# Patient Record
Sex: FEMALE | Race: WHITE | NOT HISPANIC OR LATINO | Employment: FULL TIME | URBAN - METROPOLITAN AREA
[De-identification: names, ages, dates, MRNs, and addresses within clinical notes are randomized per-mention and may not be internally consistent; named-entity substitution may affect disease eponyms.]

---

## 2012-01-05 LAB — EXTERNAL HIV SCREEN: NORMAL

## 2017-04-30 ENCOUNTER — OFFICE VISIT (OUTPATIENT)
Dept: URGENT CARE | Facility: CLINIC | Age: 47
End: 2017-04-30
Payer: COMMERCIAL

## 2017-04-30 PROCEDURE — 99201 HB OFFICE/OUTPATIENT VISIT NEW (BRIEF): CPT

## 2017-07-22 ENCOUNTER — OFFICE VISIT (OUTPATIENT)
Dept: URGENT CARE | Facility: CLINIC | Age: 47
End: 2017-07-22
Payer: COMMERCIAL

## 2017-07-22 PROCEDURE — 99213 OFFICE O/P EST LOW 20 MIN: CPT

## 2017-09-17 ENCOUNTER — OFFICE VISIT (OUTPATIENT)
Dept: URGENT CARE | Facility: CLINIC | Age: 47
End: 2017-09-17
Payer: COMMERCIAL

## 2017-09-17 PROCEDURE — 99213 OFFICE O/P EST LOW 20 MIN: CPT

## 2017-11-26 ENCOUNTER — OFFICE VISIT (OUTPATIENT)
Dept: URGENT CARE | Facility: CLINIC | Age: 47
End: 2017-11-26
Payer: COMMERCIAL

## 2017-11-26 PROCEDURE — 99213 OFFICE O/P EST LOW 20 MIN: CPT

## 2018-01-18 NOTE — PROGRESS NOTES
Assessment   1  History of subconjunctival hemorrhage (V12 49) (Z86 69)  2  Non-traumatic subconjunctival hemorrhage of left eye (372 72) (H11 32)    Plan  Non-traumatic subconjunctival hemorrhage of left eye    · Start: Tobramycin 0 3 % Ophthalmic Solution (Tobrex); INSTILL 1 DROP INTO AFFECTED  EYE(S) 4 TIMES DAILY    Discussion/Summary  Discussion Summary:   Instructions on the use of Tobrex were given to the patient  She will be seeing her family doctor this week for at least a CBC and further physical exam which she has not had an many years  Her blood pressure is normal today she is encouraged not to use any further additional Advil or aspirin at this time  Understands and agrees with treatment plan: The treatment plan was reviewed with the patient/guardian  The patient/guardian understands and agrees with the treatment plan   Counseling Documentation With Imm: The patient was counseled regarding instructions for management, prognosis  Follow Up Instructions: Follow Up with your Primary Care Provider in 2-3 days  If your symptoms worsen, go to the nearest [place] [address] Emergency Department  Chief Complaint   1  Eye Pain  Chief Complaint Free Text Note Form: Pt c/o left eye pain since Wednesday  History of Present Illness  HPI: Patient is a 74-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 who noted the p m  of 2 days ago a subconjunctival hemorrhage of her left eye  It may have happened during the evening before but she did not noted until the evening of the day  But today she has a foreign body sensation in that area which he did not have initially  The hemorrhage is migrating downward as usual  The patient takes Advil for headache occasionally takes aspirin but not both together she denies pregnancy at this time she is allergic to no foods or medications  There are no factor deficiencies or anemias in the family  She denies headache   Pain in the left eyes described as a foreign body sensation approximately 2/10   Hospital Based Practices Required Assessment:    Readiness To Learn: Receptive  Barriers To Learning: none  Education Completed: disease/condition   Teaching Method: verbal   Person Taught: patient   Evaluation Of Learning: verbalized/demonstrated understanding      Review of Systems  Focused-Female:   Constitutional: No fever, no chills, feels well, no tiredness, no recent weight gain or loss  ENT: no ear ache, no loss of hearing, no nosebleeds or nasal discharge, no sore throat or hoarseness  Respiratory: no complaints of shortness of breath, no wheezing, no dyspnea on exertion, no orthopnea or PND  Gastrointestinal: no complaints of abdominal pain, no constipation, no nausea or diarrhea, no vomiting, no bloody stools  Musculoskeletal: no complaints of arthralgia, no myalgia, no joint swelling or stiffness, no limb pain or swelling  Integumentary: no complaints of skin rash or lesion, no itching or dry skin, no skin wounds  Neurological: no complaints of headache, no confusion, no numbness or tingling, no dizziness or fainting  ROS Reviewed:   ROS reviewed  Active Problems   1  Acute sinusitis (461 9) (J01 90)  2  Annular tear of lumbar disc (722 52) (M51 36)  3  Dermatitis (692 9) (L30 9)  4  Lumbar degenerative disc disease (722 52) (M51 36)  5  Spinal stenosis of lumbar region (724 02) (M48 061)    Past Medical History   1  History of Acute exacerbation of chronic low back pain (724 2,338 19,338 29)   (M54 5,G89 29)  2  Denied: History of Carrier Of STD  3  History of Cervical strain, acute (847 0) (S16 1XXA)  4  History of Encounter for screening mammogram for malignant neoplasm of breast   (V76 12) (Z12 31)  5  History of Excessive sweating (780 8) (R61)  6  History of acne (V13 3) (Z87 2)  7  History of acute sinusitis (V12 69) (Z87 09)  8  History of amenorrhea (V13 29) (Z87 42)  9  History of headache (V13 89) (Z87 898)  10   History of low back pain (V13 59) (Z87 39)  11  History of sinusitis (V12 69) (Z87 09)  12  Denied: History of Mental Status Change  13  History of Neck pain (723 1) (M54 2)  14  History of Right shoulder pain (719 41) (M25 511)  15  History of Screening for breast cancer (V76 10) (Z12 31)  16  History of Screening for breast cancer (V76 10) (Z12 31)  17  History of Screening for depression (V79 0) (Z13 89)  18  History of Symptoms involving urinary system (788 99) (R39 9)  19  History of Visit for screening mammogram (P29 19) (Z12 31)  Active Problems And Past Medical History Reviewed: The active problems and past medical history were reviewed and updated today  Family History  Mother   1  Family history of Essential Hypertension  Family History Reviewed: The family history was reviewed and updated today  Social History    · Alcohol Use (History)   · Daily Coffee Consumption (3  Cups/Day)   · Denied: History of Drug Use   · Exercise Habits   · Marital History - Single   · Never a smoker   · No alcohol use   · Non-smoker (V49 89) (Z78 9)   · Occupation:  Social History Reviewed: The social history was reviewed and updated today  Surgical History   1  Denied: History Of Prior Surgery  Surgical History Reviewed: The surgical history was reviewed and updated today  Allergies   1  No Known Drug Allergies    Vitals  Signs   Recorded: 26Nov2017 09:51AM   Temperature: 98 5 F  Heart Rate: 62  Systolic: 481  Diastolic: 64  Height: 5 ft 4 in  Weight: 166 lb 3 62 oz  BMI Calculated: 28 53  BSA Calculated: 1 81  O2 Saturation: 98    Physical Exam    Eyes   Conjunctiva and lids: Abnormal   moderate sized subconjunctival hemorrhages emanates from the lateral aspect of the left eye  And is settling in the inferior aspect of the conjunctival space  EOMI ESDRAS a gross vision is normal    Pupils and irises: Equal, round and reactive to light      Ears, Nose, Mouth, and Throat   External inspection of ears and nose: Normal  Otoscopic examination: Tympanic membranes translucent with normal light reflex  Canals patent without erythema  Nasal mucosa, septum, and turbinates: Normal without edema or erythema  Oropharynx: Normal with no erythema, edema, exudate or lesions  Pulmonary   Auscultation of lungs: Clear to auscultation  Cardiovascular   Auscultation of heart: Normal rate and rhythm, normal S1 and S2, without murmurs  Lymphatic   Palpation of lymph nodes in neck: No lymphadenopathy      Psychiatric   Orientation to person, place, and time: Normal     Mood and affect: Normal        Signatures  Electronically signed by : Marceol John DO; Nov 26 2017 10:09AM EST                       (Author)

## 2018-08-06 ENCOUNTER — OFFICE VISIT (OUTPATIENT)
Dept: URGENT CARE | Facility: CLINIC | Age: 48
End: 2018-08-06
Payer: COMMERCIAL

## 2018-08-06 VITALS
OXYGEN SATURATION: 100 % | TEMPERATURE: 98.6 F | SYSTOLIC BLOOD PRESSURE: 140 MMHG | DIASTOLIC BLOOD PRESSURE: 87 MMHG | RESPIRATION RATE: 18 BRPM | HEART RATE: 78 BPM

## 2018-08-06 DIAGNOSIS — L02.511 ABSCESS OF RIGHT INDEX FINGER: Primary | ICD-10-CM

## 2018-08-06 PROCEDURE — 87186 SC STD MICRODIL/AGAR DIL: CPT | Performed by: NURSE PRACTITIONER

## 2018-08-06 PROCEDURE — 87147 CULTURE TYPE IMMUNOLOGIC: CPT | Performed by: NURSE PRACTITIONER

## 2018-08-06 PROCEDURE — 99213 OFFICE O/P EST LOW 20 MIN: CPT | Performed by: NURSE PRACTITIONER

## 2018-08-06 PROCEDURE — 87205 SMEAR GRAM STAIN: CPT | Performed by: NURSE PRACTITIONER

## 2018-08-06 PROCEDURE — 87070 CULTURE OTHR SPECIMN AEROBIC: CPT | Performed by: NURSE PRACTITIONER

## 2018-08-06 RX ORDER — SULFAMETHOXAZOLE AND TRIMETHOPRIM 800; 160 MG/1; MG/1
1 TABLET ORAL EVERY 12 HOURS SCHEDULED
Qty: 14 TABLET | Refills: 0 | Status: SHIPPED | OUTPATIENT
Start: 2018-08-06 | End: 2018-08-13

## 2018-08-06 NOTE — PROGRESS NOTES
[facility]        NAME: Neel Pena is a 51 y o  female  : 1970    MRN: [id number]  DATE: 2018  TIME: 6:51 PM    Assessment and Plan   Abscess of right index finger [L02 511]  1  Abscess of right index finger  sulfamethoxazole-trimethoprim (BACTRIM DS) 800-160 mg per tablet    Wound culture and Gram stain         Patient Instructions       Follow up with PCP in 3-5 days  Proceed to  ER if symptoms worsen  Chief Complaint     Chief Complaint   Patient presents with    Finger Pain     Pt c/o right pointer finger pain and redness since Saturday  History of Present Illness       59-year-old female with a chief complaint of pain and swelling of the distal right index finger around the fingernail  She denies injury  She states that last night she noticed clear drainage under the fingernail  Current pain level is a 5/10 on a 0-10 scale  Review of Systems   Review of Systems   Constitutional: Negative  HENT: Negative  Eyes: Negative  Respiratory: Negative  Cardiovascular: Negative  Gastrointestinal: Negative  Endocrine: Negative  Genitourinary: Negative  Musculoskeletal: Negative  Skin: Positive for color change (Redness and swelling distal right index finger  )  Neurological: Negative  Psychiatric/Behavioral: Negative  Current Medications       Current Outpatient Prescriptions:     sulfamethoxazole-trimethoprim (BACTRIM DS) 800-160 mg per tablet, Take 1 tablet by mouth every 12 (twelve) hours for 7 days, Disp: 14 tablet, Rfl: 0    Current Allergies     Allergies as of 2018    (No Known Allergies)            The following portions of the patient's history were reviewed and updated as appropriate: allergies, current medications, past family history, past medical history, past social history, past surgical history and problem list      No past medical history on file  No past surgical history on file      No family history on file  Medications have been verified  Objective   /87   Pulse 78   Temp 98 6 °F (37 °C)   Resp 18   SpO2 100%        Physical Exam     Physical Exam   Constitutional: She is oriented to person, place, and time  She appears well-developed and well-nourished  No distress  HENT:   Head: Normocephalic and atraumatic  Right Ear: External ear normal    Left Ear: External ear normal    Nose: Nose normal    Mouth/Throat: Oropharynx is clear and moist    Eyes: EOM are normal  Pupils are equal, round, and reactive to light  Neck: Normal range of motion  Neck supple  Cardiovascular: Normal rate, regular rhythm and normal heart sounds  Pulmonary/Chest: Effort normal and breath sounds normal    Abdominal: Soft  Bowel sounds are normal  She exhibits no distension  There is no tenderness  There is no rebound and no guarding  Neurological: She is alert and oriented to person, place, and time  She has normal reflexes  Skin: Skin is warm and dry  She is not diaphoretic  Area of erythema and swelling at the distal right index finger there is an area of fluctuance at the lateral skin fold  I opened it with a 27 gauge needle and drained a moderate amount of white to yellow drainage  Psychiatric: She has a normal mood and affect  Her behavior is normal  Judgment and thought content normal    Nursing note and vitals reviewed

## 2018-08-06 NOTE — PATIENT INSTRUCTIONS
Abscess   WHAT YOU NEED TO KNOW:   A warm compress may help your abscess drain  Your healthcare provider may make a cut in the abscess so it can drain  You may need surgery to remove an abscess that is on your hands or buttocks  DISCHARGE INSTRUCTIONS:   Return to the emergency department if:   · The area around your abscess becomes very painful, warm, or has red streaks  · You have a fever and chills  · Your heart is beating faster than usual      · You feel faint or confused  Contact your healthcare provider if:   · Your abscess gets bigger or does not get better  · Your abscess returns  · You have questions or concerns about your condition or care  Medicines: You may  need any of the following:  · Antibiotics  help treat a bacterial infection  · Acetaminophen  decreases pain and fever  It is available without a doctor's order  Ask how much to take and how often to take it  Follow directions  Acetaminophen can cause liver damage if not taken correctly  · NSAIDs , such as ibuprofen, help decrease swelling, pain, and fever  This medicine is available with or without a doctor's order  NSAIDs can cause stomach bleeding or kidney problems in certain people  If you take blood thinner medicine, always ask your healthcare provider if NSAIDs are safe for you  Always read the medicine label and follow directions  · Take your medicine as directed  Contact your healthcare provider if you think your medicine is not helping or if you have side effects  Tell him or her if you are allergic to any medicine  Keep a list of the medicines, vitamins, and herbs you take  Include the amounts, and when and why you take them  Bring the list or the pill bottles to follow-up visits  Carry your medicine list with you in case of an emergency  Self-care:   · Apply a warm compress to your abscess  This will help it open and drain  Wet a washcloth in warm, but not hot, water  Apply the compress for 10 minutes  Repeat this 4 times each day  Do not  press on an abscess or try to open it with a needle  You may push the bacteria deeper or into your blood  · Do not share your clothes, towels, or sheets with anyone  This can spread the infection to others  · Wash your hands often  This can help prevent the spread of germs  Use soap and water or an alcohol-based hand rub  Care for your wound after it is drained:   · Care for your wound as directed  If your healthcare provider says it is okay, carefully remove the bandage and gauze packing  You may need to soak the gauze to get it out of your wound  Clean your wound and the area around it as directed  Dry the area and put on new, clean bandages  Change your bandages when they get wet or dirty  · Ask your healthcare provider how to change the gauze in your wound  Keep track of how many pieces of gauze are placed inside the wound  Do not put too much packing in the wound  Do not pack the gauze too tightly in your wound  Follow up with your healthcare provider in 1 to 3 days: You may need to have your packing removed or your bandage changed  Write down your questions so you remember to ask them during your visits  © 2017 [employer]  Information is for End User's use only and may not be sold, redistributed or otherwise used for commercial purposes  All illustrations and images included in CareNotes® are the copyrighted property of [employer] , [employer]  or [employer]  The above information is an  only  It is not intended as medical advice for individual conditions or treatments  Talk to your doctor, nurse or pharmacist before following any medical regimen to see if it is safe and effective for you

## 2018-08-09 ENCOUNTER — TELEPHONE (OUTPATIENT)
Dept: URGENT CARE | Facility: CLINIC | Age: 48
End: 2018-08-09

## 2018-08-09 LAB
BACTERIA WND AEROBE CULT: ABNORMAL
BACTERIA WND AEROBE CULT: ABNORMAL
GRAM STN SPEC: ABNORMAL
GRAM STN SPEC: ABNORMAL

## 2018-08-17 ENCOUNTER — TELEPHONE (OUTPATIENT)
Dept: INTERNAL MEDICINE CLINIC | Age: 48
End: 2018-08-17

## 2018-08-17 NOTE — TELEPHONE ENCOUNTER
Patient is calling to let us know and if we can document it in her chart that she had an allergic reaction to the antibiotic Bactrum that she was given at the urgent care on 08/09/2018    Patient had the following symptoms, red flushed skin, hives all over her body from the medication  Can we brissa this in her chart that she can't have any sulfa drugs due to this reaction please  Patient took Benadryl for 2 days, and the hives subsided and the redness finally went away after the 48 hours went away later

## 2018-10-20 ENCOUNTER — OFFICE VISIT (OUTPATIENT)
Dept: URGENT CARE | Facility: CLINIC | Age: 48
End: 2018-10-20
Payer: COMMERCIAL

## 2018-10-20 VITALS
WEIGHT: 163 LBS | OXYGEN SATURATION: 100 % | DIASTOLIC BLOOD PRESSURE: 66 MMHG | TEMPERATURE: 98.5 F | HEIGHT: 64 IN | BODY MASS INDEX: 27.83 KG/M2 | HEART RATE: 71 BPM | RESPIRATION RATE: 16 BRPM | SYSTOLIC BLOOD PRESSURE: 113 MMHG

## 2018-10-20 DIAGNOSIS — B37.3 CANDIDIASIS OF VAGINA: ICD-10-CM

## 2018-10-20 DIAGNOSIS — L03.019 PARONYCHIA OF FINGER, UNSPECIFIED LATERALITY: Primary | ICD-10-CM

## 2018-10-20 PROCEDURE — 99213 OFFICE O/P EST LOW 20 MIN: CPT | Performed by: PHYSICIAN ASSISTANT

## 2018-10-20 RX ORDER — CEPHALEXIN 500 MG/1
500 CAPSULE ORAL EVERY 8 HOURS SCHEDULED
Qty: 21 CAPSULE | Refills: 0 | Status: SHIPPED | OUTPATIENT
Start: 2018-10-20 | End: 2018-10-27

## 2018-10-20 RX ORDER — FLUCONAZOLE 150 MG/1
TABLET ORAL
Qty: 2 TABLET | Refills: 0 | Status: SHIPPED | OUTPATIENT
Start: 2018-10-20 | End: 2018-10-27

## 2018-10-20 NOTE — PATIENT INSTRUCTIONS
Start antibiotic, take as directed  Soak fingers in warm water and Epsom salts  Take Diflucan 1 pill now and then 1 in 1 week  Follow up with GYN in the next week if not improving

## 2018-10-20 NOTE — PROGRESS NOTES
[facility]    NAME: Ainsley Ponce is a 51 y o  female  : 1970    MRN: [id number]  DATE: 2018  TIME: 2:20 PM    Assessment and Plan   Paronychia of finger, unspecified laterality [A68 827]  1  Paronychia of finger, unspecified laterality  cephalexin (KEFLEX) 500 mg capsule   2  Candidiasis of vagina  fluconazole (DIFLUCAN) 150 mg tablet       Patient Instructions     Patient Instructions   Start antibiotic, take as directed  Soak fingers in warm water and Epsom salts  Take Diflucan 1 pill now and then 1 in 1 week  Follow up with GYN in the next week if not improving  Chief Complaint     Chief Complaint   Patient presents with    Wound Infection     left and right  hand fingers       History of Present Illness   41-year-old female here with infection 2 fingers 1 on each hand  Has an infection along the outside of his nail  Painful and tender  She is also concerned that she may have a yeast infection  Has been having vaginal itching and some slight discharge  Review of Systems   Review of Systems   Constitutional: Negative for activity change, appetite change, chills, diaphoresis, fatigue, fever and unexpected weight change  HENT: Negative for congestion, dental problem, hearing loss, sinus pressure, sneezing, sore throat, tinnitus, trouble swallowing and voice change  Eyes: Negative for photophobia, redness and visual disturbance  Respiratory: Negative for apnea, cough, chest tightness, shortness of breath, wheezing and stridor  Cardiovascular: Negative for chest pain, palpitations and leg swelling  Gastrointestinal: Negative for abdominal distention, abdominal pain, blood in stool, constipation, diarrhea, nausea and vomiting  Endocrine: Negative for cold intolerance, heat intolerance, polydipsia, polyphagia and polyuria  Genitourinary: Positive for vaginal discharge (Itching)   Negative for difficulty urinating, dysuria, flank pain, frequency, hematuria and urgency  Musculoskeletal: Negative for arthralgias, back pain, gait problem, joint swelling, myalgias, neck pain and neck stiffness  Skin: Positive for wound  Negative for pallor and rash  Neurological: Negative for dizziness, tremors, seizures, speech difficulty, weakness and headaches  Hematological: Negative for adenopathy  Does not bruise/bleed easily  Psychiatric/Behavioral: Negative for agitation, confusion, dysphoric mood and sleep disturbance  The patient is not nervous/anxious  All other systems reviewed and are negative  Current Medications     Current Outpatient Prescriptions:     cephalexin (KEFLEX) 500 mg capsule, Take 1 capsule (500 mg total) by mouth every 8 (eight) hours for 7 days, Disp: 21 capsule, Rfl: 0    fluconazole (DIFLUCAN) 150 mg tablet, Take 1 pill today and then 1 pill in 1 week , Disp: 2 tablet, Rfl: 0    Current Allergies     Allergies as of 10/20/2018 - Reviewed 10/20/2018   Allergen Reaction Noted    Bactrim [sulfamethoxazole-trimethoprim] Hives 08/17/2018          The following portions of the patient's history were reviewed and updated as appropriate: allergies, current medications, past family history, past medical history, past social history, past surgical history and problem list    History reviewed  No pertinent past medical history  History reviewed  No pertinent surgical history  History reviewed  No pertinent family history  Social History     Social History    Marital status: /Civil Union     Spouse name: N/A    Number of children: N/A    Years of education: N/A     Occupational History    Not on file  Social History Main Topics    Smoking status: Not on file    Smokeless tobacco: Not on file    Alcohol use Not on file    Drug use: Unknown    Sexual activity: Not on file     Other Topics Concern    Not on file     Social History Narrative    No narrative on file     Medications have been verified      Objective   /66 Pulse 71   Temp 98 5 °F (36 9 °C)   Resp 16   Ht 5' 4" (1 626 m)   Wt 73 9 kg (163 lb)   SpO2 100%   BMI 27 98 kg/m²      Physical Exam   Physical Exam   Constitutional: She appears well-developed and well-nourished  No distress  HENT:   Head: Normocephalic  Right Ear: External ear normal    Left Ear: External ear normal    Nose: Nose normal    Mouth/Throat: Oropharynx is clear and moist  No oropharyngeal exudate  Neck: Normal range of motion  Neck supple  Cardiovascular: Normal rate, regular rhythm and normal heart sounds  No murmur heard  Pulmonary/Chest: Effort normal and breath sounds normal  No respiratory distress  She has no wheezes  She has no rales  Abdominal: Soft  Bowel sounds are normal  There is no tenderness  Musculoskeletal: Normal range of motion  Lymphadenopathy:     She has no cervical adenopathy  Skin: Skin is warm  No rash noted  There is erythema (Erythema of tissue surrounding nail on left middle finger right little finger  Tender to palpation with purulent discharge  )  Vitals reviewed

## 2019-07-29 LAB — NEGATIVE CONTROL: NEGATIVE

## 2019-08-13 ENCOUNTER — OFFICE VISIT (OUTPATIENT)
Dept: INTERNAL MEDICINE CLINIC | Facility: CLINIC | Age: 49
End: 2019-08-13
Payer: COMMERCIAL

## 2019-08-13 VITALS
SYSTOLIC BLOOD PRESSURE: 114 MMHG | DIASTOLIC BLOOD PRESSURE: 80 MMHG | BODY MASS INDEX: 29.91 KG/M2 | HEIGHT: 64 IN | TEMPERATURE: 98.3 F | OXYGEN SATURATION: 98 % | HEART RATE: 82 BPM | WEIGHT: 175.2 LBS

## 2019-08-13 DIAGNOSIS — Z00.00 ENCOUNTER FOR ANNUAL PHYSICAL EXAM: ICD-10-CM

## 2019-08-13 DIAGNOSIS — Z12.11 SCREENING FOR COLON CANCER: Primary | ICD-10-CM

## 2019-08-13 DIAGNOSIS — E66.9 OBESITY (BMI 30.0-34.9): ICD-10-CM

## 2019-08-13 DIAGNOSIS — Z11.59 NEED FOR HEPATITIS C SCREENING TEST: ICD-10-CM

## 2019-08-13 DIAGNOSIS — Z00.00 ANNUAL PHYSICAL EXAM: ICD-10-CM

## 2019-08-13 DIAGNOSIS — Z12.31 ENCOUNTER FOR SCREENING MAMMOGRAM FOR MALIGNANT NEOPLASM OF BREAST: ICD-10-CM

## 2019-08-13 PROCEDURE — 99386 PREV VISIT NEW AGE 40-64: CPT | Performed by: INTERNAL MEDICINE

## 2019-08-13 NOTE — PATIENT INSTRUCTIONS

## 2019-08-13 NOTE — PROGRESS NOTES
ADULT ANNUAL PHYSICAL  [facility] - [facility] PRIMARY CARE [place]    NAME: Elvia Isaac  AGE: 47 y o  SEX: female  : 1970     DATE: 2019     Assessment and Plan:     Problem List Items Addressed This Visit        Other    Screening for colon cancer - Primary    Relevant Orders    Ambulatory referral to Gastroenterology    Encounter for screening mammogram for malignant neoplasm of breast    Relevant Orders    Mammo screening bilateral w cad    Wellness examination        Immunizations and preventive care screenings were discussed with patient today  Appropriate education was printed on patient's after visit summary  Counseling:  · BMI Counseling: Body mass index is 30 55 kg/m²  Discussed the patient's BMI with her  The BMI is above average  BMI counseling and education was provided to the patient  Nutrition recommendations include reducing portion sizes, decreasing overall calorie intake, 3-5 servings of fruits/vegetables daily, reducing fast food intake, consuming healthier snacks, decreasing soda and/or juice intake and moderation in carbohydrate intake  Exercise recommendations include exercising 3-5 times per week  No follow-ups on file  Chief Complaint:     Chief Complaint   Patient presents with    Physical Exam     Pt is here today for her yearly physical        History of Present Illness:     Adult Annual Physical   Patient here for a comprehensive physical exam  The patient reports problems - Mostly the GI problems with bloating gas and abdominal cramping she has an appointment with the gastroenterologist for further evaluation  Diet and Physical Activity  · Diet/Nutrition: well balanced diet  · Exercise: moderate cardiovascular exercise        Depression Screening  PHQ-9 Depression Screening    PHQ-9:    Frequency of the following problems over the past two weeks:       Little interest or pleasure in doing things:  0 - not at all  Feeling down, depressed, or hopeless:  0 - not at all  PHQ-2 Score:  0       General Health  · Sleep: gets 4-6 hours of sleep on average  · Hearing: normal - bilateral   · Vision: goes for regular eye exams  · Dental: regular dental visits  /GYN Health  · Patient is: premenopausal  · Last menstrual period: This month  · Contraceptive method: barrier methods  Review of Systems:     Review of Systems   Constitutional: Negative for chills and fatigue  HENT: Negative for congestion, ear pain, hearing loss, postnasal drip, sinus pressure, sore throat and voice change  Eyes: Negative for pain, discharge and visual disturbance  Respiratory: Negative for cough, chest tightness and shortness of breath  Cardiovascular: Negative for chest pain, palpitations and leg swelling  Gastrointestinal: Positive for abdominal distention  Negative for abdominal pain, blood in stool, diarrhea, nausea and rectal pain  Genitourinary: Negative for difficulty urinating, dysuria and urgency  Musculoskeletal: Positive for back pain  Negative for arthralgias and joint swelling  Skin: Negative for rash  Allergic/Immunologic: Negative for environmental allergies and food allergies  Neurological: Negative for dizziness, tremors, weakness, numbness and headaches  Hematological: Negative for adenopathy  Psychiatric/Behavioral: Negative for behavioral problems and hallucinations        Past Medical History:     Past Medical History:   Diagnosis Date    Chronic back pain       Past Surgical History:     Past Surgical History:   Procedure Laterality Date    NO PAST SURGERIES        Social History:     Social History     Socioeconomic History    Marital status: /Civil Union     Spouse name: None    Number of children: None    Years of education: None    Highest education level: None   Occupational History    None   Social Needs    Financial resource strain: None    Food insecurity:     Worry: None Inability: None    Transportation needs:     Medical: None     Non-medical: None   Tobacco Use    Smoking status: Never Smoker    Smokeless tobacco: Never Used   Substance and Sexual Activity    Alcohol use: Yes     Frequency: Monthly or less     Drinks per session: 1 or 2     Binge frequency: Never     Comment: socially    Drug use: Never     Comment: Denied drug use    Sexual activity: None   Lifestyle    Physical activity:     Days per week: None     Minutes per session: None    Stress: None   Relationships    Social connections:     Talks on phone: None     Gets together: None     Attends Mu-ism service: None     Active member of club or organization: None     Attends meetings of clubs or organizations: None     Relationship status: None    Intimate partner violence:     Fear of current or ex partner: None     Emotionally abused: None     Physically abused: None     Forced sexual activity: None   Other Topics Concern    None   Social History Narrative    None      Family History:     Family History   Problem Relation Age of Onset    Hypertension Mother       Current Medications:     No current outpatient medications on file  No current facility-administered medications for this visit  Allergies: Allergies   Allergen Reactions    Bactrim [Sulfamethoxazole-Trimethoprim] Hives      Physical Exam:     /80 (BP Location: Left arm, Patient Position: Sitting, Cuff Size: Adult)   Pulse 82   Temp 98 3 °F (36 8 °C) (Oral)   Ht 5' 3 5" (1 613 m)   Wt 79 5 kg (175 lb 3 2 oz)   SpO2 98% Comment: room air  BMI 30 55 kg/m²     Physical Exam   Constitutional: She is oriented to person, place, and time  She appears well-developed and well-nourished  HENT:   Right Ear: External ear normal    Mouth/Throat: Oropharynx is clear and moist    Eyes: Pupils are equal, round, and reactive to light  Conjunctivae and EOM are normal    Neck: Normal range of motion  No JVD present   No thyromegaly present  Cardiovascular: Normal rate, regular rhythm, normal heart sounds and intact distal pulses  Pulmonary/Chest: Breath sounds normal    Abdominal: Soft  Bowel sounds are normal    Musculoskeletal: Normal range of motion  Lymphadenopathy:     She has no cervical adenopathy  Neurological: She is alert and oriented to person, place, and time  She has normal reflexes  Skin: Skin is dry  Psychiatric: She has a normal mood and affect   Her behavior is normal  Judgment and thought content normal        Claudean Perry, Allysona 45

## 2019-09-18 ENCOUNTER — APPOINTMENT (OUTPATIENT)
Dept: LAB | Facility: CLINIC | Age: 49
End: 2019-09-18
Payer: COMMERCIAL

## 2019-09-18 DIAGNOSIS — Z00.00 ANNUAL PHYSICAL EXAM: ICD-10-CM

## 2019-09-18 DIAGNOSIS — Z11.59 NEED FOR HEPATITIS C SCREENING TEST: ICD-10-CM

## 2019-09-18 LAB
ALBUMIN SERPL BCP-MCNC: 4.2 G/DL (ref 3.5–5)
ALP SERPL-CCNC: 68 U/L (ref 46–116)
ALT SERPL W P-5'-P-CCNC: 32 U/L (ref 12–78)
ANION GAP SERPL CALCULATED.3IONS-SCNC: 4 MMOL/L (ref 4–13)
AST SERPL W P-5'-P-CCNC: 20 U/L (ref 5–45)
BILIRUB SERPL-MCNC: 0.73 MG/DL (ref 0.2–1)
BUN SERPL-MCNC: 16 MG/DL (ref 5–25)
CALCIUM SERPL-MCNC: 9.1 MG/DL (ref 8.3–10.1)
CHLORIDE SERPL-SCNC: 107 MMOL/L (ref 100–108)
CHOLEST SERPL-MCNC: 227 MG/DL (ref 50–200)
CO2 SERPL-SCNC: 27 MMOL/L (ref 21–32)
CREAT SERPL-MCNC: 0.68 MG/DL (ref 0.6–1.3)
ERYTHROCYTE [DISTWIDTH] IN BLOOD BY AUTOMATED COUNT: 13.4 % (ref 11.6–15.1)
EST. AVERAGE GLUCOSE BLD GHB EST-MCNC: 97 MG/DL
GFR SERPL CREATININE-BSD FRML MDRD: 102 ML/MIN/1.73SQ M
GLUCOSE P FAST SERPL-MCNC: 98 MG/DL (ref 65–99)
HBA1C MFR BLD: 5 % (ref 4.2–6.3)
HCT VFR BLD AUTO: 43.9 % (ref 34.8–46.1)
HCV AB SER QL: NORMAL
HDLC SERPL-MCNC: 84 MG/DL (ref 40–60)
HGB BLD-MCNC: 14.2 G/DL (ref 11.5–15.4)
LDLC SERPL CALC-MCNC: 133 MG/DL (ref 0–100)
MCH RBC QN AUTO: 29.6 PG (ref 26.8–34.3)
MCHC RBC AUTO-ENTMCNC: 32.3 G/DL (ref 31.4–37.4)
MCV RBC AUTO: 92 FL (ref 82–98)
NONHDLC SERPL-MCNC: 143 MG/DL
PLATELET # BLD AUTO: 238 THOUSANDS/UL (ref 149–390)
PMV BLD AUTO: 9.5 FL (ref 8.9–12.7)
POTASSIUM SERPL-SCNC: 4.1 MMOL/L (ref 3.5–5.3)
PROT SERPL-MCNC: 7.5 G/DL (ref 6.4–8.2)
RBC # BLD AUTO: 4.8 MILLION/UL (ref 3.81–5.12)
SODIUM SERPL-SCNC: 138 MMOL/L (ref 136–145)
TRIGL SERPL-MCNC: 49 MG/DL
TSH SERPL DL<=0.05 MIU/L-ACNC: 1.72 UIU/ML (ref 0.36–3.74)
WBC # BLD AUTO: 4.92 THOUSAND/UL (ref 4.31–10.16)

## 2019-09-18 PROCEDURE — 84443 ASSAY THYROID STIM HORMONE: CPT

## 2019-09-18 PROCEDURE — 80053 COMPREHEN METABOLIC PANEL: CPT

## 2019-09-18 PROCEDURE — 86803 HEPATITIS C AB TEST: CPT

## 2019-09-18 PROCEDURE — 80061 LIPID PANEL: CPT

## 2019-09-18 PROCEDURE — 36415 COLL VENOUS BLD VENIPUNCTURE: CPT

## 2019-09-18 PROCEDURE — 83036 HEMOGLOBIN GLYCOSYLATED A1C: CPT

## 2019-09-18 PROCEDURE — 85027 COMPLETE CBC AUTOMATED: CPT

## 2019-10-22 ENCOUNTER — OFFICE VISIT (OUTPATIENT)
Dept: GASTROENTEROLOGY | Facility: CLINIC | Age: 49
End: 2019-10-22
Payer: COMMERCIAL

## 2019-10-22 VITALS
HEART RATE: 78 BPM | WEIGHT: 163.2 LBS | BODY MASS INDEX: 27.86 KG/M2 | SYSTOLIC BLOOD PRESSURE: 113 MMHG | DIASTOLIC BLOOD PRESSURE: 73 MMHG | TEMPERATURE: 98.3 F | HEIGHT: 64 IN

## 2019-10-22 DIAGNOSIS — Z12.11 SCREENING FOR COLON CANCER: Primary | ICD-10-CM

## 2019-10-22 DIAGNOSIS — K64.9 HEMORRHOIDS, UNSPECIFIED HEMORRHOID TYPE: ICD-10-CM

## 2019-10-22 DIAGNOSIS — R14.0 BLOATING: ICD-10-CM

## 2019-10-22 PROCEDURE — 99244 OFF/OP CNSLTJ NEW/EST MOD 40: CPT | Performed by: INTERNAL MEDICINE

## 2019-10-22 NOTE — PATIENT INSTRUCTIONS
EGD/colonoscopy scheduled 12/9/19 with Dr Delmer Fierro at [facility]  Suprep instructions given to pt during OV

## 2019-10-22 NOTE — PROGRESS NOTES
[facility] - Outpatient Consultation  Annika Varner 47 y o  female MRN: [id number]  Encounter: [id number]          ASSESSMENT AND PLAN:  Ms Riley Gutiérrez was seen today for colon cancer screening consult and bloating  Diagnoses and all orders for this visit:    Screening for colon cancer: She denies weight loss or hematochezia, but notes she has had a hemorrhoid since the birth of her son  I will schedule her for colonoscopy as she has not had prior colonoscopy and is now due  Risks and benefits of the procedure were discussed  Risks include but are not limited to bleeding, perforation, missed lesion  She is agreeable to the procedure  Bowel prep instructions given  Bloating: She complains of longstanding abdominal pain, bloating and frequent gas  She has noticed some minor improvement since decreasing sugar/carb consumption  She continues to have increased gas when she is hungry  She has postprandial urgency  She denies abdominal pain that is relieved by a bowel movement  I will perform an EGD and take biopsies to assess for H  Pylori, celiac disease or ulcer  I will also order a celiac disease antibody profile  Irritable Bowel Syndrome is a differential diagnosis  I provided her with literature regarding the low FODMAP diet  Hemorrhoids, unspecified hemorrhoid type: She reports a hx of a hemorrhoid since the birth of her son  I will assess for hemorrhoids during colonoscopy, as above  Follow-up in 4 months    ______________________________________________________________________    HPI:  Annika Varner is a 47 y o  female referred by Dr David Mancia a colon cancer screening consult  She complains of longstanding abdominal pain, bloating and frequent gas  She has noticed some improvement since decreasing sugar/carb consumption, but her symptoms continue  She has increased gas when she is hungry  She has postprandial urgency   She denies abdominal pain that is relieved by a bowel movement  Her mother is experiencing fecal incontinence amd this concerns Ms De Paz  She denies unintentional weight loss  She denies hematochezia but states she has had a hemorrhoid that bleeds on occasion since the birth of her son  She denies FHx of colon cancer  Her aunt has a hx of colon polyps  REVIEW OF SYSTEMS:    CONSTITUTIONAL: Denies any fever, chills, rigors, and weight loss  HEENT: No earache or tinnitus  Denies hearing loss or visual disturbances  CARDIOVASCULAR: No chest pain or palpitations  RESPIRATORY: Denies any cough, hemoptysis, shortness of breath or dyspnea on exertion  GASTROINTESTINAL: As noted in the History of Present Illness  GENITOURINARY: No problems with urination  Denies any hematuria or dysuria  NEUROLOGIC: No dizziness or vertigo, denies headaches  MUSCULOSKELETAL: Denies any muscle or joint pain  SKIN: Denies skin rashes or itching  ENDOCRINE: Denies excessive thirst  Denies intolerance to heat or cold  PSYCHOSOCIAL: Denies depression or anxiety  Denies any recent memory loss         Historical Information   Past Medical History:   Diagnosis Date    Chronic back pain      Past Surgical History:   Procedure Laterality Date    NO PAST SURGERIES       Social History   Social History     Substance and Sexual Activity   Alcohol Use Yes    Frequency: Monthly or less    Drinks per session: 1 or 2    Binge frequency: Never    Comment: socially     Social History     Substance and Sexual Activity   Drug Use Never    Comment: Denied drug use     Social History     Tobacco Use   Smoking Status Never Smoker   Smokeless Tobacco Never Used     Family History   Problem Relation Age of Onset    Hypertension Mother        Meds/Allergies       Current Outpatient Medications:     Na Sulfate-K Sulfate-Mg Sulf (SUPREP BOWEL PREP KIT) 17 5-3 13-1 6 GM/177ML SOLN    Allergies   Allergen Reactions    Bactrim [Sulfamethoxazole-Trimethoprim] Hives           Objective Blood pressure 113/73, pulse 78, temperature 98 3 °F (36 8 °C), temperature source Tympanic, height 5' 3 5" (1 613 m), weight 74 kg (163 lb 3 2 oz)  Body mass index is 28 46 kg/m²  PHYSICAL EXAM:      General Appearance:   Alert, cooperative, no distress   HEENT:   Normocephalic, atraumatic, anicteric      Neck:  Supple, symmetrical, trachea midline   Lungs:   Clear to auscultation bilaterally; no rales, rhonchi or wheezing; respirations unlabored    Heart[de-identified]   Regular rate and rhythm; no murmur, rub, or gallop  Abdomen:   Soft, non-tender, non-distended; normal bowel sounds; no masses, no organomegaly    Genitalia:   Deferred    Rectal:   Deferred    Extremities:  No cyanosis, clubbing or edema    Pulses:  2+ and symmetric    Skin:  No jaundice, rashes, or lesions    Lymph nodes:  No palpable cervical lymphadenopathy        Lab Results:   No visits with results within 1 Day(s) from this visit     Latest known visit with results is:   Appointment on 09/18/2019   Component Date Value    Cholesterol 09/18/2019 227*    Triglycerides 09/18/2019 49     HDL, Direct 09/18/2019 84*    LDL Calculated 09/18/2019 133*    Non-HDL-Chol (CHOL-HDL) 09/18/2019 143     Hepatitis C Ab 09/18/2019 Non-reactive     Hemoglobin A1C 09/18/2019 5 0     EAG 09/18/2019 97     WBC 09/18/2019 4 92     RBC 09/18/2019 4 80     Hemoglobin 09/18/2019 14 2     Hematocrit 09/18/2019 43 9     MCV 09/18/2019 92     MCH 09/18/2019 29 6     MCHC 09/18/2019 32 3     RDW 09/18/2019 13 4     Platelets 34/11/3575 238     MPV 09/18/2019 9 5     Sodium 09/18/2019 138     Potassium 09/18/2019 4 1     Chloride 09/18/2019 107     CO2 09/18/2019 27     ANION GAP 09/18/2019 4     BUN 09/18/2019 16     Creatinine 09/18/2019 0 68     Glucose, Fasting 09/18/2019 98     Calcium 09/18/2019 9 1     AST 09/18/2019 20     ALT 09/18/2019 32     Alkaline Phosphatase 09/18/2019 68     Total Protein 09/18/2019 7 5     Albumin 09/18/2019 4 2     Total Bilirubin 09/18/2019 0 73     eGFR 09/18/2019 102     TSH 3RD GENERATON 09/18/2019 1 720          Radiology Results:   No results found  Attestation:   By signing my name below, Lu Law, attest that this documentation has been prepared under the direction and in the presence of ERIN Desai  Electronically Signed: Seth Bravo  10/22/19       I, Regina Hutchinson, personally performed the services described in this documentation  All medical record entries made by the scribe were at my direction and in my presence  I have reviewed the chart and discharge instructions and agree that the record reflects my personal performance and is accurate and complete   ERIN Desai  10/22/19

## 2019-11-24 ENCOUNTER — ANESTHESIA EVENT (OUTPATIENT)
Dept: GASTROENTEROLOGY | Facility: AMBULARY SURGERY CENTER | Age: 49
End: 2019-11-24

## 2019-11-27 NOTE — PROGRESS NOTES
Chart updated per office request  Discrete reportable data documented  Updated Pap DOS 4-8-14 and HIV Screening DOS 1-5-12

## 2019-12-02 ENCOUNTER — TRANSCRIBE ORDERS (OUTPATIENT)
Dept: GASTROENTEROLOGY | Facility: CLINIC | Age: 49
End: 2019-12-02

## 2019-12-05 ENCOUNTER — TELEPHONE (OUTPATIENT)
Dept: GASTROENTEROLOGY | Facility: AMBULARY SURGERY CENTER | Age: 49
End: 2019-12-05

## 2019-12-05 NOTE — TELEPHONE ENCOUNTER
Patient called to inquire if she needed to complete celiac blood work prior to 12/9/19 colonoscopy  She is aware blood test can be done before or after colonoscopy  Biopsy will most likely be done for celiac

## 2019-12-05 NOTE — TELEPHONE ENCOUNTER
Patients GI provider:  Dr Ryan Godfrey     Number to return call: ( [phone]    Reason for call: Pt calling to see if she needs to have the celiac  labs done before her colon on 12-9-19    Scheduled procedure/appointment date if applicable: Apt/procedure 12-9-19 colon

## 2019-12-08 NOTE — ANESTHESIA PREPROCEDURE EVALUATION
Review of Systems/Medical History          Cardiovascular  Exercise tolerance (METS): >4,     Pulmonary  Not a smoker ,        GI/Hepatic    No GERD ,   Comment: 2-4 drinks/month          Endo/Other     GYN       Hematology   Musculoskeletal  Back pain , lumbar pain,        Neurology   Psychology           Physical Exam    Airway    Mallampati score: I  TM Distance: >3 FB  Neck ROM: full     Dental   No notable dental hx     Cardiovascular      Pulmonary      Other Findings        Anesthesia Plan  ASA Score- 2     Anesthesia Type- IV sedation with anesthesia with ASA Monitors  Additional Monitors:   Airway Plan:         Plan Factors-Patient not instructed to abstain from smoking on day of procedure       Induction- intravenous  Postoperative Plan-     Informed Consent- Anesthetic plan and risks discussed with patient  I personally reviewed this patient with the CRNA  Discussed and agreed on the Anesthesia Plan with the CRNA  Reginald Reyes

## 2019-12-09 ENCOUNTER — HOSPITAL ENCOUNTER (OUTPATIENT)
Dept: GASTROENTEROLOGY | Facility: AMBULARY SURGERY CENTER | Age: 49
Setting detail: OUTPATIENT SURGERY
Discharge: HOME | End: 2019-12-09
Attending: INTERNAL MEDICINE | Admitting: INTERNAL MEDICINE
Payer: COMMERCIAL

## 2019-12-09 ENCOUNTER — ANESTHESIA (OUTPATIENT)
Dept: GASTROENTEROLOGY | Facility: AMBULARY SURGERY CENTER | Age: 49
End: 2019-12-09

## 2019-12-09 VITALS
SYSTOLIC BLOOD PRESSURE: 103 MMHG | BODY MASS INDEX: 27.31 KG/M2 | RESPIRATION RATE: 18 BRPM | WEIGHT: 160 LBS | DIASTOLIC BLOOD PRESSURE: 69 MMHG | OXYGEN SATURATION: 100 % | HEART RATE: 67 BPM | TEMPERATURE: 97.3 F | HEIGHT: 64 IN

## 2019-12-09 DIAGNOSIS — R14.0 BLOATING: ICD-10-CM

## 2019-12-09 DIAGNOSIS — Z12.11 SCREENING FOR COLON CANCER: ICD-10-CM

## 2019-12-09 LAB
EXT PREGNANCY TEST URINE: NEGATIVE
EXT. CONTROL: NORMAL

## 2019-12-09 PROCEDURE — NC001 PR NO CHARGE: Performed by: INTERNAL MEDICINE

## 2019-12-09 PROCEDURE — 88305 TISSUE EXAM BY PATHOLOGIST: CPT | Performed by: PATHOLOGY

## 2019-12-09 PROCEDURE — G0121 COLON CA SCRN NOT HI RSK IND: HCPCS | Performed by: INTERNAL MEDICINE

## 2019-12-09 PROCEDURE — 43239 EGD BIOPSY SINGLE/MULTIPLE: CPT | Performed by: INTERNAL MEDICINE

## 2019-12-09 PROCEDURE — 81025 URINE PREGNANCY TEST: CPT | Performed by: ANESTHESIOLOGY

## 2019-12-09 RX ORDER — LIDOCAINE HYDROCHLORIDE 10 MG/ML
INJECTION, SOLUTION INFILTRATION; PERINEURAL AS NEEDED
Status: DISCONTINUED | OUTPATIENT
Start: 2019-12-09 | End: 2019-12-09 | Stop reason: SURG

## 2019-12-09 RX ORDER — PROPOFOL 10 MG/ML
INJECTION, EMULSION INTRAVENOUS AS NEEDED
Status: DISCONTINUED | OUTPATIENT
Start: 2019-12-09 | End: 2019-12-09 | Stop reason: SURG

## 2019-12-09 RX ORDER — SODIUM CHLORIDE, SODIUM LACTATE, POTASSIUM CHLORIDE, CALCIUM CHLORIDE 600; 310; 30; 20 MG/100ML; MG/100ML; MG/100ML; MG/100ML
125 INJECTION, SOLUTION INTRAVENOUS CONTINUOUS
Status: DISCONTINUED | OUTPATIENT
Start: 2019-12-09 | End: 2019-12-13 | Stop reason: HOSPADM

## 2019-12-09 RX ADMIN — PROPOFOL 50 MG: 10 INJECTION, EMULSION INTRAVENOUS at 10:31

## 2019-12-09 RX ADMIN — PROPOFOL 50 MG: 10 INJECTION, EMULSION INTRAVENOUS at 10:15

## 2019-12-09 RX ADMIN — PROPOFOL 50 MG: 10 INJECTION, EMULSION INTRAVENOUS at 10:23

## 2019-12-09 RX ADMIN — SODIUM CHLORIDE, SODIUM LACTATE, POTASSIUM CHLORIDE, AND CALCIUM CHLORIDE 125 ML/HR: .6; .31; .03; .02 INJECTION, SOLUTION INTRAVENOUS at 10:02

## 2019-12-09 RX ADMIN — PROPOFOL 50 MG: 10 INJECTION, EMULSION INTRAVENOUS at 10:19

## 2019-12-09 RX ADMIN — LIDOCAINE HYDROCHLORIDE 50 MG: 10 INJECTION, SOLUTION INFILTRATION; PERINEURAL at 10:11

## 2019-12-09 RX ADMIN — PROPOFOL 50 MG: 10 INJECTION, EMULSION INTRAVENOUS at 10:27

## 2019-12-09 RX ADMIN — PROPOFOL 100 MG: 10 INJECTION, EMULSION INTRAVENOUS at 10:11

## 2019-12-09 NOTE — H&P
History and Physical -  [facility]  Serena Valladares 47 y o  female MRN: [id number]        HPI: Serena Valladares is a 47y o  year old female who presents for colon cancer screening as well as EGD for abdominal bloating  REVIEW OF SYSTEMS: Per the HPI, and otherwise unremarkable  Historical Information   Past Medical History:   Diagnosis Date    Chronic back pain      Past Surgical History:   Procedure Laterality Date    NO PAST SURGERIES       Social History   Social History     Substance and Sexual Activity   Alcohol Use Yes    Frequency: Monthly or less    Drinks per session: 1 or 2    Binge frequency: Never    Comment: socially     Social History     Substance and Sexual Activity   Drug Use Never    Comment: Denied drug use     Social History     Tobacco Use   Smoking Status Never Smoker   Smokeless Tobacco Never Used     Family History   Problem Relation Age of Onset    Hypertension Mother        Meds/Allergies       (Not in a hospital admission)    Allergies   Allergen Reactions    Bactrim [Sulfamethoxazole-Trimethoprim] Hives       Objective     There were no vitals taken for this visit  PHYSICAL EXAM    Gen: NAD  CV: RRR  CHEST: Clear  ABD: soft, NT/ND  EXT: no edema      ASSESSMENT/PLAN:  This is a 47y o  year old female here for colon cancer screening as well as EGD for abdominal bloating, and she is stable and optimized for her procedure

## 2019-12-17 ENCOUNTER — TELEPHONE (OUTPATIENT)
Dept: GASTROENTEROLOGY | Facility: AMBULARY SURGERY CENTER | Age: 49
End: 2019-12-17

## 2019-12-17 NOTE — TELEPHONE ENCOUNTER
----- Message from [place], [place] sent at 12/17/2019 10:13 AM EST -----  I spoke with patient, we went over results,patient  Understood  Patient would like to know what would be causing the inflammation ? Is is diet? Patient would also like to report she is still having abdominal pain, gas and bloating    Please advise Thank you

## 2019-12-23 NOTE — TELEPHONE ENCOUNTER
Dr Meera Beatty patient Hx- abdominal pain, diarrhea, bloating    Patient call to follow up concerns discussed during result call  Patient reports persistent gas and bloating and occasional urgency with BM after eating  Recommended probiotic and gas x for bloating  Review FODMAP diet information as discussed at 10/22/19 office visit    Call back if symptoms persist

## 2020-06-09 ENCOUNTER — OFFICE VISIT (OUTPATIENT)
Dept: INTERNAL MEDICINE CLINIC | Age: 50
End: 2020-06-09
Payer: COMMERCIAL

## 2020-06-09 VITALS
WEIGHT: 178 LBS | BODY MASS INDEX: 30.39 KG/M2 | TEMPERATURE: 97.8 F | OXYGEN SATURATION: 98 % | DIASTOLIC BLOOD PRESSURE: 66 MMHG | HEIGHT: 64 IN | SYSTOLIC BLOOD PRESSURE: 110 MMHG | HEART RATE: 79 BPM

## 2020-06-09 DIAGNOSIS — F41.8 ANXIETY WITH DEPRESSION: Primary | ICD-10-CM

## 2020-06-09 PROCEDURE — 3008F BODY MASS INDEX DOCD: CPT | Performed by: INTERNAL MEDICINE

## 2020-06-09 PROCEDURE — 1036F TOBACCO NON-USER: CPT | Performed by: INTERNAL MEDICINE

## 2020-06-09 PROCEDURE — 99214 OFFICE O/P EST MOD 30 MIN: CPT | Performed by: INTERNAL MEDICINE

## 2020-06-09 RX ORDER — ESCITALOPRAM OXALATE 10 MG/1
10 TABLET ORAL DAILY
Qty: 30 TABLET | Refills: 1 | Status: SHIPPED | OUTPATIENT
Start: 2020-06-09 | End: 2020-07-15 | Stop reason: SDUPTHER

## 2020-07-15 ENCOUNTER — OFFICE VISIT (OUTPATIENT)
Dept: INTERNAL MEDICINE CLINIC | Facility: CLINIC | Age: 50
End: 2020-07-15
Payer: COMMERCIAL

## 2020-07-15 VITALS
BODY MASS INDEX: 30.22 KG/M2 | HEART RATE: 66 BPM | OXYGEN SATURATION: 98 % | TEMPERATURE: 97.1 F | DIASTOLIC BLOOD PRESSURE: 74 MMHG | WEIGHT: 177 LBS | SYSTOLIC BLOOD PRESSURE: 120 MMHG | HEIGHT: 64 IN

## 2020-07-15 DIAGNOSIS — F41.8 ANXIETY WITH DEPRESSION: ICD-10-CM

## 2020-07-15 PROCEDURE — 3008F BODY MASS INDEX DOCD: CPT | Performed by: INTERNAL MEDICINE

## 2020-07-15 PROCEDURE — 99214 OFFICE O/P EST MOD 30 MIN: CPT | Performed by: INTERNAL MEDICINE

## 2020-07-15 PROCEDURE — 1036F TOBACCO NON-USER: CPT | Performed by: INTERNAL MEDICINE

## 2020-07-15 RX ORDER — ESCITALOPRAM OXALATE 10 MG/1
10 TABLET ORAL DAILY
Qty: 90 TABLET | Refills: 1 | Status: SHIPPED | OUTPATIENT
Start: 2020-07-15 | End: 2020-11-03 | Stop reason: SDUPTHER

## 2020-07-15 NOTE — PROGRESS NOTES
Assessment/Plan:     Diagnoses and all orders for this visit:    Anxiety with depression  -     escitalopram (LEXAPRO) 10 mg tablet; Take 1 tablet (10 mg total) by mouth daily             Subjective:   Chief Complaint   Patient presents with    Follow-up     3 month follow up going for mammo on Monday   refill on medication         Patient ID: Oswaldo Carlos is a 47 y o  female  HPI  This is a very pleasant 47 years young lady with history of depression and anxiety it was mostly secondary to what she was going through at home her son was having problem with the depression and anxiety and attention deficit problems and was under the care of the psychiatrist she is doing better son is doing better her anxiety her frustration and temper is better than before family is also feeling that she is doing better no side effects of the medication no fever or chills only side effect is only sexual dysfunction and decreased libido  The following portions of the patient's history were reviewed and updated as appropriate: allergies, current medications, past family history, past medical history, past social history, past surgical history and problem list     Review of Systems   Constitutional: Negative for chills and fatigue  HENT: Negative for congestion, ear pain, hearing loss, postnasal drip, sinus pressure, sore throat and voice change  Eyes: Negative for pain, discharge and visual disturbance  Respiratory: Negative for cough, chest tightness and shortness of breath  Cardiovascular: Negative for chest pain, palpitations and leg swelling  Gastrointestinal: Negative for abdominal pain, blood in stool, diarrhea, nausea and rectal pain  Genitourinary: Negative for difficulty urinating, dysuria and urgency  Musculoskeletal: Positive for back pain  Negative for arthralgias and joint swelling  Skin: Negative for rash  Allergic/Immunologic: Negative for environmental allergies and food allergies  Neurological: Negative for dizziness, tremors, weakness, numbness and headaches  Hematological: Negative for adenopathy  Psychiatric/Behavioral: Negative for behavioral problems and hallucinations  Anxiety , depression is better , not quick to response and react doing better than before the son is also doing better that is makes a little bit less anxious and depressed         Past Medical History:   Diagnosis Date    Chronic back pain          Current Outpatient Medications:     escitalopram (LEXAPRO) 10 mg tablet, Take 1 tablet (10 mg total) by mouth daily, Disp: 90 tablet, Rfl: 1    Allergies   Allergen Reactions    Bactrim [Sulfamethoxazole-Trimethoprim] Hives       Social History   Past Surgical History:   Procedure Laterality Date    NO PAST SURGERIES       Family History   Problem Relation Age of Onset    Hypertension Mother     Breast cancer Maternal Aunt        Objective:  /74 (BP Location: Left arm, Patient Position: Sitting, Cuff Size: Large)   Pulse 66   Temp (!) 97 1 °F (36 2 °C) (Temporal)   Ht 5' 3 5" (1 613 m)   Wt 80 3 kg (177 lb)   SpO2 98%   BMI 30 86 kg/m²        Physical Exam   Constitutional: She is oriented to person, place, and time  She appears well-developed and well-nourished  HENT:   Right Ear: External ear normal    Mouth/Throat: Oropharynx is clear and moist    Eyes: Pupils are equal, round, and reactive to light  Conjunctivae and EOM are normal    Neck: Normal range of motion  No JVD present  No thyromegaly present  Cardiovascular: Normal rate, regular rhythm, normal heart sounds and intact distal pulses  Pulmonary/Chest: Breath sounds normal    Abdominal: Soft  Bowel sounds are normal    Musculoskeletal: Normal range of motion  Lymphadenopathy:     She has no cervical adenopathy  Neurological: She is alert and oriented to person, place, and time  She has normal reflexes  Skin: Skin is dry  Psychiatric: She has a normal mood and affect   Her behavior is normal  Judgment and thought content normal

## 2020-11-03 ENCOUNTER — OFFICE VISIT (OUTPATIENT)
Dept: INTERNAL MEDICINE CLINIC | Facility: CLINIC | Age: 50
End: 2020-11-03
Payer: COMMERCIAL

## 2020-11-03 VITALS
HEIGHT: 64 IN | BODY MASS INDEX: 31 KG/M2 | SYSTOLIC BLOOD PRESSURE: 118 MMHG | DIASTOLIC BLOOD PRESSURE: 80 MMHG | HEART RATE: 73 BPM | WEIGHT: 181.6 LBS | TEMPERATURE: 98.2 F | OXYGEN SATURATION: 98 %

## 2020-11-03 DIAGNOSIS — F41.8 ANXIETY WITH DEPRESSION: ICD-10-CM

## 2020-11-03 DIAGNOSIS — G56.03 CARPAL TUNNEL SYNDROME, BILATERAL: ICD-10-CM

## 2020-11-03 DIAGNOSIS — Z12.4 CERVICAL CANCER SCREENING: ICD-10-CM

## 2020-11-03 DIAGNOSIS — E66.9 OBESITY (BMI 30.0-34.9): ICD-10-CM

## 2020-11-03 DIAGNOSIS — Z12.31 ENCOUNTER FOR SCREENING MAMMOGRAM FOR MALIGNANT NEOPLASM OF BREAST: ICD-10-CM

## 2020-11-03 DIAGNOSIS — Z00.00 WELLNESS EXAMINATION: ICD-10-CM

## 2020-11-03 DIAGNOSIS — G56.22 NEUROPATHY OF LEFT ULNAR NERVE AT WRIST: ICD-10-CM

## 2020-11-03 DIAGNOSIS — Z00.00 ROUTINE ADULT HEALTH MAINTENANCE: Primary | ICD-10-CM

## 2020-11-03 PROBLEM — E66.811 OBESITY (BMI 30.0-34.9): Status: ACTIVE | Noted: 2020-11-03

## 2020-11-03 PROCEDURE — 99396 PREV VISIT EST AGE 40-64: CPT | Performed by: INTERNAL MEDICINE

## 2020-11-03 PROCEDURE — 3725F SCREEN DEPRESSION PERFORMED: CPT | Performed by: INTERNAL MEDICINE

## 2020-11-03 PROCEDURE — 1036F TOBACCO NON-USER: CPT | Performed by: INTERNAL MEDICINE

## 2020-11-03 PROCEDURE — 99214 OFFICE O/P EST MOD 30 MIN: CPT | Performed by: INTERNAL MEDICINE

## 2020-11-03 PROCEDURE — 3008F BODY MASS INDEX DOCD: CPT | Performed by: INTERNAL MEDICINE

## 2020-11-03 RX ORDER — ESCITALOPRAM OXALATE 10 MG/1
10 TABLET ORAL DAILY
Qty: 90 TABLET | Refills: 1 | Status: SHIPPED | OUTPATIENT
Start: 2020-11-03 | End: 2022-02-04

## 2020-11-06 ENCOUNTER — LAB (OUTPATIENT)
Dept: LAB | Facility: CLINIC | Age: 50
End: 2020-11-06
Payer: COMMERCIAL

## 2020-11-06 DIAGNOSIS — Z00.00 ROUTINE ADULT HEALTH MAINTENANCE: ICD-10-CM

## 2020-11-06 LAB
ALBUMIN SERPL BCP-MCNC: 3.9 G/DL (ref 3.5–5)
ALP SERPL-CCNC: 71 U/L (ref 46–116)
ALT SERPL W P-5'-P-CCNC: 32 U/L (ref 12–78)
ANION GAP SERPL CALCULATED.3IONS-SCNC: 5 MMOL/L (ref 4–13)
AST SERPL W P-5'-P-CCNC: 20 U/L (ref 5–45)
BASOPHILS # BLD AUTO: 0.02 THOUSANDS/ΜL (ref 0–0.1)
BASOPHILS NFR BLD AUTO: 0 % (ref 0–1)
BILIRUB SERPL-MCNC: 0.65 MG/DL (ref 0.2–1)
BUN SERPL-MCNC: 10 MG/DL (ref 5–25)
CALCIUM SERPL-MCNC: 9.1 MG/DL (ref 8.3–10.1)
CHLORIDE SERPL-SCNC: 107 MMOL/L (ref 100–108)
CHOLEST SERPL-MCNC: 201 MG/DL (ref 50–200)
CO2 SERPL-SCNC: 26 MMOL/L (ref 21–32)
CREAT SERPL-MCNC: 0.62 MG/DL (ref 0.6–1.3)
EOSINOPHIL # BLD AUTO: 0.1 THOUSAND/ΜL (ref 0–0.61)
EOSINOPHIL NFR BLD AUTO: 1 % (ref 0–6)
ERYTHROCYTE [DISTWIDTH] IN BLOOD BY AUTOMATED COUNT: 13.2 % (ref 11.6–15.1)
GFR SERPL CREATININE-BSD FRML MDRD: 105 ML/MIN/1.73SQ M
GLUCOSE P FAST SERPL-MCNC: 98 MG/DL (ref 65–99)
HCT VFR BLD AUTO: 41.7 % (ref 34.8–46.1)
HDLC SERPL-MCNC: 92 MG/DL
HGB BLD-MCNC: 13.4 G/DL (ref 11.5–15.4)
IMM GRANULOCYTES # BLD AUTO: 0.02 THOUSAND/UL (ref 0–0.2)
IMM GRANULOCYTES NFR BLD AUTO: 0 % (ref 0–2)
LDLC SERPL CALC-MCNC: 99 MG/DL (ref 0–100)
LYMPHOCYTES # BLD AUTO: 1.82 THOUSANDS/ΜL (ref 0.6–4.47)
LYMPHOCYTES NFR BLD AUTO: 26 % (ref 14–44)
MCH RBC QN AUTO: 30.1 PG (ref 26.8–34.3)
MCHC RBC AUTO-ENTMCNC: 32.1 G/DL (ref 31.4–37.4)
MCV RBC AUTO: 94 FL (ref 82–98)
MONOCYTES # BLD AUTO: 0.44 THOUSAND/ΜL (ref 0.17–1.22)
MONOCYTES NFR BLD AUTO: 6 % (ref 4–12)
NEUTROPHILS # BLD AUTO: 4.63 THOUSANDS/ΜL (ref 1.85–7.62)
NEUTS SEG NFR BLD AUTO: 67 % (ref 43–75)
NRBC BLD AUTO-RTO: 0 /100 WBCS
PLATELET # BLD AUTO: 279 THOUSANDS/UL (ref 149–390)
PMV BLD AUTO: 9.7 FL (ref 8.9–12.7)
POTASSIUM SERPL-SCNC: 3.8 MMOL/L (ref 3.5–5.3)
PROT SERPL-MCNC: 6.9 G/DL (ref 6.4–8.2)
RBC # BLD AUTO: 4.45 MILLION/UL (ref 3.81–5.12)
SODIUM SERPL-SCNC: 138 MMOL/L (ref 136–145)
TRIGL SERPL-MCNC: 48 MG/DL
TSH SERPL DL<=0.05 MIU/L-ACNC: 1.44 UIU/ML (ref 0.36–3.74)
WBC # BLD AUTO: 7.03 THOUSAND/UL (ref 4.31–10.16)

## 2020-11-06 PROCEDURE — 80053 COMPREHEN METABOLIC PANEL: CPT

## 2020-11-06 PROCEDURE — 36415 COLL VENOUS BLD VENIPUNCTURE: CPT

## 2020-11-06 PROCEDURE — 80061 LIPID PANEL: CPT

## 2020-11-06 PROCEDURE — 85025 COMPLETE CBC W/AUTO DIFF WBC: CPT

## 2020-11-06 PROCEDURE — 84443 ASSAY THYROID STIM HORMONE: CPT

## 2020-11-30 ENCOUNTER — TELEPHONE (OUTPATIENT)
Dept: INTERNAL MEDICINE CLINIC | Age: 50
End: 2020-11-30

## 2020-12-01 DIAGNOSIS — Z20.822 EXPOSURE TO COVID-19 VIRUS: Primary | ICD-10-CM

## 2021-01-26 ENCOUNTER — OFFICE VISIT (OUTPATIENT)
Dept: INTERNAL MEDICINE CLINIC | Facility: CLINIC | Age: 51
End: 2021-01-26
Payer: COMMERCIAL

## 2021-01-26 VITALS
DIASTOLIC BLOOD PRESSURE: 74 MMHG | BODY MASS INDEX: 29.53 KG/M2 | HEART RATE: 78 BPM | OXYGEN SATURATION: 98 % | HEIGHT: 64 IN | SYSTOLIC BLOOD PRESSURE: 122 MMHG | TEMPERATURE: 99.6 F | WEIGHT: 173 LBS

## 2021-01-26 DIAGNOSIS — F41.8 ANXIETY WITH DEPRESSION: Primary | ICD-10-CM

## 2021-01-26 DIAGNOSIS — E66.9 OBESITY (BMI 30.0-34.9): ICD-10-CM

## 2021-01-26 PROCEDURE — 3725F SCREEN DEPRESSION PERFORMED: CPT | Performed by: INTERNAL MEDICINE

## 2021-01-26 PROCEDURE — 3008F BODY MASS INDEX DOCD: CPT | Performed by: INTERNAL MEDICINE

## 2021-01-26 PROCEDURE — 1036F TOBACCO NON-USER: CPT | Performed by: INTERNAL MEDICINE

## 2021-01-26 PROCEDURE — 99214 OFFICE O/P EST MOD 30 MIN: CPT | Performed by: INTERNAL MEDICINE

## 2021-01-26 NOTE — PROGRESS NOTES
Assessment/Plan:     Diagnoses and all orders for this visit:    Anxiety with depression  Patient stop taking the medications she is doing well  Obesity (BMI 30 0-34 9)    diet exercise and weight loss  Discussed about the immunization she will need the Tdap and also zoster vaccine in near future or as soon as she can      BMI Counseling: Body mass index is 30 16 kg/m²  The BMI is above normal  Nutrition recommendations include decreasing portion sizes, encouraging healthy choices of fruits and vegetables and moderation in carbohydrate intake  Exercise recommendations include moderate physical activity 150 minutes/week  Subjective:   Chief Complaint   Patient presents with    Follow-up        Patient ID: Marie oDshi is a 45 y o  female  HPI  This is a very pleasant 45 years young lady who is here today for the regular follow-up of anxiety and depression she was on Lexapro she did very well but she thinks that she does not need it anymore she slowly weaned it off and she is doing well she stopped taking the medication about 2-3 weeks ago I told her if the symptoms come back then she can start the medication and let me know otherwise I will see her back in about 6 month to 1 year  Only big problem is hot flashes she was recently seen by her gyn she does not wanted to take any medication for her hot flashes  Diet exercise and weight loss was recommended she is doing the exercises she lost about 8 lb so far and she will continue to do that she said that that is helping her a lot  The following portions of the patient's history were reviewed and updated as appropriate: allergies, current medications, past family history, past medical history, past social history, past surgical history and problem list     Review of Systems   Constitutional: Negative for chills and fatigue  HENT: Negative for congestion, ear pain, hearing loss, postnasal drip, sinus pressure, sore throat and voice change      Eyes: Negative for pain, discharge and visual disturbance  Respiratory: Negative for cough, chest tightness and shortness of breath  Cardiovascular: Negative for chest pain, palpitations and leg swelling  Gastrointestinal: Negative for abdominal pain, blood in stool, diarrhea, nausea and rectal pain  Endocrine:        Hot flashes   Genitourinary: Negative for difficulty urinating, dysuria and urgency  Musculoskeletal: Negative for arthralgias and joint swelling  Skin: Negative for rash  Allergic/Immunologic: Negative for environmental allergies and food allergies  Neurological: Negative for dizziness, tremors, weakness, numbness and headaches  Hematological: Negative for adenopathy  Psychiatric/Behavioral: Negative for behavioral problems and hallucinations  Past Medical History:   Diagnosis Date    Chronic back pain          Current Outpatient Medications:     escitalopram (LEXAPRO) 10 mg tablet, Take 1 tablet (10 mg total) by mouth daily (Patient not taking: Reported on 1/26/2021), Disp: 90 tablet, Rfl: 1    Allergies   Allergen Reactions    Bactrim [Sulfamethoxazole-Trimethoprim] Hives       Social History   Past Surgical History:   Procedure Laterality Date    NO PAST SURGERIES       Family History   Problem Relation Age of Onset    Hypertension Mother     Breast cancer Maternal Aunt        Objective:  /74 (BP Location: Left arm, Patient Position: Sitting, Cuff Size: Adult)   Pulse 78   Temp 99 6 °F (37 6 °C)   Ht 5' 3 5" (1 613 m)   Wt 78 5 kg (173 lb)   SpO2 98%   BMI 30 16 kg/m²        Physical Exam  Constitutional:       Appearance: She is well-developed  HENT:      Right Ear: External ear normal    Eyes:      Conjunctiva/sclera: Conjunctivae normal       Pupils: Pupils are equal, round, and reactive to light  Neck:      Musculoskeletal: Normal range of motion  Thyroid: No thyromegaly  Vascular: No JVD     Cardiovascular:      Rate and Rhythm: Normal rate and regular rhythm  Heart sounds: Normal heart sounds  Pulmonary:      Breath sounds: Normal breath sounds  Abdominal:      General: Bowel sounds are normal       Palpations: Abdomen is soft  Musculoskeletal: Normal range of motion  Lymphadenopathy:      Cervical: No cervical adenopathy  Skin:     General: Skin is dry  Neurological:      Mental Status: She is alert and oriented to person, place, and time  Deep Tendon Reflexes: Reflexes are normal and symmetric     Psychiatric:         Behavior: Behavior normal

## 2021-04-05 DIAGNOSIS — Z23 ENCOUNTER FOR IMMUNIZATION: ICD-10-CM

## 2021-04-28 ENCOUNTER — TELEPHONE (OUTPATIENT)
Dept: INTERNAL MEDICINE CLINIC | Age: 51
End: 2021-04-28

## 2021-12-12 ENCOUNTER — NURSE TRIAGE (OUTPATIENT)
Dept: OTHER | Facility: OTHER | Age: 51
End: 2021-12-12

## 2021-12-12 DIAGNOSIS — Z20.828 SARS-ASSOCIATED CORONAVIRUS EXPOSURE: Primary | ICD-10-CM

## 2021-12-14 PROCEDURE — U0005 INFEC AGEN DETEC AMPLI PROBE: HCPCS | Performed by: INTERNAL MEDICINE

## 2021-12-14 PROCEDURE — U0003 INFECTIOUS AGENT DETECTION BY NUCLEIC ACID (DNA OR RNA); SEVERE ACUTE RESPIRATORY SYNDROME CORONAVIRUS 2 (SARS-COV-2) (CORONAVIRUS DISEASE [COVID-19]), AMPLIFIED PROBE TECHNIQUE, MAKING USE OF HIGH THROUGHPUT TECHNOLOGIES AS DESCRIBED BY CMS-2020-01-R: HCPCS | Performed by: INTERNAL MEDICINE

## 2022-01-25 ENCOUNTER — RA CDI HCC (OUTPATIENT)
Dept: OTHER | Facility: HOSPITAL | Age: 52
End: 2022-01-25

## 2022-01-25 NOTE — PROGRESS NOTES
[facility]  coding opportunities             Chart Reviewed * (Number of) Inbasket suggestions sent to Provider: 1                  Patients insurance company: [employer] (Medicare and [employer] for [employer] and [employer])     Visit status: Patient canceled the appointment     Provider never responded to [facility]  coding request     [facility]  coding opportunities             Chart Reviewed * (Number of) Inbasket suggestions sent to Provider: 1                  Patients insurance company: [employer] (Medicare and Commercial for [employer] and [employer])           Based on clinical documentation indicated in your record, it appears that the patient may have the following conditions:    Could the depression noted be further classified to one of the following codes?     F33 0  Major depressive disorder, recurrent, mild (HCC) OR  F33 1 Major depressive disorder, recurrent, moderate (HCC)  OR  F33 2  Major depressive disorder, recurrent, severe without psychotic features ([facility] ) OR  F33 8   Other recurrent depressive disorders ([facility] ) OR  F33 9   Major depressive disorder, recurrent, unspecified ([facility] )      If this is correct, please document and assess at your next visit, February 1

## 2022-02-04 ENCOUNTER — OFFICE VISIT (OUTPATIENT)
Dept: INTERNAL MEDICINE CLINIC | Facility: OTHER | Age: 52
End: 2022-02-04
Payer: COMMERCIAL

## 2022-02-04 ENCOUNTER — TELEPHONE (OUTPATIENT)
Dept: PHYSICAL THERAPY | Facility: OTHER | Age: 52
End: 2022-02-04

## 2022-02-04 VITALS
BODY MASS INDEX: 24.75 KG/M2 | TEMPERATURE: 98.2 F | HEART RATE: 84 BPM | HEIGHT: 64 IN | WEIGHT: 145 LBS | DIASTOLIC BLOOD PRESSURE: 80 MMHG | SYSTOLIC BLOOD PRESSURE: 122 MMHG | OXYGEN SATURATION: 99 %

## 2022-02-04 DIAGNOSIS — G89.29 ACUTE EXACERBATION OF CHRONIC LOW BACK PAIN: Primary | ICD-10-CM

## 2022-02-04 DIAGNOSIS — M51.16 LUMBAR DISC HERNIATION WITH RADICULOPATHY: ICD-10-CM

## 2022-02-04 DIAGNOSIS — M54.50 ACUTE EXACERBATION OF CHRONIC LOW BACK PAIN: Primary | ICD-10-CM

## 2022-02-04 PROCEDURE — 3008F BODY MASS INDEX DOCD: CPT

## 2022-02-04 PROCEDURE — 1036F TOBACCO NON-USER: CPT

## 2022-02-04 PROCEDURE — 99213 OFFICE O/P EST LOW 20 MIN: CPT

## 2022-02-04 RX ORDER — CYCLOBENZAPRINE HCL 5 MG
5 TABLET ORAL 3 TIMES DAILY PRN
COMMUNITY
End: 2022-02-04 | Stop reason: ALTCHOICE

## 2022-02-04 RX ORDER — CYCLOBENZAPRINE HCL 5 MG
5 TABLET ORAL 3 TIMES DAILY PRN
Qty: 20 TABLET | Refills: 0 | Status: SHIPPED | OUTPATIENT
Start: 2022-02-04 | End: 2022-06-29

## 2022-02-04 NOTE — PATIENT INSTRUCTIONS
1  Flexeril 5 mg TID as needed for 7 days  2  Physical therapy  3  Continue chiropractor treatment  4  MRI lumbar spine     5  Ambulatory referral to comprehensive spine specialist

## 2022-02-04 NOTE — PROGRESS NOTES
Assessment/Plan:    Lumbar disc herniation with radiculopathy  · Management as above  Acute exacerbation of chronic low back pain  1  Flexeril 5 mg TID as needed for 7 days  2  Physical therapy  3  Continue chiropractor treatment  4  MRI lumbar spine  5  Ambulatory referral to comprehensive spine specialist         Diagnoses and all orders for this visit:    Acute exacerbation of chronic low back pain  -     MRI lumbar spine w wo contrast; Future  -     Ambulatory Referral to Comprehensive Spine PT; Future  -     cyclobenzaprine (FLEXERIL) 5 mg tablet; Take 1 tablet (5 mg total) by mouth 3 (three) times a day as needed for muscle spasms for up to 7 days    Lumbar disc herniation with radiculopathy  -     MRI lumbar spine w wo contrast; Future  -     Ambulatory Referral to Comprehensive Spine PT; Future  -     cyclobenzaprine (FLEXERIL) 5 mg tablet; Take 1 tablet (5 mg total) by mouth 3 (three) times a day as needed for muscle spasms for up to 7 days    Other orders  -     Discontinue: cyclobenzaprine (FLEXERIL) 5 mg tablet; Take 5 mg by mouth 3 (three) times a day as needed for muscle spasms               Subjective:          Patient ID: Willow Narayanan is a 45 y o  female  50-year-old female with history of chronic low back pain presenting with acute exacerbation of low back pain  Patient has been managing back pain with chiropractor, acupuncture and exercise with a   Last acute exacerbation was about a year ago  Patient has been doing well until 3 days ago when she went to bend forward and started feeling low back pain  Later that day she was brushing her teeth when she felt a pop on the left side of her low back  Pain is severe exacerbated by movement  Pain radiates to right side of back and then down the front of her thigh down to the knee  Pain does not radiate below the knee  Patient reports previous imaging showing annular fissures and bulging discs    Patient elected conservative management however condition and has been doing well until this current exacerbation  The following portions of the patient's history were reviewed and updated as appropriate: allergies, current medications, past family history, past medical history, past social history, past surgical history and problem list     Review of Systems   Constitutional: Negative for chills and fever  HENT: Negative for ear pain and sore throat  Eyes: Negative for pain and visual disturbance  Respiratory: Negative for cough and shortness of breath  Cardiovascular: Negative for chest pain and palpitations  Gastrointestinal: Positive for constipation  Negative for abdominal pain and vomiting  Genitourinary: Negative for dysuria and hematuria  Musculoskeletal: Positive for back pain (severe low back pain  )  Negative for arthralgias  Skin: Negative for color change and rash  Neurological: Negative for seizures and syncope  All other systems reviewed and are negative  Past Medical History:   Diagnosis Date    Chronic back pain          Current Outpatient Medications:     cyclobenzaprine (FLEXERIL) 5 mg tablet, Take 1 tablet (5 mg total) by mouth 3 (three) times a day as needed for muscle spasms for up to 7 days, Disp: 20 tablet, Rfl: 0    Allergies   Allergen Reactions    Bactrim [Sulfamethoxazole-Trimethoprim] Hives       Social History   Past Surgical History:   Procedure Laterality Date    NO PAST SURGERIES       Family History   Problem Relation Age of Onset    Hypertension Mother     Breast cancer Maternal Aunt        Objective:  /80 (BP Location: Left arm, Patient Position: Sitting, Cuff Size: Adult)   Pulse 84   Temp 98 2 °F (36 8 °C)   Ht 5' 4" (1 626 m)   Wt 65 8 kg (145 lb)   SpO2 99%   BMI 24 89 kg/m²   Body mass index is 24 89 kg/m²  Physical Exam  Constitutional:       General: She is not in acute distress  Appearance: Normal appearance   She is not ill-appearing or diaphoretic  HENT:      Head: Normocephalic and atraumatic  Nose: Nose normal  No congestion or rhinorrhea  Mouth/Throat:      Mouth: Mucous membranes are moist       Pharynx: No oropharyngeal exudate or posterior oropharyngeal erythema  Eyes:      General:         Right eye: No discharge  Left eye: No discharge  Pupils: Pupils are equal, round, and reactive to light  Cardiovascular:      Rate and Rhythm: Normal rate and regular rhythm  Pulses: Normal pulses  Heart sounds: Normal heart sounds  No murmur heard  No gallop  Pulmonary:      Effort: Pulmonary effort is normal       Breath sounds: Normal breath sounds  No wheezing, rhonchi or rales  Abdominal:      General: Bowel sounds are normal  There is no distension  Palpations: Abdomen is soft  Tenderness: There is no abdominal tenderness  There is no guarding or rebound  Musculoskeletal:         General: Tenderness (Low back) present  Normal range of motion  Cervical back: Normal range of motion and neck supple  No rigidity or tenderness  Right lower leg: No edema  Left lower leg: No edema  Lymphadenopathy:      Cervical: No cervical adenopathy  Skin:     General: Skin is warm and dry  Capillary Refill: Capillary refill takes less than 2 seconds  Coloration: Skin is not jaundiced or pale  Findings: No lesion or rash  Neurological:      General: No focal deficit present  Mental Status: She is alert and oriented to person, place, and time  Cranial Nerves: No cranial nerve deficit  Sensory: No sensory deficit  Motor: No weakness     Psychiatric:         Mood and Affect: Mood normal          Behavior: Behavior normal

## 2022-02-04 NOTE — TELEPHONE ENCOUNTER
Called patient per VM left today @ 2:00 pm     Patient has a referral already in place for Adv Spine PT  Patient informed that she just needs to call the PT site and schedule her appointment  Patient provided with the ph# to our PT office in [facility]  Patient appreciative foir the call and assistance

## 2022-02-07 NOTE — PROGRESS NOTES
PT Evaluation     Today's date: 2022  Patient name: Nj Mendoza  : 1970  MRN: [id number]  Referring provider: Carlos Reza DO  Dx:   Encounter Diagnosis     ICD-10-CM    1  Acute exacerbation of chronic low back pain  M54 50 Ambulatory Referral to Comprehensive Spine PT    G89 [id number]    2  Lumbar disc herniation with radiculopathy  M51 16 Ambulatory Referral to Comprehensive Spine PT                  Assessment  Assessment details: Nj Mendoza is a pleasant 45 y o  presenting to physical therapy with MD referral for Acute exacerbation of chronic low back pain and Lumbar disc herniation with radiculopathy  Pt presents with increasing frequency of episodic lower back pain which began 10 years ago with most recent flare on 22  Pt presents with signs and symptoms suggestive of poor hip/core strength as well as reduced neuromuscular control with abberrent movements on return from fully flexed position  Pt states she is very fearful of reinjury and tries to consciously think prior to any motion involving flexion  Pt states significant frustration with increasing episodes of lower back pain despite consistently using a  and losing 35# over the past 1-2 years  Problem list:  Limited lumbar ROM, decreased hip/core strength, increased muscle tension, decreased neuromuscular control, and poor squat mechanics    Treatment to include: Manual therapy techniques, lower extremity/core strengthening, neuromuscular control exercises, balance/proprioception training, instruction in a comprehensive HEP, and modalities as needed  This pt would benefit from skilled PT services to address their impairments and functional limitations to maximize functional outcome      Symptom irritability: moderateBarriers to therapy: Chronicity of symptoms, high fear levels associated with movement, depression, anxiety  Understanding of Dx/Px/POC: good   Prognosis: fair  Prognosis details: Fair due to chronicity of symptoms and high fear avoidance    Goals  ST  Pt will be able to maintain neural lumbar spine with resisted hip rotation in hooklying in 4 weeks  2  Pt will improve hip abduction strength to at least 4/5 in 4 weeks  LT  Pt will be able to transfer sit to stand with minimal to no pain in 8 weeks  2  Pt will be independent in a comprehensive HEP in 8 weeks  3  Pt will report minimal to no fear performing lumbar flexion activities in 8 weeks  Plan  Plan details: 1-2 x per week for 6-8 weeks    START BACK TOOL: HIGH RISK (8-12 visits)  Lumbar Treatment Classification: stabilization  Patient would benefit from: skilled physical therapy  Frequency: 2x week  Duration in weeks: 8  Treatment plan discussed with: patient        Subjective Evaluation    History of Present Illness  Mechanism of injury: Pt reports she began to experience lower back pain shortly after having her child in   Pt states she fell after a spin class 4 weeks after childbirth  Pt states at the time, she was only offered muscle relaxors by PCP which she was unable to take due to breastfeeding  Pt states she went to a chiropractor at that time who helped her to return to walking in 5 days  Pt states since initial injury, she was experiencing pain episodes 3-4 times per year; however, over the past two years she will tweak her back 6-8 times per year typically due to flexion  Pt states her symptoms will occur on L > R  Pt sees the chiropractor to resolve her pain  Patient reports she has had some success with acupuncture in the past and present  Pt states currently, she has issues with lumbar flexion causing popping in her lower back  Pt has history of x-rays and MRI of lumbar spine where she was diagnosed with annular tears and bulging discs approximately 7 yrs ago  Pt had second opinion at [facility] [place] as well around that time  Pt also has history of a shot in lower back which was successful    Pt states 2020, she began to utilize a  to focus on strengthening and weight loss (35#)  Pt reports this past Tuesday, she bent forward and upon standing, she felt tension across lower back  Shortly afterwards, pt was standing with slight flexion to brush her teeth and felt another pop and then severe pain her lower back and numbness/tingling/pain down anterior thigh on L  Pt saw chiropractor 3 x last week with some improvement in motion, but no change in pain  Pt saw PCP who prescribed pt flexeril  Pt saw accupunture on Friday with improvement the following day  Pt saw chiropractor again yesterday who helped her reduce pain for the first time and resolve radicular symptoms  Aggravating factors: prolonged sitting, lumbar flexion      Premorbid status:  - ADLs: Independent with no difficulty  - Work: Full time, Full duty- desk work   - Recreation: exercise 4-5 times per week (cardio and resistance training with a )    Current status:   - ADLs/Functional activities:    - Stairs Reciprocal pattern with Pain Levels: mild pain   - Sit to stand with feeling of weakness and fear of injury   - Walking unlimited   - Standing 15-20 minutes prior to fatigue in back and legs   - Sitting 2-3 minutes prior to increase in pain (better with firm chair)   - Sleeping with 3-7 x nightly sleep disturbances due to pain   - Bending forward to don/doff socks and shoes at a very slow pace - typically modifies due to fear of injury   - Lifting now avoiding due to fear of pain   - Carrying now avoiding due to fear of pain  - Work: Full time, Full duty- taking PTO days as needed  - Recreation: unable to workout due to pain  Pain  Current pain rating: 3  At best pain rating: 3  At worst pain ratin  Location: left side of lower lumbar spine  Quality: tight and dull ache  Relieving factors: medications  Progression: improved    Treatments  Previous treatment: injection treatment, physical therapy, chiropractic and medication  Current treatment: chiropractic, medication and physical therapy  Patient Goals  Patient goals for therapy: decreased pain, increased motion, increased strength and independence with ADLs/IADLs          Objective     Palpation   Left   Muscle spasm in the erector spinae and quadratus lumborum  Tenderness of the erector spinae and quadratus lumborum  Active Range of Motion     Lumbar   Flexion: 110 (return with Nathalia's sign and abberent movements) degrees   Extension: 12 (pain and tension on L lumbar) degrees   Left lateral flexion: 5 degrees       Right lateral flexion: 10 degrees     Joint Play   Joints within functional limits: L3, L4 and L5     Hypomobile: L1 and L2     Pain: L4     Strength/Myotome Testing     Left Hip   Planes of Motion   Flexion: 4+  Abduction: 3+  External rotation: 3+  Internal rotation: 3+    Right Hip   Planes of Motion   Flexion: 4+  Abduction: 3+  External rotation: 3+  Internal rotation: 3+    Left Knee   Flexion: 5  Extension: 5    Right Knee   Flexion: 5  Extension: 5    Left Ankle/Foot   Dorsiflexion: 5  Plantar flexion: 5    Right Ankle/Foot   Dorsiflexion: 5  Plantar flexion: 5    Additional Strength Details  SLS L: 30 seconds with contralateral hip drop  SLS R: 30 seconds with contralateral hip drop    Flexibility:  - HS: > 90 deg B  - Hip IR: minimal restriction  - Hip ER: minimal restriction  - Hip Flexion: no restriction  - Hip Extension:    Double leg Squat: squat to 70 degrees knee flexion with posterior pelvic tilt on initiation of movement      Gait: Pt ambulates over level surface with no deviations    Muscle Activation     Additional Muscle Activation Details  Lumbar stabilization:  - unable to maintain neutral lumbar spine with resisted hip rotation in hooklying bilaterally  - unable to maintain neutral lumbar spine with resisted hip extension in supine with legs extended bilaterally    Pt is hamstring dominant bilaterally assessed by pt performing SLR hip extension in prone with palpation of superior aspect of glutes and proximal hamstrings  Tests   Cervical   Negative vertical compression  Lumbar   Negative vertical compression and SIJ compression  Left   Negative quadrant and slump test      Right   Negative quadrant and slump test      Left Hip   Negative SENTHIL  Right Hip   Negative SENTHIL       Additional Tests Details  No leg length discrepancy noted             Precautions: anxiety, depression, chronicity of symptoms      Manuals 2-8 (IE)                                                                Neuro Re-Ed             Supine with biofeedback:             - PPT 10 x 10" NV            - TA 10 x 10" NV            - TA with alt march 10 x 5" ea NV            - TA with BKFO 10 x 5" ea NV                         TA with bridges with TB around knees                          Prone:             - glute sets (focus on relax HS, only glutes) 10 x 5" ea NV            - alt hip ext (glutes first, then HS) 10 x 5" ea NV            - alt UE flex 10 x 5" ea NV                         Quadruped:             - alt UE lift             - alt LE lift             - opp arm/opp leg lift                          Seated on pball:                          TB:(focus on maintaining TA contraction)             - rows             - pulldowns             - thoracic antirotation             - elliott lumbar, thoracic rotation             - double arm chops             - double arm lifts                          Ther Ex             NuStep 8 mins, L5 NV                         Standing:             - TB hip abd (30 deg) 2 x 10 ea RTB NV            - TB hip ext 2 x 10 ea RTB NV            - lateral band walks             - retroband walks                          SL hip ER 2 x 10 ea RTB NV                                      Ther Activity             Squat retraining                          Gait Training                                       Modalities Cryo/MH as needed                            * On initial evaluation, educated pt on anatomy, pathology, and exercise rationale  Educated pt to hold from gym routine for at least 2 weeks until we see the affect of PT on her symptoms  Educated pt to call with any questions or concerns

## 2022-02-08 ENCOUNTER — EVALUATION (OUTPATIENT)
Dept: PHYSICAL THERAPY | Facility: CLINIC | Age: 52
End: 2022-02-08
Payer: COMMERCIAL

## 2022-02-08 DIAGNOSIS — M51.16 LUMBAR DISC HERNIATION WITH RADICULOPATHY: ICD-10-CM

## 2022-02-08 DIAGNOSIS — M54.50 ACUTE EXACERBATION OF CHRONIC LOW BACK PAIN: ICD-10-CM

## 2022-02-08 DIAGNOSIS — G89.29 ACUTE EXACERBATION OF CHRONIC LOW BACK PAIN: ICD-10-CM

## 2022-02-08 PROCEDURE — 97162 PT EVAL MOD COMPLEX 30 MIN: CPT | Performed by: PHYSICAL THERAPIST

## 2022-02-10 NOTE — PROGRESS NOTES
Daily Note     Today's date: 2022  Patient name: Lori Stuart  : 1970  MRN: [id number]  Referring provider: Yazmin Ward DO  Dx:   Encounter Diagnosis     ICD-10-CM    1  Lumbar disc herniation with radiculopathy  M51 16    2  Acute exacerbation of chronic low back pain  M54 50     G89 29                   Subjective: Pt reports no significant discomfort at initiation of session  Objective: See treatment diary below      Assessment: Initiated neuromuscular re-education exercises this date to aid pt in engaging TA and maintaining neutral spine posture  Pt demonstrated difficulty maintaining TA contraction with movement secondary to muscle fatigue and decreased core muscle coordination  Pt also demonstrated significant difficulty engaging gluteal muscles prior to activation of HS muscles  Improvement noted with knee flexed to relax HS muscles  Pt was provided written HEP to practice neuromuscular stabilization exercises at home as well  Tolerated treatment well  Patient demonstrated fatigue post treatment and would benefit from continued PT      Plan: Progress treatment as tolerated  Precautions: anxiety, depression, chronicity of symptoms  HEP: Access Code:  [email or web address] (updated: )          Manuals 2-8 (IE) 2-11                                                               Neuro Re-Ed             Supine with biofeedback:             - PPT 10 x 10" NV            - TA 10 x 10" NV 10 x 10"           - TA with alt march 10 x 5" ea NV 10 x 5" ea           - TA with BKFO 10 x 5" ea NV 10 x 5" ea                        TA with bridges with TB around knees                          Prone:             - glute sets (focus on relax HS, only glutes) 10 x 5" ea NV 10 x 5" ea           - alt hip ext (glutes first, then HS) 10 x 5" ea NV            - alt UE flex 10 x 5" ea NV                         Quadruped:             - alt UE lift             - alt LE lift             - opp arm/opp leg lift Seated on pball:                          TB:(focus on maintaining TA contraction)             - rows             - pulldowns             - thoracic antirotation             - elliott lumbar, thoracic rotation             - double arm chops             - double arm lifts                          Ther Ex             NuStep 8 mins, L5 NV 10 mins, L5                        Standing:             - TB hip abd (30 deg) 2 x 10 ea RTB NV            - TB hip ext 2 x 10 ea RTB NV            - lateral band walks             - retroband walks                          SL hip ER 2 x 10 ea RTB NV 1 x 10 ea RTB                                     Ther Activity             Squat retraining                          Gait Training                                       Modalities             Cryo/MH as needed

## 2022-02-11 ENCOUNTER — APPOINTMENT (OUTPATIENT)
Dept: PHYSICAL THERAPY | Facility: CLINIC | Age: 52
End: 2022-02-11
Payer: COMMERCIAL

## 2022-02-11 ENCOUNTER — OFFICE VISIT (OUTPATIENT)
Dept: PHYSICAL THERAPY | Facility: CLINIC | Age: 52
End: 2022-02-11
Payer: COMMERCIAL

## 2022-02-11 DIAGNOSIS — M51.16 LUMBAR DISC HERNIATION WITH RADICULOPATHY: Primary | ICD-10-CM

## 2022-02-11 DIAGNOSIS — M54.50 ACUTE EXACERBATION OF CHRONIC LOW BACK PAIN: ICD-10-CM

## 2022-02-11 DIAGNOSIS — G89.29 ACUTE EXACERBATION OF CHRONIC LOW BACK PAIN: ICD-10-CM

## 2022-02-11 PROCEDURE — 97112 NEUROMUSCULAR REEDUCATION: CPT | Performed by: PHYSICAL THERAPIST

## 2022-02-14 NOTE — PROGRESS NOTES
Daily Note     Today's date: 2/15/2022  Patient name: Abiola Zuniga  : 1970  MRN: [id number]  Referring provider: Zenobia Harris DO  Dx:   Encounter Diagnosis     ICD-10-CM    1  Lumbar disc herniation with radiculopathy  M51 16    2  Acute exacerbation of chronic low back pain  M54 50     G89 29                   Subjective: Pt reports she has been practicing the TA and exercises at home and has been experiencing a good muscle soreness  Objective: See treatment diary below      Assessment: Progressed exercises this session as charted to increase hip/core strength  Pt continues to be challenged by engaging TA while performing leg motions  Tolerated treatment well  Patient demonstrated fatigue post treatment, exhibited good technique with therapeutic exercises and would benefit from continued PT      Plan: Progress treatment as tolerated  Precautions: anxiety, depression, chronicity of symptoms  HEP: Access Code:  [email or web address] (updated: )          Manuals 2-8 (IE) -15                                                              Neuro Re-Ed             Supine with biofeedback:             - PPT 10 x 10" NV            - TA 10 x 10" NV 10 x 10" 10 x 10"          - TA with alt march 10 x 5" ea NV 10 x 5" ea 10 x 5" ea          - TA with BKFO 10 x 5" ea NV 10 x 5" ea 10 x 5" ea                       TA with bridges with TB around knees                          Prone:             - glute sets (focus on relax HS, only glutes) 10 x 5" ea NV 10 x 5" ea           - alt hip ext (glutes first, then HS) 10 x 5" ea NV            - alt UE flex 10 x 5" ea NV                         Quadruped:             - alt UE lift             - alt LE lift             - opp arm/opp leg lift                          Seated on pball:                          TB:(focus on maintaining TA contraction)             - rows             - pulldowns             - thoracic antirotation             - elliott lumbar, thoracic rotation - double arm chops             - double arm lifts                          Ther Ex             NuStep 8 mins, L5 NV 10 mins, L5 10 mins, L5                       Standing:             - TB hip abd (30 deg) 2 x 10 ea RTB NV  2 x 10 ea RTB          - TB hip ext 2 x 10 ea RTB NV  2 x 10 ea RTB          - lateral band walks             - retroband walks                          SL hip ER 2 x 10 ea RTB NV 1 x 10 ea RTB 2 x 10 ea RTB                                    Ther Activity             Squat retraining                          Gait Training                                       Modalities             Cryo/MH as needed

## 2022-02-15 ENCOUNTER — HOSPITAL ENCOUNTER (OUTPATIENT)
Dept: MRI IMAGING | Facility: HOSPITAL | Age: 52
Discharge: HOME | End: 2022-02-15
Payer: COMMERCIAL

## 2022-02-15 ENCOUNTER — OFFICE VISIT (OUTPATIENT)
Dept: PHYSICAL THERAPY | Facility: CLINIC | Age: 52
End: 2022-02-15
Payer: COMMERCIAL

## 2022-02-15 ENCOUNTER — APPOINTMENT (OUTPATIENT)
Dept: PHYSICAL THERAPY | Facility: CLINIC | Age: 52
End: 2022-02-15
Payer: COMMERCIAL

## 2022-02-15 DIAGNOSIS — M51.16 LUMBAR DISC HERNIATION WITH RADICULOPATHY: ICD-10-CM

## 2022-02-15 DIAGNOSIS — M54.50 ACUTE EXACERBATION OF CHRONIC LOW BACK PAIN: ICD-10-CM

## 2022-02-15 DIAGNOSIS — G89.29 ACUTE EXACERBATION OF CHRONIC LOW BACK PAIN: ICD-10-CM

## 2022-02-15 DIAGNOSIS — M51.16 LUMBAR DISC HERNIATION WITH RADICULOPATHY: Primary | ICD-10-CM

## 2022-02-15 PROCEDURE — 72158 MRI LUMBAR SPINE W/O & W/DYE: CPT

## 2022-02-15 PROCEDURE — 97110 THERAPEUTIC EXERCISES: CPT | Performed by: PHYSICAL THERAPIST

## 2022-02-15 PROCEDURE — A9585 GADOBUTROL INJECTION: HCPCS

## 2022-02-15 PROCEDURE — G1004 CDSM NDSC: HCPCS

## 2022-02-15 PROCEDURE — 97112 NEUROMUSCULAR REEDUCATION: CPT | Performed by: PHYSICAL THERAPIST

## 2022-02-15 RX ADMIN — GADOBUTROL 7.5 ML: 604.72 INJECTION INTRAVENOUS at 07:04

## 2022-02-17 NOTE — PROGRESS NOTES
Daily Note     Today's date: 2022  Patient name: Kike Steel  : 1970  MRN: [id number]  Referring provider: Mary Hurst DO  Dx:   Encounter Diagnosis     ICD-10-CM    1  Acute exacerbation of chronic low back pain  M54 50     G89 29    2  Lumbar disc herniation with radiculopathy  M51 16                   Subjective: Pt reports she currently has no pain at onset of session other than soreness in gluteal muscles  Pt reports she was unable to perform Hep due to busy schedule  Objective: See treatment diary below      Assessment: Progressed exercises this session as charted to enhance functional strength and stability  Pt's goal is to return to spinning; therefore, initiating spin bike into warm-up this date  Pt continues to be challenged by stabilization exercises  Pt required minimal verbal cues to avoid PPT with bridges this date  Tolerated treatment well  Patient demonstrated fatigue post treatment and would benefit from continued PT      Plan: Progress treatment as tolerated  Precautions: anxiety, depression, chronicity of symptoms  HEP: Access Code:  [email or web address] (updated: )          Manuals 2-8 (IE) 2-11 2-15 2-18                                                             Neuro Re-Ed             Supine with biofeedback:             - PPT 10 x 10" NV            - TA 10 x 10" NV 10 x 10" 10 x 10"          - TA with alt march 10 x 5" ea NV 10 x 5" ea 10 x 5" ea 10 x 5" ea         - TA with BKFO 10 x 5" ea NV 10 x 5" ea 10 x 5" ea 10 x 5" ea                      TA with bridges with TB around knees    2 x 10 RTB                      Prone:             - glute sets (focus on relax HS, only glutes) 10 x 5" ea NV 10 x 5" ea           - alt hip ext (glutes first, then HS) 10 x 5" ea NV            - alt UE flex 10 x 5" ea NV                         Quadruped:             - alt UE lift             - alt LE lift             - opp arm/opp leg lift                          Seated on pball: TB:(focus on maintaining TA contraction)             - rows             - pulldowns             - thoracic antirotation             - elliott lumbar, thoracic rotation             - double arm chops             - double arm lifts                          Ther Ex             NuStep 8 mins, L5 NV 10 mins, L5 10 mins, L5          Spin bike    10 mins (mod intensity per pt)         Standing:             - TB hip abd (30 deg) 2 x 10 ea RTB NV  2 x 10 ea RTB          - TB hip ext 2 x 10 ea RTB NV  2 x 10 ea RTB          - lateral band walks    2 mins timed RTB         - retroband walks    2 mins timed RTB                      SL hip ER 2 x 10 ea RTB NV 1 x 10 ea RTB 2 x 10 ea RTB 2 x 10 ea RTB                                   Ther Activity             Squat retraining                          Gait Training                                       Modalities             Cryo/MH as needed

## 2022-02-18 ENCOUNTER — OFFICE VISIT (OUTPATIENT)
Dept: PHYSICAL THERAPY | Facility: CLINIC | Age: 52
End: 2022-02-18
Payer: COMMERCIAL

## 2022-02-18 DIAGNOSIS — M54.50 ACUTE EXACERBATION OF CHRONIC LOW BACK PAIN: Primary | ICD-10-CM

## 2022-02-18 DIAGNOSIS — G89.29 ACUTE EXACERBATION OF CHRONIC LOW BACK PAIN: Primary | ICD-10-CM

## 2022-02-18 DIAGNOSIS — M51.16 LUMBAR DISC HERNIATION WITH RADICULOPATHY: ICD-10-CM

## 2022-02-18 PROCEDURE — 97112 NEUROMUSCULAR REEDUCATION: CPT | Performed by: PHYSICAL THERAPIST

## 2022-02-18 PROCEDURE — 97110 THERAPEUTIC EXERCISES: CPT | Performed by: PHYSICAL THERAPIST

## 2022-02-28 NOTE — PROGRESS NOTES
Daily Note     Today's date: 3/1/2022  Patient name: Nannette Chavez  : 1970  MRN: [id number]  Referring provider: Zechariah Dennis DO  Dx:   Encounter Diagnosis     ICD-10-CM    1  Acute exacerbation of chronic low back pain  M54 50     G89 29    2  Lumbar disc herniation with radiculopathy  M51 16                   Subjective: Pt reports she had a flare of her lower back pain two days into her trip to [facility]  Pt does not recall any specific activity that she performed to cause the flare  Pt states she went to a chiropractor and accupuncturist with some improvement in symptoms  Objective: See treatment diary below      Assessment: Examination of lumbar spine revealed no peripheralization of symptoms and no sharp pain with ROM which was WNL this date  Pt was hip hinging with lumbar flexion;however, with verbal cues was able to perform with appropriate lumbar ROM  Negative SENTHIL test, negative leg length discrepancy  Pt did present with a laterally flexed sacrum which was reduced following manual therapy techniques  Pt reported reduced pain and discomfort with walking following manual therapy techniques  Tolerated treatment well  Patient demonstrated fatigue post treatment, exhibited good technique with therapeutic exercises and would benefit from continued PT      Plan: Progress treatment as tolerated  Precautions: anxiety, depression, chronicity of symptoms  HEP: Access Code:  [email or web address] (updated: )          Manuals 2-8 (IE) 2-11 2-15 2-18 3-1        Left PA mobilization to sacrum     JG grade IV                                               Neuro Re-Ed             Supine with biofeedback:             - PPT 10 x 10" NV    10 x 10", no biofeedback        - TA 10 x 10" NV 10 x 10" 10 x 10"          - TA with alt march 10 x 5" ea NV 10 x 5" ea 10 x 5" ea 10 x 5" ea 10 x 5" ea        - TA with BKFO 10 x 5" ea NV 10 x 5" ea 10 x 5" ea 10 x 5" ea 10 x 5" ea                     TA with bridges with TB around knees    2 x 10 RTB                      Prone:             - glute sets (focus on relax HS, only glutes) 10 x 5" ea NV 10 x 5" ea           - alt hip ext (glutes first, then HS) 10 x 5" ea NV            - alt UE flex 10 x 5" ea NV                         Quadruped:             - alt UE lift             - alt LE lift             - opp arm/opp leg lift                          Seated on pball:                          TB:(focus on maintaining TA contraction)             - rows             - pulldowns             - thoracic antirotation             - elliott lumbar, thoracic rotation             - double arm chops             - double arm lifts                          Ther Ex             NuStep 8 mins, L5 NV 10 mins, L5 10 mins, L5  10 mins, L4        Spin bike    10 mins (mod intensity per pt)         Standing:             - TB hip abd (30 deg) 2 x 10 ea RTB NV  2 x 10 ea RTB          - TB hip ext 2 x 10 ea RTB NV  2 x 10 ea RTB          - lateral band walks    2 mins timed RTB         - retroband walks    2 mins timed RTB                      SL hip ER 2 x 10 ea RTB NV 1 x 10 ea RTB 2 x 10 ea RTB 2 x 10 ea RTB                                   Ther Activity             Squat retraining                          Gait Training                                       Modalities             Cryo/MH as needed

## 2022-03-01 ENCOUNTER — OFFICE VISIT (OUTPATIENT)
Dept: PHYSICAL THERAPY | Facility: CLINIC | Age: 52
End: 2022-03-01
Payer: COMMERCIAL

## 2022-03-01 DIAGNOSIS — G89.29 ACUTE EXACERBATION OF CHRONIC LOW BACK PAIN: Primary | ICD-10-CM

## 2022-03-01 DIAGNOSIS — M51.16 LUMBAR DISC HERNIATION WITH RADICULOPATHY: ICD-10-CM

## 2022-03-01 DIAGNOSIS — M54.50 ACUTE EXACERBATION OF CHRONIC LOW BACK PAIN: Primary | ICD-10-CM

## 2022-03-01 PROCEDURE — 97112 NEUROMUSCULAR REEDUCATION: CPT | Performed by: PHYSICAL THERAPIST

## 2022-03-01 PROCEDURE — 97140 MANUAL THERAPY 1/> REGIONS: CPT | Performed by: PHYSICAL THERAPIST

## 2022-03-01 PROCEDURE — 97110 THERAPEUTIC EXERCISES: CPT | Performed by: PHYSICAL THERAPIST

## 2022-03-02 ENCOUNTER — RA CDI HCC (OUTPATIENT)
Dept: OTHER | Facility: HOSPITAL | Age: 52
End: 2022-03-02

## 2022-03-02 NOTE — PROGRESS NOTES
[facility]  coding opportunities             Chart Reviewed * (Number of) Inbasket suggestions sent to Provider: 1        F41 8 Anxiety with depression  *Could the depression noted be further classified to one of the following codes?     F33 0  Major depressive disorder, recurrent, mild (HCC) OR  F33 1 Major depressive disorder, recurrent, moderate (HCC)  OR  F33 2  Major depressive disorder, recurrent, severe without psychotic features ([facility] ) OR  F33 8   Other recurrent depressive disorders ([facility] ) OR  F33 9   Major depressive disorder, recurrent, unspecified ([place] [address] )      If this is correct, please document and assess at your next visit,3/9/2022            Patients insurance company: [employer] (Medicare and Commercial for [employer] and [employer])

## 2022-03-03 NOTE — PROGRESS NOTES
Daily Note     Today's date: 3/4/2022  Patient name: Lewis Quintana  : 1970  MRN: [id number]  Referring provider: Marcin Chance DO  Dx:   Encounter Diagnosis     ICD-10-CM    1  Acute exacerbation of chronic low back pain  M54 50     G89 29    2  Lumbar disc herniation with radiculopathy  M51 16                   Subjective: Pt states she has been in her flare for 1 5 weeks which is not typical for her  Pt does report some relief of symptoms following last session  Pt states she feels her chair may be prolonging her pain  Objective: See treatment diary below      Assessment: Majority of session this date spent on patient education pertaining to proper sitting postures, workspace ergonomics, and body mechanics  Emailed pt [employer] workspace ergonomics computer etool hyperlink to aid in reducing strain on lower back  Pt is anxious to return to working out with her   Brief time spent discussing which exercises pt should avoid (MB slams, plyometics, MB twist slams)  Requested pt take my business card and have  contact me directly so we can discuss specific exercises pt is performing with  to ensure they are appropriate for her lower back at this time  Also discussed proper sitting postures at home and educated pt to avoid crossing legs and sitting on legs while relaxing at home  Pt was very appreciative of time spent on education this date  Pt did preesnt with prominent sacral base on L this date which was resolved following manual therapy techniques  Pt reported reduction in pain at conclusion of session  Tolerated treatment well  Patient would benefit from continued PT      Plan: Progress treatment as tolerated  Precautions: anxiety, depression, chronicity of symptoms  HEP: Access Code:  [email or web address] (updated: )          Manuals 2-8 (IE) 2-11 2-15 2-18 3-1 3-4       Left PA mobilization to sacrum     JG grade IV JG grade IV                                              Neuro Re-Ed             Supine with biofeedback:             - PPT 10 x 10" NV    10 x 10", no biofeedback        - TA 10 x 10" NV 10 x 10" 10 x 10"          - TA with alt march 10 x 5" ea NV 10 x 5" ea 10 x 5" ea 10 x 5" ea 10 x 5" ea        - TA with BKFO 10 x 5" ea NV 10 x 5" ea 10 x 5" ea 10 x 5" ea 10 x 5" ea                     TA with bridges with TB around knees    2 x 10 RTB                      Prone:             - glute sets (focus on relax HS, only glutes) 10 x 5" ea NV 10 x 5" ea           - alt hip ext (glutes first, then HS) 10 x 5" ea NV            - alt UE flex 10 x 5" ea NV                         Quadruped:             - alt UE lift             - alt LE lift             - opp arm/opp leg lift                          Seated on pball:                          TB:(focus on maintaining TA contraction)             - rows             - pulldowns             - thoracic antirotation             - elliott lumbar, thoracic rotation             - double arm chops             - double arm lifts                          Ther Ex             NuStep 8 mins, L5 NV 10 mins, L5 10 mins, L5  10 mins, L4 10 mins   L5       Spin bike    10 mins (mod intensity per pt)         Standing:             - TB hip abd (30 deg) 2 x 10 ea RTB NV  2 x 10 ea RTB          - TB hip ext 2 x 10 ea RTB NV  2 x 10 ea RTB          - lateral band walks    2 mins timed RTB         - retroband walks    2 mins timed RTB                      SL hip ER 2 x 10 ea RTB NV 1 x 10 ea RTB 2 x 10 ea RTB 2 x 10 ea RTB                                   Ther Activity             Squat retraining                          Gait Training                                       Modalities             Cryo/MH as needed

## 2022-03-04 ENCOUNTER — OFFICE VISIT (OUTPATIENT)
Dept: PHYSICAL THERAPY | Facility: CLINIC | Age: 52
End: 2022-03-04
Payer: COMMERCIAL

## 2022-03-04 DIAGNOSIS — G89.29 ACUTE EXACERBATION OF CHRONIC LOW BACK PAIN: Primary | ICD-10-CM

## 2022-03-04 DIAGNOSIS — M54.50 ACUTE EXACERBATION OF CHRONIC LOW BACK PAIN: Primary | ICD-10-CM

## 2022-03-04 DIAGNOSIS — M51.16 LUMBAR DISC HERNIATION WITH RADICULOPATHY: ICD-10-CM

## 2022-03-04 PROCEDURE — 97140 MANUAL THERAPY 1/> REGIONS: CPT | Performed by: PHYSICAL THERAPIST

## 2022-03-04 PROCEDURE — 97110 THERAPEUTIC EXERCISES: CPT | Performed by: PHYSICAL THERAPIST

## 2022-03-04 PROCEDURE — 97112 NEUROMUSCULAR REEDUCATION: CPT | Performed by: PHYSICAL THERAPIST

## 2022-03-07 NOTE — PROGRESS NOTES
Daily Note     Today's date: 3/8/2022  Patient name: Kike Steel  : 1970  MRN: [id number]  Referring provider: Mary Hurst DO  Dx:   Encounter Diagnosis     ICD-10-CM    1  Lumbar disc herniation with radiculopathy  M51 16    2  Acute exacerbation of chronic low back pain  M54 50     G89 29                   Subjective: Pt reports she is feeling great today and is having no lower back pain  Pt states she was able to do a full spin workout over the weekend and had no issues  Objective: See treatment diary below      Assessment: Progressed exercises this session as charted to enhance functional core strength and stability  Pt was able to tolerate progressions without increase in pain  Pt demonstrated difficulty maintaining neutral spine posture during quadruped leg lifts this date requiring moderate verbal and tactile cues to perform with correct technique  Pt showed PT her typical workouts with her  and we reviewed together what she should do and what she should avoid  At this time, recommended pt avoid plyometrics and twisting motions  Email was sent to pt's  yesterday to update on the evaluation findings and also recommended to avoid plyometrics  Tolerated treatment well  Patient demonstrated fatigue post treatment and would benefit from continued PT      Plan: Progress note during next visit  Precautions: anxiety, depression, chronicity of symptoms  HEP: Access Code:  [email or web address] (updated: )          Manuals 2-8 (IE) 2-11 2-15 2-18 3-1 3-4 3-8      Left PA mobilization to sacrum     JG grade IV JG grade IV                                              Neuro Re-Ed             Supine with biofeedback:             - PPT 10 x 10" NV    10 x 10", no biofeedback        - TA 10 x 10" NV 10 x 10" 10 x 10"          - TA with alt march 10 x 5" ea NV 10 x 5" ea 10 x 5" ea 10 x 5" ea 10 x 5" ea        - TA with BKFO 10 x 5" ea NV 10 x 5" ea 10 x 5" ea 10 x 5" ea 10 x 5" ea TA with bridges with TB around knees    2 x 10 RTB                      Prone:             - glute sets (focus on relax HS, only glutes) 10 x 5" ea NV 10 x 5" ea           - alt hip ext (glutes first, then HS) 10 x 5" ea NV            - alt UE flex 10 x 5" ea NV                         Quadruped:             - alt UE lift       10 x 5" Ea       - alt LE lift       5 x 5   ea       - opp arm/opp leg lift                          Seated on pball:             - alt marches       10 x 5" ea       - MB lift overhead       2 x 10 YMB      - MB chops and lifts       2 x 10 ea RMB                   TB:(focus on maintaining TA contraction)             - rows       2 x 10 GTB NV      - pulldowns       2 x 10 GTB NV      - thoracic antirotation       10 x 5" ea GTB NV      - elliott lumbar, thoracic rotation             - double arm chops             - double arm lifts                          Ther Ex             NuStep 8 mins, L5 NV 10 mins, L5 10 mins, L5  10 mins, L4 10 mins   L5       Spin bike    10 mins (mod intensity per pt)   10 mins (HR: 120 bpm)      Standing:             - TB hip abd (30 deg) 2 x 10 ea RTB NV  2 x 10 ea RTB          - TB hip ext 2 x 10 ea RTB NV  2 x 10 ea RTB          - lateral band walks    2 mins timed RTB         - retroband walks    2 mins timed RTB                      SL hip ER 2 x 10 ea RTB NV 1 x 10 ea RTB 2 x 10 ea RTB 2 x 10 ea RTB                                   Ther Activity             Squat retraining                          Gait Training                                       Modalities             Cryo/MH as needed

## 2022-03-08 ENCOUNTER — OFFICE VISIT (OUTPATIENT)
Dept: PHYSICAL THERAPY | Facility: CLINIC | Age: 52
End: 2022-03-08
Payer: COMMERCIAL

## 2022-03-08 DIAGNOSIS — G89.29 ACUTE EXACERBATION OF CHRONIC LOW BACK PAIN: ICD-10-CM

## 2022-03-08 DIAGNOSIS — M54.50 ACUTE EXACERBATION OF CHRONIC LOW BACK PAIN: ICD-10-CM

## 2022-03-08 DIAGNOSIS — M51.16 LUMBAR DISC HERNIATION WITH RADICULOPATHY: Primary | ICD-10-CM

## 2022-03-08 PROCEDURE — 97110 THERAPEUTIC EXERCISES: CPT | Performed by: PHYSICAL THERAPIST

## 2022-03-08 PROCEDURE — 97530 THERAPEUTIC ACTIVITIES: CPT | Performed by: PHYSICAL THERAPIST

## 2022-03-08 PROCEDURE — 97112 NEUROMUSCULAR REEDUCATION: CPT | Performed by: PHYSICAL THERAPIST

## 2022-03-10 NOTE — PROGRESS NOTES
PT Re-Evaluation     Today's date: 3/11/2022  Patient name: Julio Cesar Dickens  : 1970  MRN: [id number]  Referring provider: Teena Maldonado DO  Dx:   Encounter Diagnosis     ICD-10-CM    1  Acute exacerbation of chronic low back pain  M54 50     G89 29    2  Lumbar disc herniation with radiculopathy  M51 16                   Assessment  Assessment details: Julio Cesar Dickens is a pleasant 45 y o  presenting to physical therapy with MD referral for Acute exacerbation of chronic low back pain and Lumbar disc herniation with radiculopathy  Pt presents with increasing frequency of episodic lower back pain which began 10 years ago with most recent flare on 22  Pt presents with signs and symptoms suggestive of poor hip/core strength as well as reduced neuromuscular control with abberrent movements on return from fully flexed position  Pt states she is very fearful of reinjury and tries to consciously think prior to any motion involving flexion  Pt states significant frustration with increasing episodes of lower back pain despite consistently using a  and losing 35# over the past 1-2 years  Since time of initial evaluation, pt has made excellent improvements in squat mechanics, lumbar ROM, and lower extremity strength  Pt continues to present with reduced core strength/stability as well as reduced lower extremity strength  Although improvements have been made, this pt would continue to benefit from skilled PT services to maximize functional outcome  Problem list:  Limited lumbar ROM, decreased hip/core strength, increased muscle tension, decreased neuromuscular control, and poor squat mechanics    Treatment to include: Manual therapy techniques, lower extremity/core strengthening, neuromuscular control exercises, balance/proprioception training, instruction in a comprehensive HEP, and modalities as needed         Symptom irritability: moderateBarriers to therapy: Chronicity of symptoms, high fear levels associated with movement, depression, anxiety  Understanding of Dx/Px/POC: good   Prognosis: fair  Prognosis details: Fair due to chronicity of symptoms and high fear avoidance    Goals  ST  Pt will be able to maintain neural lumbar spine with resisted hip rotation in hooklying in 2 weeks  NOT MET  2  Pt will improve hip abduction strength to at least 4/5 in 2 weeks  PARTIALLY MET    LT  Pt will be able to transfer sit to stand with minimal to no pain in 4 weeks  PARTIALLY MET  2  Pt will be independent in a comprehensive HEP in 4 weeks  PARTIALLY MET  3  Pt will report minimal to no fear performing lumbar flexion activities in 4 weeks  NOT MET    Plan  Plan details: 1-2 x per week for 6 weeks (pt is going on vacation 1-2 x in the next 4-6 weeks)    START BACK TOOL: HIGH RISK (8-12 visits)  Lumbar Treatment Classification: stabilization  Patient would benefit from: skilled physical therapy  Frequency: 2x week  Duration in weeks: 6  Treatment plan discussed with: patient        Subjective Evaluation    History of Present Illness  Mechanism of injury: Pt reports she began to experience lower back pain shortly after having her child in   Pt states she fell after a spin class 4 weeks after childbirth  Pt states at the time, she was only offered muscle relaxors by PCP which she was unable to take due to breastfeeding  Pt states she went to a chiropractor at that time who helped her to return to walking in 5 days  Pt states since initial injury, she was experiencing pain episodes 3-4 times per year; however, over the past two years she will tweak her back 6-8 times per year typically due to flexion  Pt states her symptoms will occur on L > R  Pt sees the chiropractor to resolve her pain  Patient reports she has had some success with acupuncture in the past and present  Pt states currently, she has issues with lumbar flexion causing popping in her lower back   Pt has history of x-rays and MRI of lumbar spine where she was diagnosed with annular tears and bulging discs approximately 7 yrs ago  Pt had second opinion at [facility] as well around that time  Pt also has history of a shot in lower back which was successful   Pt states September 2020, she began to utilize a  to focus on strengthening and weight loss (35#)  Pt reports this past Tuesday, she bent forward and upon standing, she felt tension across lower back  Shortly afterwards, pt was standing with slight flexion to brush her teeth and felt another pop and then severe pain her lower back and numbness/tingling/pain down anterior thigh on L  Pt saw chiropractor 3 x last week with some improvement in motion, but no change in pain  Pt saw PCP who prescribed pt flexeril  Pt saw accupunture on Friday with improvement the following day  Pt saw chiropractor again yesterday who helped her reduce pain for the first time and resolve radicular symptoms  Aggravating factors: prolonged sitting, lumbar flexion      Premorbid status:  - ADLs: Independent with no difficulty  - Work: Full time, Full duty- desk work   - Recreation: exercise 4-5 times per week (cardio and resistance training with a )    Current status:   - ADLs/Functional activities:    - Stairs Reciprocal pattern with Pain Levels: no pain (improved)   - Sit to stand with feeling of weakness and fear of injury (no change)   - Walking unlimited   - Standing unlimited without pain (improved)   - Sitting 15 minutes prior to increase in pain (improved)   - Sleeping with 1 nightly sleep disturbances due to pain (improved)   - Bending forward to don/doff socks and shoes at a very slow pace - typically modifies due to fear of injury (same)   - Lifting groceries and laundry with fear of injury (improved)  - Work: Full time, Full duty- taking PTO days as needed  - Recreation: unable to workout due to pain    Since time of initial evaluation, functionally, pt feels as though she is back to 75% of her premorbid status  Pt states she has been able to return to 2 spin classes and one workout with her  without back pain in the past week  Pt continues to be fearful of re-injury especially with flexion motions, transfers sit to stand, and lifting  Although improvements have been made, this pt would continue to benefit from skilled PT services maximize functional outcome  Pain  Current pain ratin  At best pain ratin  At worst pain ratin  Location: left side of lower lumbar spine  Quality: tight and dull ache  Relieving factors: medications  Progression: improved    Treatments  Previous treatment: injection treatment, physical therapy, chiropractic and medication  Current treatment: chiropractic, medication and physical therapy  Patient Goals  Patient goals for therapy: decreased pain, increased motion, increased strength and independence with ADLs/IADLs          Objective     Palpation   Left   Muscle spasm in the erector spinae and quadratus lumborum  Tenderness of the erector spinae and quadratus lumborum       Active Range of Motion     Lumbar   Flexion: 110 degrees   Extension: 25 degrees   Left lateral flexion: 18 degrees       Right lateral flexion: 20 degrees     Joint Play   Joints within functional limits: L3, L4 and L5     Hypomobile: L1 and L2     Pain: L4     Strength/Myotome Testing     Left Hip   Planes of Motion   Flexion: 4+  Abduction: 4-  External rotation: 4  Internal rotation: 4    Right Hip   Planes of Motion   Flexion: 4+  Abduction: 4-  External rotation: 4+  Internal rotation: 4+    Left Knee   Flexion: 5  Extension: 5    Right Knee   Flexion: 5  Extension: 5    Left Ankle/Foot   Dorsiflexion: 5  Plantar flexion: 5    Right Ankle/Foot   Dorsiflexion: 5  Plantar flexion: 5    Additional Strength Details  SLS L: 30 seconds with contralateral hip drop  SLS R: 30 seconds with contralateral hip drop    Flexibility:  - HS: > 90 deg B  - Hip IR: minimal restriction  - Hip ER: minimal restriction  - Hip Flexion: no restriction  - Hip Extension:    Double leg Squat: squat to 70 degrees knee flexion with good form and technique    Gait: Pt ambulates over level surface with no deviations    Muscle Activation     Additional Muscle Activation Details  Lumbar stabilization:  - unable to maintain neutral lumbar spine with resisted hip rotation in hooklying bilaterally (no change)  - unable to maintain neutral lumbar spine with resisted hip extension in supine with legs extended bilaterally (no change)    IE:Pt is hamstring dominant bilaterally assessed by pt performing SLR hip extension in prone with palpation of superior aspect of glutes and proximal hamstrings  RE 1: Pt able to cocontract glutes and HS, but unable to engage gluteal muscles first, then HS with hip extension    Tests   Cervical   Negative vertical compression  Lumbar   Negative vertical compression and SIJ compression  Left   Negative quadrant and slump test      Right   Negative quadrant and slump test      Left Hip   Negative SENTHIL  Right Hip   Negative SENTHIL  Additional Tests Details  No leg length discrepancy noted      Flowsheet Rows      Most Recent Value   PT/OT G-Codes    Current Score 58   Projected Score 63            Precautions: anxiety, depression, chronicity of symptoms  HEP: Access Code:  WHLDMKXT (updated: 2-11)              Manuals 2-8 (IE) 2-11 2-15 2-18 3-1 3-4 3-8  3-11 (RE)       Left PA mobilization to sacrum         JG grade IV JG grade IV                                                                                   Neuro Re-Ed                       Supine with biofeedback:                       - PPT 10 x 10" NV       10 x 10", no biofeedback             - TA 10 x 10" NV 10 x 10" 10 x 10"                 - TA with alt march 10 x 5" ea NV 10 x 5" ea 10 x 5" ea 10 x 5" ea 10 x 5" ea             - TA with BKFO 10 x 5" ea NV 10 x 5" ea 10 x 5" ea 10 x 5" ea 10 x 5" ea                                     TA with bridges with TB around knees       2 x 10 RTB                                       Prone:                       - glute sets (focus on relax HS, only glutes) 10 x 5" ea NV 10 x 5" ea                   - alt hip ext (glutes first, then HS) 10 x 5" ea NV                     - alt UE flex 10 x 5" ea NV                                             Quadruped:                       - alt UE lift             10 x 5" Ea   10 x 5" ea       - alt LE lift             5 x 5   ea   10 x 5" ea       - opp arm/opp leg lift                                               Seated on pball:                       - alt marches             10 x 5" ea   NV       - MB lift overhead             2 x 10 YMB  NV       - MB chops and lifts             2 x 10 ea RMB  NV                               TB:(focus on maintaining TA contraction)                       - rows             2 x 10 GTB NV  NV       - pulldowns             2 x 10 GTB NV  NV       - thoracic antirotation             10 x 5" ea GTB NV  NV       - elliott lumbar, thoracic rotation                       - double arm chops                       - double arm lifts                        CKC GM               NV       Ther Ex                       NuStep 8 mins, L5 NV 10 mins, L5 10 mins, L5   10 mins, L4 10 mins   L5           Spin bike       10 mins (mod intensity per pt)     10 mins (HR: 120 bpm)  10 mins       Standing:                       - TB hip abd (30 deg) 2 x 10 ea RTB NV   2 x 10 ea RTB                 - TB hip ext 2 x 10 ea RTB NV   2 x 10 ea RTB                 - lateral band walks       2 mins timed RTB        NV       - retroband walks       2 mins timed RTB        NV        - standing B hip ER (TB around balls of feet)                NV       SL hip ER 2 x 10 ea RTB NV 1 x 10 ea RTB 2 x 10 ea RTB 2 x 10 ea RTB                                                               Ther Activity                       Squat retraining                                               Gait Training                                                                       Modalities                       Cryo/MH as needed

## 2022-03-11 ENCOUNTER — EVALUATION (OUTPATIENT)
Dept: PHYSICAL THERAPY | Facility: CLINIC | Age: 52
End: 2022-03-11
Payer: COMMERCIAL

## 2022-03-11 DIAGNOSIS — G89.29 ACUTE EXACERBATION OF CHRONIC LOW BACK PAIN: Primary | ICD-10-CM

## 2022-03-11 DIAGNOSIS — M54.50 ACUTE EXACERBATION OF CHRONIC LOW BACK PAIN: Primary | ICD-10-CM

## 2022-03-11 DIAGNOSIS — M51.16 LUMBAR DISC HERNIATION WITH RADICULOPATHY: ICD-10-CM

## 2022-03-11 PROCEDURE — 97110 THERAPEUTIC EXERCISES: CPT | Performed by: PHYSICAL THERAPIST

## 2022-03-11 PROCEDURE — 97112 NEUROMUSCULAR REEDUCATION: CPT | Performed by: PHYSICAL THERAPIST

## 2022-03-14 NOTE — PROGRESS NOTES
Daily Note     Today's date: 3/15/2022  Patient name: Evelin Uriostegui  : 1970  MRN: [id number]  Referring provider: Мария Suarez DO  Dx:   Encounter Diagnosis     ICD-10-CM    1  Lumbar disc herniation with radiculopathy  M51 16    2  Acute exacerbation of chronic low back pain  M54 50     G89 29                   Subjective: Pt reports her back is feeling good today  Pt states she will be on vacation and unable to attend PT until   Objective: See treatment diary below      Assessment: Progressed exercises this session as charted to enhance neuromuscular control in a more functional position  Pt was able to tolerate all exercises this date without increase in pain/discomfort  Pt required minimal verbal cues to maintain neutral lumbar spine during strengthening exercises  Pt was very challenged by CK gluteus medius strengthening exercise this date  Tolerated treatment well  Patient demonstrated fatigue post treatment, exhibited good technique with therapeutic exercises and would benefit from continued PT      Plan: Progress treatment as tolerated  Precautions: anxiety, depression, chronicity of symptoms  HEP: Access Code:  WHLDMKXT (updated: )              Manuals 2-8 (IE) 2-11 2-15 2-18 3-1 3-4 3-8  3-11 (RE)  3-15     Left PA mobilization to sacrum         JG grade IV JG grade IV                                                                                   Neuro Re-Ed                       Supine with biofeedback:                       - PPT 10 x 10" NV       10 x 10", no biofeedback             - TA 10 x 10" NV 10 x 10" 10 x 10"                 - TA with alt march 10 x 5" ea NV 10 x 5" ea 10 x 5" ea 10 x 5" ea 10 x 5" ea             - TA with BKFO 10 x 5" ea NV 10 x 5" ea 10 x 5" ea 10 x 5" ea 10 x 5" ea                                     TA with bridges with TB around knees       2 x 10 RTB                                       Prone:                       - glute sets (focus on relax HS, only glutes) 10 x 5" ea NV 10 x 5" ea                   - alt hip ext (glutes first, then HS) 10 x 5" ea NV                     - alt UE flex 10 x 5" ea NV                                             Quadruped:                       - alt UE lift             10 x 5" Ea   10 x 5" ea  10 x 5" ea     - alt LE lift             5 x 5   ea   10 x 5" ea  10 x 5" ea     - opp arm/opp leg lift                                               Seated on pball:                       - alt marches             10 x 5" ea   NV NV     - MB lift overhead             2 x 10 YMB  NV  NV     - MB chops and lifts             2 x 10 ea RMB  NV  NV                             TB:(focus on maintaining TA contraction)                       - rows             2 x 10 GTB NV  NV  2 x 10 GTB     - pulldowns             2 x 10 GTB NV  NV  2 x 10 GTB     - thoracic antirotation             10 x 5" ea GTB NV  NV  10 x 5" ea GTB     - elliott lumbar, thoracic rotation                       - double arm chops                       - double arm lifts                        CKC GM               NV  5 x ea      Ther Ex                       NuStep 8 mins, L5 NV 10 mins, L5 10 mins, L5   10 mins, L4 10 mins   L5      10 mins, L5     Spin bike       10 mins (mod intensity per pt)     10 mins (HR: 120 bpm)  10 mins       Standing:                       - TB hip abd (30 deg) 2 x 10 ea RTB NV   2 x 10 ea RTB                 - TB hip ext 2 x 10 ea RTB NV   2 x 10 ea RTB                 - lateral band walks       2 mins timed RTB        NV  2 mins timed RTB     - retroband walks       2 mins timed RTB        NV  2 mins timed RTB      - standing B hip ER (TB around balls of feet)                NV  2 x 10 RTB     SL hip ER 2 x 10 ea RTB NV 1 x 10 ea RTB 2 x 10 ea RTB 2 x 10 ea RTB                                                               Ther Activity                       Squat retraining                                               Gait Training                                                                       Modalities                       Cryo/MH as needed

## 2022-03-15 ENCOUNTER — OFFICE VISIT (OUTPATIENT)
Dept: PHYSICAL THERAPY | Facility: CLINIC | Age: 52
End: 2022-03-15
Payer: COMMERCIAL

## 2022-03-15 DIAGNOSIS — M51.16 LUMBAR DISC HERNIATION WITH RADICULOPATHY: Primary | ICD-10-CM

## 2022-03-15 DIAGNOSIS — M54.50 ACUTE EXACERBATION OF CHRONIC LOW BACK PAIN: ICD-10-CM

## 2022-03-15 DIAGNOSIS — G89.29 ACUTE EXACERBATION OF CHRONIC LOW BACK PAIN: ICD-10-CM

## 2022-03-15 PROCEDURE — 97112 NEUROMUSCULAR REEDUCATION: CPT | Performed by: PHYSICAL THERAPIST

## 2022-03-15 PROCEDURE — 97110 THERAPEUTIC EXERCISES: CPT | Performed by: PHYSICAL THERAPIST

## 2022-03-18 ENCOUNTER — APPOINTMENT (OUTPATIENT)
Dept: PHYSICAL THERAPY | Facility: CLINIC | Age: 52
End: 2022-03-18
Payer: COMMERCIAL

## 2022-03-29 ENCOUNTER — APPOINTMENT (OUTPATIENT)
Dept: PHYSICAL THERAPY | Facility: CLINIC | Age: 52
End: 2022-03-29
Payer: COMMERCIAL

## 2022-04-01 ENCOUNTER — APPOINTMENT (OUTPATIENT)
Dept: PHYSICAL THERAPY | Facility: CLINIC | Age: 52
End: 2022-04-01
Payer: COMMERCIAL

## 2022-04-04 NOTE — PROGRESS NOTES
Daily Note     Today's date: 2022  Patient name: Leigh Darnell  : 1970  MRN: [id number]  Referring provider: Treasure Boyce DO  Dx: No diagnosis found  Subjective: ***      Objective: See treatment diary below      Assessment: Tolerated treatment {Tolerated treatment :}  Patient {assessment:[id number]}      Plan: {PLAN:}     Precautions: anxiety, depression, chronicity of symptoms  HEP: Access Code:  WHLDMKXT (updated: )              Manuals 2-8 (IE) 2-11 2-15 2-18 3-1 3-4 3-8  3-11 (RE)  3-15     Left PA mobilization to sacrum         JG grade IV JG grade IV                                                                                   Neuro Re-Ed                       Supine with biofeedback:                       - PPT 10 x 10" NV       10 x 10", no biofeedback             - TA 10 x 10" NV 10 x 10" 10 x 10"                 - TA with alt march 10 x 5" ea NV 10 x 5" ea 10 x 5" ea 10 x 5" ea 10 x 5" ea             - TA with BKFO 10 x 5" ea NV 10 x 5" ea 10 x 5" ea 10 x 5" ea 10 x 5" ea                                     TA with bridges with TB around knees       2 x 10 RTB                                       Prone:                       - glute sets (focus on relax HS, only glutes) 10 x 5" ea NV 10 x 5" ea                   - alt hip ext (glutes first, then HS) 10 x 5" ea NV                     - alt UE flex 10 x 5" ea NV                                             Quadruped:                       - alt UE lift             10 x 5" Ea   10 x 5" ea  10 x 5" ea     - alt LE lift             5 x 5   ea   10 x 5" ea  10 x 5" ea     - opp arm/opp leg lift                                               Seated on pball:                       - alt marches             10 x 5" ea   NV NV     - MB lift overhead             2 x 10 YMB  NV  NV     - MB chops and lifts             2 x 10 ea RMB  NV  NV                             TB:(focus on maintaining TA contraction)                       - rows             2 x 10 GTB NV  NV  2 x 10 GTB     - pulldowns             2 x 10 GTB NV  NV  2 x 10 GTB     - thoracic antirotation             10 x 5" ea GTB NV  NV  10 x 5" ea GTB     - ellitot lumbar, thoracic rotation                       - double arm chops                       - double arm lifts                        CKC GM               NV  5 x ea      Ther Ex                       NuStep 8 mins, L5 NV 10 mins, L5 10 mins, L5   10 mins, L4 10 mins   L5      10 mins, L5     Spin bike       10 mins (mod intensity per pt)     10 mins (HR: 120 bpm)  10 mins       Standing:                       - TB hip abd (30 deg) 2 x 10 ea RTB NV   2 x 10 ea RTB                 - TB hip ext 2 x 10 ea RTB NV   2 x 10 ea RTB                 - lateral band walks       2 mins timed RTB        NV  2 mins timed RTB     - retroband walks       2 mins timed RTB        NV  2 mins timed RTB      - standing B hip ER (TB around balls of feet)                NV  2 x 10 RTB     SL hip ER 2 x 10 ea RTB NV 1 x 10 ea RTB 2 x 10 ea RTB 2 x 10 ea RTB                                                               Ther Activity                       Squat retraining                                               Gait Training                                                                       Modalities                       Cryo/MH as needed

## 2022-04-05 ENCOUNTER — APPOINTMENT (OUTPATIENT)
Dept: PHYSICAL THERAPY | Facility: CLINIC | Age: 52
End: 2022-04-05
Payer: COMMERCIAL

## 2022-04-07 ENCOUNTER — APPOINTMENT (OUTPATIENT)
Dept: PHYSICAL THERAPY | Facility: CLINIC | Age: 52
End: 2022-04-07
Payer: COMMERCIAL

## 2022-04-11 NOTE — PROGRESS NOTES
PT Re-Evaluation     Today's date: 2022  Patient name: Phillip Leiva  : 1970  MRN: [id number]  Referring provider: Linda Stout DO  Dx:   No diagnosis found  Assessment  Assessment details: Phillip Leiva is a pleasant 45 y o  presenting to physical therapy with MD referral for Acute exacerbation of chronic low back pain and Lumbar disc herniation with radiculopathy  Pt presents with increasing frequency of episodic lower back pain which began 10 years ago with most recent flare on 22  Pt presents with signs and symptoms suggestive of poor hip/core strength as well as reduced neuromuscular control with abberrent movements on return from fully flexed position  Pt states she is very fearful of reinjury and tries to consciously think prior to any motion involving flexion  Pt states significant frustration with increasing episodes of lower back pain despite consistently using a  and losing 35# over the past 1-2 years  Since time of initial evaluation, pt has made excellent improvements in squat mechanics, lumbar ROM, and lower extremity strength  Pt continues to present with reduced core strength/stability as well as reduced lower extremity strength  Although improvements have been made, this pt would continue to benefit from skilled PT services to maximize functional outcome  Problem list:  Limited lumbar ROM, decreased hip/core strength, increased muscle tension, decreased neuromuscular control, and poor squat mechanics    Treatment to include: Manual therapy techniques, lower extremity/core strengthening, neuromuscular control exercises, balance/proprioception training, instruction in a comprehensive HEP, and modalities as needed         Symptom irritability: moderateBarriers to therapy: Chronicity of symptoms, high fear levels associated with movement, depression, anxiety  Understanding of Dx/Px/POC: good   Prognosis: fair  Prognosis details: Fair due to chronicity of symptoms and high fear avoidance    Goals  ST  Pt will be able to maintain neural lumbar spine with resisted hip rotation in hooklying in 2 weeks  NOT MET  2  Pt will improve hip abduction strength to at least 4/5 in 2 weeks  PARTIALLY MET    LT  Pt will be able to transfer sit to stand with minimal to no pain in 4 weeks  PARTIALLY MET  2  Pt will be independent in a comprehensive HEP in 4 weeks  PARTIALLY MET  3  Pt will report minimal to no fear performing lumbar flexion activities in 4 weeks  NOT MET    Plan  Plan details: 1-2 x per week for 6 weeks (pt is going on vacation 1-2 x in the next 4-6 weeks)    START BACK TOOL: HIGH RISK (8-12 visits)  Lumbar Treatment Classification: stabilization  Patient would benefit from: skilled physical therapy  Frequency: 2x week  Duration in weeks: 6  Treatment plan discussed with: patient        Subjective Evaluation    History of Present Illness  Mechanism of injury: Pt reports she began to experience lower back pain shortly after having her child in   Pt states she fell after a spin class 4 weeks after childbirth  Pt states at the time, she was only offered muscle relaxors by PCP which she was unable to take due to breastfeeding  Pt states she went to a chiropractor at that time who helped her to return to walking in 5 days  Pt states since initial injury, she was experiencing pain episodes 3-4 times per year; however, over the past two years she will tweak her back 6-8 times per year typically due to flexion  Pt states her symptoms will occur on L > R  Pt sees the chiropractor to resolve her pain  Patient reports she has had some success with acupuncture in the past and present  Pt states currently, she has issues with lumbar flexion causing popping in her lower back  Pt has history of x-rays and MRI of lumbar spine where she was diagnosed with annular tears and bulging discs approximately 7 yrs ago   Pt had second opinion at [facility] as well around that time  Pt also has history of a shot in lower back which was successful   Pt states September 2020, she began to utilize a  to focus on strengthening and weight loss (35#)  Pt reports this past Tuesday, she bent forward and upon standing, she felt tension across lower back  Shortly afterwards, pt was standing with slight flexion to brush her teeth and felt another pop and then severe pain her lower back and numbness/tingling/pain down anterior thigh on L  Pt saw chiropractor 3 x last week with some improvement in motion, but no change in pain  Pt saw PCP who prescribed pt flexeril  Pt saw accupunture on Friday with improvement the following day  Pt saw chiropractor again yesterday who helped her reduce pain for the first time and resolve radicular symptoms  Aggravating factors: prolonged sitting, lumbar flexion  Premorbid status:  - ADLs: Independent with no difficulty  - Work: Full time, Full duty- desk work   - Recreation: exercise 4-5 times per week (cardio and resistance training with a )    Current status:   - ADLs/Functional activities:    - Stairs Reciprocal pattern with Pain Levels: no pain (improved)   - Sit to stand with feeling of weakness and fear of injury (no change)   - Walking unlimited   - Standing unlimited without pain (improved)   - Sitting 15 minutes prior to increase in pain (improved)   - Sleeping with 1 nightly sleep disturbances due to pain (improved)   - Bending forward to don/doff socks and shoes at a very slow pace - typically modifies due to fear of injury (same)   - Lifting groceries and laundry with fear of injury (improved)  - Work: Full time, Full duty- taking PTO days as needed  - Recreation: unable to workout due to pain    Since time of initial evaluation, functionally, pt feels as though she is back to 75% of her premorbid status   Pt states she has been able to return to 2 spin classes and one workout with her  without back pain in the past week  Pt continues to be fearful of re-injury especially with flexion motions, transfers sit to stand, and lifting  Although improvements have been made, this pt would continue to benefit from skilled PT services maximize functional outcome  Pain  Current pain ratin  At best pain ratin  At worst pain ratin  Location: left side of lower lumbar spine  Quality: tight and dull ache  Relieving factors: medications  Progression: improved    Treatments  Previous treatment: injection treatment, physical therapy, chiropractic and medication  Current treatment: chiropractic, medication and physical therapy  Patient Goals  Patient goals for therapy: decreased pain, increased motion, increased strength and independence with ADLs/IADLs          Objective     Palpation   Left   Muscle spasm in the erector spinae and quadratus lumborum  Tenderness of the erector spinae and quadratus lumborum       Active Range of Motion     Lumbar   Flexion: 110 degrees   Extension: 25 degrees   Left lateral flexion: 18 degrees       Right lateral flexion: 20 degrees     Joint Play   Joints within functional limits: L3, L4 and L5     Hypomobile: L1 and L2     Pain: L4     Strength/Myotome Testing     Left Hip   Planes of Motion   Flexion: 4+  Abduction: 4-  External rotation: 4  Internal rotation: 4    Right Hip   Planes of Motion   Flexion: 4+  Abduction: 4-  External rotation: 4+  Internal rotation: 4+    Left Knee   Flexion: 5  Extension: 5    Right Knee   Flexion: 5  Extension: 5    Left Ankle/Foot   Dorsiflexion: 5  Plantar flexion: 5    Right Ankle/Foot   Dorsiflexion: 5  Plantar flexion: 5    Additional Strength Details  SLS L: 30 seconds with contralateral hip drop  SLS R: 30 seconds with contralateral hip drop    Flexibility:  - HS: > 90 deg B  - Hip IR: minimal restriction  - Hip ER: minimal restriction  - Hip Flexion: no restriction  - Hip Extension:    Double leg Squat: squat to 70 degrees knee flexion with good form and technique    Gait: Pt ambulates over level surface with no deviations    Muscle Activation     Additional Muscle Activation Details  Lumbar stabilization:  - unable to maintain neutral lumbar spine with resisted hip rotation in hooklying bilaterally (no change)  - unable to maintain neutral lumbar spine with resisted hip extension in supine with legs extended bilaterally (no change)    IE:Pt is hamstring dominant bilaterally assessed by pt performing SLR hip extension in prone with palpation of superior aspect of glutes and proximal hamstrings  RE 1: Pt able to cocontract glutes and HS, but unable to engage gluteal muscles first, then HS with hip extension    Tests   Cervical   Negative vertical compression  Lumbar   Negative vertical compression and SIJ compression  Left   Negative quadrant and slump test      Right   Negative quadrant and slump test      Left Hip   Negative SENTHIL  Right Hip   Negative SENTHIL  Additional Tests Details  No leg length discrepancy noted            Precautions: anxiety, depression, chronicity of symptoms  HEP: Access Code:  [email or web address] (updated: 2-11)              Manuals 2-8 (IE) 2-11 2-15 2-18 3-1 3-4 3-8  3-11 (RE)  3-15     Left PA mobilization to sacrum         JG grade IV JG grade IV                                                                                   Neuro Re-Ed                       Supine with biofeedback:                       - PPT 10 x 10" NV       10 x 10", no biofeedback             - TA 10 x 10" NV 10 x 10" 10 x 10"                 - TA with alt march 10 x 5" ea NV 10 x 5" ea 10 x 5" ea 10 x 5" ea 10 x 5" ea             - TA with BKFO 10 x 5" ea NV 10 x 5" ea 10 x 5" ea 10 x 5" ea 10 x 5" ea                                     TA with bridges with TB around knees       2 x 10 RTB                                       Prone:                       - glute sets (focus on relax HS, only glutes) 10 x 5" ea NV 10 x 5" ea                   - alt hip ext (glutes first, then HS) 10 x 5" ea NV                     - alt UE flex 10 x 5" ea NV                                             Quadruped:                       - alt UE lift             10 x 5" Ea   10 x 5" ea  10 x 5" ea     - alt LE lift             5 x 5   ea   10 x 5" ea  10 x 5" ea     - opp arm/opp leg lift                                               Seated on pball:                       - alt marches             10 x 5" ea   NV NV     - MB lift overhead             2 x 10 YMB  NV  NV     - MB chops and lifts             2 x 10 ea RMB  NV  NV                             TB:(focus on maintaining TA contraction)                       - rows             2 x 10 GTB NV  NV  2 x 10 GTB     - pulldowns             2 x 10 GTB NV  NV  2 x 10 GTB     - thoracic antirotation             10 x 5" ea GTB NV  NV  10 x 5" ea GTB     - elliott lumbar, thoracic rotation                       - double arm chops                       - double arm lifts                        CKC GM               NV  5 x ea      Ther Ex                       NuStep 8 mins, L5 NV 10 mins, L5 10 mins, L5   10 mins, L4 10 mins   L5      10 mins, L5     Spin bike       10 mins (mod intensity per pt)     10 mins (HR: 120 bpm)  10 mins       Standing:                       - TB hip abd (30 deg) 2 x 10 ea RTB NV   2 x 10 ea RTB                 - TB hip ext 2 x 10 ea RTB NV   2 x 10 ea RTB                 - lateral band walks       2 mins timed RTB        NV  2 mins timed RTB     - retroband walks       2 mins timed RTB        NV  2 mins timed RTB      - standing B hip ER (TB around balls of feet)                NV  2 x 10 RTB     SL hip ER 2 x 10 ea RTB NV 1 x 10 ea RTB 2 x 10 ea RTB 2 x 10 ea RTB                                                               Ther Activity                       Squat retraining                                               Gait Training                                                                       Modalities                       Cryo/MH as needed

## 2022-04-12 ENCOUNTER — APPOINTMENT (OUTPATIENT)
Dept: PHYSICAL THERAPY | Facility: CLINIC | Age: 52
End: 2022-04-12
Payer: COMMERCIAL

## 2022-04-12 ENCOUNTER — RA CDI HCC (OUTPATIENT)
Dept: OTHER | Facility: HOSPITAL | Age: 52
End: 2022-04-12

## 2022-04-12 NOTE — PROGRESS NOTES
[facility]  coding opportunities          Chart Reviewed number of suggestions sent to Provider: 1     Patients Insurance        Commercial Insurance: [id number]     Based on clinical documentation indicated in your record, it appears that the patient may have the following conditions:    F32 4 Single episode, major depressive disorder, in partial remission    F32 5 Single episode, major depressive disorder, in full remission     If this is correct, please document and assess at your next visit, April 21

## 2022-04-14 ENCOUNTER — APPOINTMENT (OUTPATIENT)
Dept: PHYSICAL THERAPY | Facility: CLINIC | Age: 52
End: 2022-04-14
Payer: COMMERCIAL

## 2022-04-19 ENCOUNTER — EVALUATION (OUTPATIENT)
Dept: PHYSICAL THERAPY | Facility: CLINIC | Age: 52
End: 2022-04-19
Payer: COMMERCIAL

## 2022-04-19 DIAGNOSIS — G89.29 ACUTE EXACERBATION OF CHRONIC LOW BACK PAIN: ICD-10-CM

## 2022-04-19 DIAGNOSIS — M54.50 ACUTE EXACERBATION OF CHRONIC LOW BACK PAIN: ICD-10-CM

## 2022-04-19 DIAGNOSIS — M51.16 LUMBAR DISC HERNIATION WITH RADICULOPATHY: Primary | ICD-10-CM

## 2022-04-19 PROCEDURE — 97110 THERAPEUTIC EXERCISES: CPT | Performed by: PHYSICAL THERAPIST

## 2022-04-19 NOTE — PROGRESS NOTES
PT Re-Evaluation  and PT Discharge    Today's date: 2022  Patient name: Heidi Henson  : 1970  MRN: [id number]  Referring provider: Sameera Nam DO  Dx:   Encounter Diagnosis     ICD-10-CM    1  Lumbar disc herniation with radiculopathy  M51 16    2  Acute exacerbation of chronic low back pain  M54 50     G89 29                   Assessment  Assessment details: Heidi Henson is a pleasant 45 y o  presenting to physical therapy with MD referral for Acute exacerbation of chronic low back pain and Lumbar disc herniation with radiculopathy  Pt presents with increasing frequency of episodic lower back pain which began 10 years ago with most recent flare on 22  Pt presents with signs and symptoms suggestive of poor hip/core strength as well as reduced neuromuscular control with abberrent movements on return from fully flexed position  Pt states she is very fearful of reinjury and tries to consciously think prior to any motion involving flexion  Pt states significant frustration with increasing episodes of lower back pain despite consistently using a  and losing 35# over the past 1-2 years  Since time of previous re-evaluation, pt has made good improvements in lower extremity strength and core neuromuscular control and stability  At this point in time, pt no longer requires skilled PT services and will be discharged to an independent HEP as well as a   Symptom irritability: moderateBarriers to therapy: Chronicity of symptoms, high fear levels associated with movement, depression, anxiety  Understanding of Dx/Px/POC: good   Prognosis: fair  Prognosis details: Fair due to chronicity of symptoms and high fear avoidance    Goals  ST  Pt will be able to maintain neural lumbar spine with resisted hip rotation in hooklying in 2 weeks  MET  2  Pt will improve hip abduction strength to at least 4/5 in 2 weeks  MET    LT   Pt will be able to transfer sit to stand with minimal to no pain in 4 weeks  MET  2  Pt will be independent in a comprehensive HEP in 4 weeks  MET  3  Pt will report minimal to no fear performing lumbar flexion activities in 4 weeks  PARTIALLY MET    Plan  Plan details: START BACK TOOL: HIGH RISK (8-12 visits)  Lumbar Treatment Classification: stabilization  Treatment plan discussed with: patient        Subjective Evaluation    History of Present Illness  Mechanism of injury: Pt reports she began to experience lower back pain shortly after having her child in 2012  Pt states she fell after a spin class 4 weeks after childbirth  Pt states at the time, she was only offered muscle relaxors by PCP which she was unable to take due to breastfeeding  Pt states she went to a chiropractor at that time who helped her to return to walking in 5 days  Pt states since initial injury, she was experiencing pain episodes 3-4 times per year; however, over the past two years she will tweak her back 6-8 times per year typically due to flexion  Pt states her symptoms will occur on L > R  Pt sees the chiropractor to resolve her pain  Patient reports she has had some success with acupuncture in the past and present  Pt states currently, she has issues with lumbar flexion causing popping in her lower back  Pt has history of x-rays and MRI of lumbar spine where she was diagnosed with annular tears and bulging discs approximately 7 yrs ago  Pt had second opinion at [facility] as well around that time  Pt also has history of a shot in lower back which was successful   Pt states September 2020, she began to utilize a  to focus on strengthening and weight loss (35#)  Pt reports this past Tuesday, she bent forward and upon standing, she felt tension across lower back  Shortly afterwards, pt was standing with slight flexion to brush her teeth and felt another pop and then severe pain her lower back and numbness/tingling/pain down anterior thigh on L   Pt saw chiropractor 3 x last week with some improvement in motion, but no change in pain  Pt saw PCP who prescribed pt flexeril  Pt saw accupunture on Friday with improvement the following day  Pt saw chiropractor again yesterday who helped her reduce pain for the first time and resolve radicular symptoms  Aggravating factors: prolonged sitting, lumbar flexion  Premorbid status:  - ADLs: Independent with no difficulty  - Work: Full time, Full duty- desk work   - Recreation: exercise 4-5 times per week (cardio and resistance training with a )    Current status:   - ADLs/Functional activities:    - Stairs Reciprocal pattern with Pain Levels: no pain    - Sit to stand with no feeling of weakness and fear of injury (improved)   - Walking unlimited   - Standing unlimited without pain    - Sitting 30 minutes prior to increase in pain (improved)   - Sleeping with 0 nightly sleep disturbances due to pain (improved)   - Bending forward to don/doff socks and shoes at a very slow pace (same)   - Lifting groceries and laundry with no pain, but fear of injury (same)  - Work: Full time, Full duty- taking PTO days as needed  - Recreation: unable to workout due to pain    Since time of initial evaluation, functionally, pt feels as though she is back to 75% of her premorbid status  Pt states she has been able to return to 2 spin classes and one workout with her  without back pain in the past week  Pt continues to be fearful of re-injury especially with flexion motions, transfers sit to stand, and lifting  Although improvements have been made, this pt would continue to benefit from skilled PT services maximize functional outcome  Since time of previous re-evaluation, functionally, pt feels as though she is back to 80% of her premorbid status  Pt reports remaining 20% is due to continued fear of re-injury  Pt reports she has not been compliant with outlined HEP due to busy schedule   At this point in time, pt no longer requires skilled PT services and will be discharged to an independent [facility]  Pain  Current pain ratin  At best pain ratin  At worst pain ratin  Location: left side of lower lumbar spine  Quality: tight and dull ache  Relieving factors: medications  Progression: improved    Treatments  Previous treatment: injection treatment, physical therapy, chiropractic and medication  Current treatment: chiropractic, medication and physical therapy  Patient Goals  Patient goals for therapy: decreased pain, increased motion, increased strength and independence with ADLs/IADLs          Objective     Palpation   Left   Muscle spasm in the erector spinae and quadratus lumborum  Tenderness of the erector spinae and quadratus lumborum       Active Range of Motion     Lumbar   Flexion: 110 degrees   Extension: 25 degrees   Left lateral flexion: 18 degrees       Right lateral flexion: 20 degrees     Joint Play   Joints within functional limits: L3, L4 and L5     Hypomobile: L1 and L2     Pain: L4     Strength/Myotome Testing     Left Hip   Planes of Motion   Flexion: 4+  Abduction: 4  External rotation: 4  Internal rotation: 4    Right Hip   Planes of Motion   Flexion: 4+  Abduction: 4+  External rotation: 4+  Internal rotation: 4+    Left Knee   Flexion: 5  Extension: 5    Right Knee   Flexion: 5  Extension: 5    Left Ankle/Foot   Dorsiflexion: 5  Plantar flexion: 5    Right Ankle/Foot   Dorsiflexion: 5  Plantar flexion: 5    Additional Strength Details  SLS L: 30 seconds with contralateral hip drop  SLS R: 30 seconds with contralateral hip drop    Flexibility:  - HS: > 90 deg B  - Hip IR: minimal restriction  - Hip ER: minimal restriction  - Hip Flexion: no restriction    Double leg Squat: squat to 70 degrees knee flexion with good form and technique    Gait: Pt ambulates over level surface with no deviations    Muscle Activation     Additional Muscle Activation Details  Lumbar stabilization:  - Able to maintain neutral lumbar spine with resisted hip rotation in hooklying bilaterally (improved)  - unable to maintain neutral lumbar spine with resisted hip extension in supine with legs extended bilaterally (no change)    IE:Pt is hamstring dominant bilaterally assessed by pt performing SLR hip extension in prone with palpation of superior aspect of glutes and proximal hamstrings  RE 1: Pt able to cocontract glutes and HS, but unable to engage gluteal muscles first, then HS with hip extension    Tests   Cervical   Negative vertical compression  Lumbar   Negative vertical compression and SIJ compression  Left   Negative quadrant and slump test      Right   Negative quadrant and slump test      Left Hip   Negative SENTHIL  Right Hip   Negative SENTHIL  Additional Tests Details  No leg length discrepancy noted            Precautions: anxiety, depression, chronicity of symptoms  HEP: Access Code:  [email or web address] (updated: 4-19)              Manuals 2-8 (IE) 2-11 2-15 2-18 3-1 3-4 3-8  3-11 (RE)  3-15  4-19 (RE)   Left PA mobilization to sacrum         JG grade IV JG grade IV                                                                                   Neuro Re-Ed                       Supine with biofeedback:                       - PPT 10 x 10" NV       10 x 10", no biofeedback             - TA 10 x 10" NV 10 x 10" 10 x 10"                 - TA with alt march 10 x 5" ea NV 10 x 5" ea 10 x 5" ea 10 x 5" ea 10 x 5" ea             - TA with BKFO 10 x 5" ea NV 10 x 5" ea 10 x 5" ea 10 x 5" ea 10 x 5" ea                                     TA with bridges with TB around knees       2 x 10 RTB                                       Prone:                       - glute sets (focus on relax HS, only glutes) 10 x 5" ea NV 10 x 5" ea                   - alt hip ext (glutes first, then HS) 10 x 5" ea NV                     - alt UE flex 10 x 5" ea NV                                             Quadruped:                       - alt UE lift             10 x 5" Ea   10 x 5" ea  10 x 5" ea     - alt LE lift             5 x 5   ea   10 x 5" ea  10 x 5" ea     - opp arm/opp leg lift                                               Seated on pball:                       - alt marches             10 x 5" ea   NV NV     - MB lift overhead             2 x 10 YMB  NV  NV     - MB chops and lifts             2 x 10 ea RMB  NV  NV                             TB:(focus on maintaining TA contraction)                       - rows             2 x 10 GTB NV  NV  2 x 10 GTB     - pulldowns             2 x 10 GTB NV  NV  2 x 10 GTB     - thoracic antirotation             10 x 5" ea GTB NV  NV  10 x 5" ea GTB     - elliott lumbar, thoracic rotation                       - double arm chops                       - double arm lifts                        CKC GM               NV  5 x ea      Ther Ex                       NuStep 8 mins, L5 NV 10 mins, L5 10 mins, L5   10 mins, L4 10 mins   L5      10 mins, L5     Spin bike       10 mins (mod intensity per pt)     10 mins (HR: 120 bpm)  10 mins    10 mins   Standing:                       - TB hip abd (30 deg) 2 x 10 ea RTB NV   2 x 10 ea RTB                 - TB hip ext 2 x 10 ea RTB NV   2 x 10 ea RTB                 - lateral band walks       2 mins timed RTB        NV  2 mins timed RTB     - retroband walks       2 mins timed RTB        NV  2 mins timed RTB      - standing B hip ER (TB around balls of feet)                NV  2 x 10 RTB     SL hip ER 2 x 10 ea RTB NV 1 x 10 ea RTB 2 x 10 ea RTB 2 x 10 ea RTB                                                               Ther Activity                       Squat retraining                                               Gait Training                                                                       Modalities                       Cryo/MH as needed

## 2022-04-21 ENCOUNTER — APPOINTMENT (OUTPATIENT)
Dept: PHYSICAL THERAPY | Facility: CLINIC | Age: 52
End: 2022-04-21
Payer: COMMERCIAL

## 2022-04-26 ENCOUNTER — APPOINTMENT (OUTPATIENT)
Dept: PHYSICAL THERAPY | Facility: CLINIC | Age: 52
End: 2022-04-26
Payer: COMMERCIAL

## 2022-04-28 ENCOUNTER — APPOINTMENT (OUTPATIENT)
Dept: PHYSICAL THERAPY | Facility: CLINIC | Age: 52
End: 2022-04-28
Payer: COMMERCIAL

## 2022-06-14 ENCOUNTER — TELEMEDICINE (OUTPATIENT)
Dept: INTERNAL MEDICINE CLINIC | Age: 52
End: 2022-06-14
Payer: COMMERCIAL

## 2022-06-14 VITALS — HEIGHT: 64 IN | BODY MASS INDEX: 24.75 KG/M2 | WEIGHT: 145 LBS | TEMPERATURE: 97.7 F

## 2022-06-14 DIAGNOSIS — J01.01 ACUTE RECURRENT MAXILLARY SINUSITIS: Primary | ICD-10-CM

## 2022-06-14 DIAGNOSIS — J06.9 UPPER RESPIRATORY TRACT INFECTION, UNSPECIFIED TYPE: ICD-10-CM

## 2022-06-14 PROCEDURE — 99213 OFFICE O/P EST LOW 20 MIN: CPT | Performed by: STUDENT IN AN ORGANIZED HEALTH CARE EDUCATION/TRAINING PROGRAM

## 2022-06-14 RX ORDER — AZITHROMYCIN 250 MG/1
TABLET, FILM COATED ORAL
Qty: 6 TABLET | Refills: 0 | Status: SHIPPED | OUTPATIENT
Start: 2022-06-14 | End: 2022-06-19

## 2022-06-14 NOTE — ASSESSMENT & PLAN NOTE
Onset of symptoms 5 days ago  Symptoms include cough, nasal and sinus congestion, postnasal drip and nasal discharge  Nasal discharge has changed to greenish/yellowish color  No fevers or chills  No headaches  She has tenderness over the maxillary sinus  No shortness of breath or chest pain  No sick contacts  No GI or  symptoms  Symptoms were preceded by allergy symptoms of sneezing and itchiness for 1 week  Home COVID test negative  He has history of sinusitis  Will send Z-Bryce to the patient's pharmacy  Advised patient continue supportive care with Tylenol, Flonase as needed  Patient advised to call if symptoms did not improve or worsen

## 2022-06-14 NOTE — PROGRESS NOTES
Virtual Regular Visit    Verification of patient location:    Patient is located in the following state in which I hold an active license [place]      Assessment/Plan:    Problem List Items Addressed This Visit        Respiratory    Upper respiratory tract infection     Onset of symptoms 5 days ago  Symptoms include cough, nasal and sinus congestion, postnasal drip and nasal discharge  Nasal discharge has changed to greenish/yellowish color  No fevers or chills  No headaches  She has tenderness over the maxillary sinus  No shortness of breath or chest pain  No sick contacts  No GI or  symptoms  Symptoms were preceded by allergy symptoms of sneezing and itchiness for 1 week  Home COVID test negative  He has history of sinusitis  Will send Z-Bryce to the patient's pharmacy  Advised patient continue supportive care with Tylenol, Flonase as needed  Patient advised to call if symptoms did not improve or worsen  Relevant Medications    azithromycin (Zithromax) 250 mg tablet    Acute recurrent maxillary sinusitis - Primary    Relevant Medications    azithromycin (Zithromax) 250 mg tablet               Reason for visit is   Chief Complaint   Patient presents with    Cough     Patient complains of a cough, nasal congestion starting Thurday evening  No fever   Virtual Regular Visit        Encounter provider Bianca Verdugo MD    Provider located at [address] [id number]      Recent Visits  No visits were found meeting these conditions  Showing recent visits within past 7 days and meeting all other requirements  Today's Visits  Date Type Provider Dept   06/14/22 Telemedicine Bianca Verdugo MD [facility]   Showing today's visits and meeting all other requirements  Future Appointments  No visits were found meeting these conditions    Showing future appointments within next 150 days and meeting all other requirements       The patient was identified by name and date of birth  Joey Henderson was informed that this is a telemedicine visit and that the visit is being conducted through [facility] and patient was informed this is a secure, HIPAA-complaint platform  She agrees to proceed     My office door was closed  No one else was in the room  She acknowledged consent and understanding of privacy and security of the video platform  The patient has agreed to participate and understands they can discontinue the visit at any time  Patient is aware this is a billable service  Basil Mejía is a 45 y o  female   HPI   Patient is a 45years old female with history of carpal tunnel syndrome and lumbar disc herniation with radiculopathy evaluated today via virtual visit for acute URI symptoms  Her symptoms started 5 days ago  It was preceded by allergy type symptoms  She then developed cough, postnasal drip, nasal and sinus congestion  Yesterday she had change under nasal discharge yellowish/greenish discharge  She denies fevers or chills, shortness of breath or chest pain  No sick contacts  Home COVID test was negative  Past Medical History:   Diagnosis Date    Anxiety     Carpal tunnel syndrome     right wrist    Chronic back pain     Depression        Past Surgical History:   Procedure Laterality Date    CARPAL TUNNEL RELEASE Left 10/2021    NO PAST SURGERIES         Current Outpatient Medications   Medication Sig Dispense Refill    azithromycin (Zithromax) 250 mg tablet Take 2 tablets (500 mg total) by mouth daily for 1 day, THEN 1 tablet (250 mg total) daily for 4 days  6 tablet 0    cyclobenzaprine (FLEXERIL) 5 mg tablet Take 1 tablet (5 mg total) by mouth 3 (three) times a day as needed for muscle spasms for up to 7 days 20 tablet 0     No current facility-administered medications for this visit          Allergies   Allergen Reactions    Bactrim [Sulfamethoxazole-Trimethoprim] Hives       Review of Systems   Constitutional: Negative for chills and fever  HENT: Positive for congestion, postnasal drip, rhinorrhea, sinus pressure and sneezing  Negative for ear pain  Eyes: Negative for pain and visual disturbance  Respiratory: Positive for cough  Negative for shortness of breath  Cardiovascular: Negative for chest pain and palpitations  Gastrointestinal: Negative for abdominal pain and vomiting  Genitourinary: Negative for dysuria and hematuria  Musculoskeletal: Negative for arthralgias and back pain  Skin: Negative for color change and rash  Neurological: Negative for seizures and syncope  All other systems reviewed and are negative  Video Exam    Vitals:    06/14/22 0940   Temp: 97 7 °F (36 5 °C)   Weight: 65 8 kg (145 lb)   Height: 5' 4" (1 626 m)       Physical Exam  Constitutional:       General: She is not in acute distress  Appearance: Normal appearance  She is not ill-appearing or toxic-appearing  HENT:      Head:      Comments: Tenderness over the maxillary sinus when asked the patient to palpate  Pulmonary:      Effort: No respiratory distress  Neurological:      Mental Status: She is alert  I spent 15 minutes directly with the patient during this visit    [address] verbally agrees to participate in [employer]  Pt is aware that [employer] could be limited without vital signs or the ability to perform a full hands-on physical Terri Purcell understands she or the provider may request at any time to terminate the video visit and request the patient to seek care or treatment in person  3-5x/week

## 2022-06-29 ENCOUNTER — OFFICE VISIT (OUTPATIENT)
Dept: INTERNAL MEDICINE CLINIC | Facility: OTHER | Age: 52
End: 2022-06-29
Payer: COMMERCIAL

## 2022-06-29 VITALS
HEIGHT: 64 IN | SYSTOLIC BLOOD PRESSURE: 104 MMHG | TEMPERATURE: 98.9 F | WEIGHT: 149 LBS | OXYGEN SATURATION: 99 % | BODY MASS INDEX: 25.44 KG/M2 | HEART RATE: 67 BPM | DIASTOLIC BLOOD PRESSURE: 70 MMHG

## 2022-06-29 DIAGNOSIS — Z00.00 ANNUAL PHYSICAL EXAM: Primary | ICD-10-CM

## 2022-06-29 PROCEDURE — 3008F BODY MASS INDEX DOCD: CPT | Performed by: INTERNAL MEDICINE

## 2022-06-29 PROCEDURE — 99396 PREV VISIT EST AGE 40-64: CPT | Performed by: INTERNAL MEDICINE

## 2022-06-29 PROCEDURE — 1036F TOBACCO NON-USER: CPT | Performed by: INTERNAL MEDICINE

## 2022-06-29 NOTE — PATIENT INSTRUCTIONS
Wellness Visit for Adults   AMBULATORY CARE:   A wellness visit  is when you see your healthcare provider to get screened for health problems  Your healthcare provider will also give you advice on how to stay healthy  Write down your questions so you remember to ask them  Ask your healthcare provider how often you should have a wellness visit  What happens at a wellness visit:  Your healthcare provider will ask about your health, and your family history of health problems  This includes high blood pressure, heart disease, and cancer  He or she will ask if you have symptoms that concern you, if you smoke, and about your mood  You may also be asked about your intake of medicines, supplements, food, and alcohol  Any of the following may be done:  · Your weight  will be checked  Your height may also be checked so your body mass index (BMI) can be calculated  Your BMI shows if you are at a healthy weight  · Your blood pressure  and heart rate will be checked  Your temperature may also be checked  · Blood and urine tests  may be done  Blood tests may be done to check your cholesterol levels  Abnormal cholesterol levels increase your risk for heart disease and stroke  You may also need a blood or urine test to check for diabetes if you are at increased risk  Urine tests may be done to look for signs of an infection or kidney disease  · A physical exam  includes checking your heartbeat and lungs with a stethoscope  Your healthcare provider may also check your skin to look for sun damage  · Screening tests  may be recommended  A screening test is done to check for diseases that may not cause symptoms  The screening tests you may need depend on your age, gender, family history, and lifestyle habits  For example, colorectal screening may be recommended if you are 48years old or older  Screening tests you need if you are a woman:   · A Pap smear  is used to screen for cervical cancer   Pap smears are usually done every 3 to 5 years depending on your age  You may need them more often if you have had abnormal Pap smear test results in the past  Ask your healthcare provider how often you should have a Pap smear  · A mammogram  is an x-ray of your breasts to screen for breast cancer  Experts recommend mammograms every 2 years starting at age 48 years  You may need a mammogram at age 52 years or younger if you have an increased risk for breast cancer  Talk to your healthcare provider about when you should start having mammograms and how often you need them  Vaccines you may need:   · Get an influenza vaccine  every year  The influenza vaccine protects you from the flu  Several types of viruses cause the flu  The viruses change over time, so new vaccines are made each year  · Get a tetanus-diphtheria (Td) booster vaccine  every 10 years  This vaccine protects you against tetanus and diphtheria  Tetanus is a severe infection that may cause painful muscle spasms and lockjaw  Diphtheria is a severe bacterial infection that causes a thick covering in the back of your mouth and throat  · Get a human papillomavirus (HPV) vaccine  if you are female and aged 23 to 32 or male 23 to 24 and never received it  This vaccine protects you from HPV infection  HPV is the most common infection spread by sexual contact  HPV may also cause vaginal, penile, and anal cancers  · Get a pneumococcal vaccine  if you are aged 72 years or older  The pneumococcal vaccine is an injection given to protect you from pneumococcal disease  Pneumococcal disease is an infection caused by pneumococcal bacteria  The infection may cause pneumonia, meningitis, or an ear infection  · Get a shingles vaccine  if you are 60 or older, even if you have had shingles before  The shingles vaccine is an injection to protect you from the varicella-zoster virus  This is the same virus that causes chickenpox   Shingles is a painful rash that develops in people who had chickenpox or have been exposed to the virus  How to eat healthy:  My Plate is a model for planning healthy meals  It shows the types and amounts of foods that should go on your plate  Fruits and vegetables make up about half of your plate, and grains and protein make up the other half  A serving of dairy is included on the side of your plate  The amount of calories and serving sizes you need depends on your age, gender, weight, and height  Examples of healthy foods are listed below:  · Eat a variety of vegetables  such as dark green, red, and orange vegetables  You can also include canned vegetables low in sodium (salt) and frozen vegetables without added butter or sauces  · Eat a variety of fresh fruits , canned fruit in 100% juice, frozen fruit, and dried fruit  · Include whole grains  At least half of the grains you eat should be whole grains  Examples include whole-wheat bread, wheat pasta, brown rice, and whole-grain cereals such as oatmeal     · Eat a variety of protein foods such as seafood (fish and shellfish), lean meat, and poultry without skin (turkey and chicken)  Examples of lean meats include pork leg, shoulder, or tenderloin, and beef round, sirloin, tenderloin, and extra lean ground beef  Other protein foods include eggs and egg substitutes, beans, peas, soy products, nuts, and seeds  · Choose low-fat dairy products such as skim or 1% milk or low-fat yogurt, cheese, and cottage cheese  · Limit unhealthy fats  such as butter, hard margarine, and shortening  Exercise:  Exercise at least 30 minutes per day on most days of the week  Some examples of exercise include walking, biking, dancing, and swimming  You can also fit in more physical activity by taking the stairs instead of the elevator or parking farther away from stores  Include muscle strengthening activities 2 days each week  Regular exercise provides many health benefits   It helps you manage your weight, and decreases your risk for type 2 diabetes, heart disease, stroke, and high blood pressure  Exercise can also help improve your mood  Ask your healthcare provider about the best exercise plan for you  General health and safety guidelines:   · Do not smoke  Nicotine and other chemicals in cigarettes and cigars can cause lung damage  Ask your healthcare provider for information if you currently smoke and need help to quit  E-cigarettes or smokeless tobacco still contain nicotine  Talk to your healthcare provider before you use these products  · Limit alcohol  A drink of alcohol is 12 ounces of beer, 5 ounces of wine, or 1½ ounces of liquor  · Lose weight, if needed  Being overweight increases your risk of certain health conditions  These include heart disease, high blood pressure, type 2 diabetes, and certain types of cancer  · Protect your skin  Do not sunbathe or use tanning beds  Use sunscreen with a SPF 15 or higher  Apply sunscreen at least 15 minutes before you go outside  Reapply sunscreen every 2 hours  Wear protective clothing, hats, and sunglasses when you are outside  · Drive safely  Always wear your seatbelt  Make sure everyone in your car wears a seatbelt  A seatbelt can save your life if you are in an accident  Do not use your cell phone when you are driving  This could distract you and cause an accident  Pull over if you need to make a call or send a text message  · Practice safe sex  Use latex condoms if are sexually active and have more than one partner  Your healthcare provider may recommend screening tests for sexually transmitted infections (STIs)  · Wear helmets, lifejackets, and protective gear  Always wear a helmet when you ride a bike or motorcycle, go skiing, or play sports that could cause a head injury  Wear protective equipment when you play sports  Wear a lifejacket when you are on a boat or doing water sports      © Copyright [employer] 2022 Information is for End User's use only and may not be sold, redistributed or otherwise used for commercial purposes  All illustrations and images included in CareNotes® are the copyrighted property of [employer]  or [employer]  The above information is an  only  It is not intended as medical advice for individual conditions or treatments  Talk to your doctor, nurse or pharmacist before following any medical regimen to see if it is safe and effective for you  Weight Management   AMBULATORY CARE:   Why it is important to manage your weight:  Being overweight increases your risk of health conditions such as heart disease, high blood pressure, type 2 diabetes, and certain types of cancer  It can also increase your risk for osteoarthritis, sleep apnea, and other respiratory problems  Aim for a slow, steady weight loss  Even a small amount of weight loss can lower your risk of health problems  Risks of being overweight:  Extra weight can cause many health problems, including the following:  · Diabetes (high blood sugar level)    · High blood pressure or high cholesterol    · Heart disease    · Stroke    · Gallbladder or liver disease    · Cancer of the colon, breast, prostate, liver, or kidney    · Sleep apnea    · Arthritis or gout    Screening  is done to check for health conditions before you have signs or symptoms  If you are 28to 79years old, your blood sugar level may be checked every 3 years for signs of prediabetes or diabetes  Your healthcare provider will check your blood pressure at each visit  High blood pressure can lead to a stroke or other problems  Your provider may check for signs of heart disease, cancer, or other health problems  How to lose weight safely:  A safe and healthy way to lose weight is to eat fewer calories and get regular exercise  · You can lose up about 1 pound a week by decreasing the number of calories you eat by 500 calories each day   You can decrease calories by eating smaller portion sizes or by cutting out high-calorie foods  Read labels to find out how many calories are in the foods you eat  · You can also burn calories with exercise such as walking, swimming, or biking  You will be more likely to keep weight off if you make these changes part of your lifestyle  Exercise at least 30 minutes per day on most days of the week  You can also fit in more physical activity by taking the stairs instead of the elevator or parking farther away from stores  Ask your healthcare provider about the best exercise plan for you  Healthy meal plan for weight management:  A healthy meal plan includes a variety of foods, contains fewer calories, and helps you stay healthy  A healthy meal plan includes the following:     · Eat whole-grain foods more often  A healthy meal plan should contain fiber  Fiber is the part of grains, fruits, and vegetables that is not broken down by your body  Whole-grain foods are healthy and provide extra fiber in your diet  Some examples of whole-grain foods are whole-wheat breads and pastas, oatmeal, brown rice, and bulgur  · Eat a variety of vegetables every day  Include dark, leafy greens such as spinach, kale, floresita greens, and mustard greens  Eat yellow and orange vegetables such as carrots, sweet potatoes, and winter squash  · Eat a variety of fruits every day  Choose fresh or canned fruit (canned in its own juice or light syrup) instead of juice  Fruit juice has very little or no fiber  · Eat low-fat dairy foods  Drink fat-free (skim) milk or 1% milk  Eat fat-free yogurt and low-fat cottage cheese  Try low-fat cheeses such as mozzarella and other reduced-fat cheeses  · Choose meat and other protein foods that are low in fat  Choose beans or other legumes such as split peas or lentils  Choose fish, skinless poultry (chicken or turkey), or lean cuts of red meat (beef or pork)   Before you cook meat or poultry, cut off any visible fat  · Use less fat and oil  Try baking foods instead of frying them  Add less fat, such as margarine, sour cream, regular salad dressing and mayonnaise to foods  Eat fewer high-fat foods  Some examples of high-fat foods include french fries, doughnuts, ice cream, and cakes  · Eat fewer sweets  Limit foods and drinks that are high in sugar  This includes candy, cookies, regular soda, and sweetened drinks  Ways to decrease calories:   · Eat smaller portions  ? Use a small plate with smaller servings  ? Do not eat second helpings  ? When you eat at a restaurant, ask for a box and place half of your meal in the box before you eat  ? Share an entrée with someone else  · Replace high-calorie snacks with healthy, low-calorie snacks  ? Choose fresh fruit, vegetables, fat-free rice cakes, or air-popped popcorn instead of potato chips, nuts, or chocolate  ? Choose water or calorie-free drinks instead of soda or sweetened drinks  · Do not shop for groceries when you are hungry  You may be more likely to make unhealthy food choices  Take a grocery list of healthy foods and shop after you have eaten  · Eat regular meals  Do not skip meals  Skipping meals can lead to overeating later in the day  This can make it harder for you to lose weight  Eat a healthy snack in place of a meal if you do not have time to eat a regular meal  Talk with a dietitian to help you create a meal plan and schedule that is right for you  Other things to consider as you try to lose weight:   · Be aware of situations that may give you the urge to overeat, such as eating while watching television  Find ways to avoid these situations  For example, read a book, go for a walk, or do crafts  · Meet with a weight loss support group or friends who are also trying to lose weight  This may help you stay motivated to continue working on your weight loss goals      © Copyright [employer] 2022 Information is for End User's use only and may not be sold, redistributed or otherwise used for commercial purposes  All illustrations and images included in CareNotes® are the copyrighted property of [employer]  or [employer]  The above information is an  only  It is not intended as medical advice for individual conditions or treatments  Talk to your doctor, nurse or pharmacist before following any medical regimen to see if it is safe and effective for you

## 2022-06-29 NOTE — PROGRESS NOTES
ADULT ANNUAL PHYSICAL  [facility] CARE [place]    NAME: Amy Patino  AGE: 45 y o  SEX: female  : 1970     DATE: 2022     Assessment and Plan:     Problem List Items Addressed This Visit    None         Immunizations and preventive care screenings were discussed with patient today  Appropriate education was printed on patient's after visit summary  Counseling:  Alcohol/drug use: discussed moderation in alcohol intake, the recommendations for healthy alcohol use, and avoidance of illicit drug use  Dental Health: discussed importance of regular tooth brushing, flossing, and dental visits  Injury prevention: discussed safety/seat belts, safety helmets, smoke detectors, carbon dioxide detectors, and smoking near bedding or upholstery  Sexual health: discussed sexually transmitted diseases, partner selection, use of condoms, avoidance of unintended pregnancy, and contraceptive alternatives  · Exercise: the importance of regular exercise/physical activity was discussed  Recommend exercise 3-5 times per week for at least 30 minutes  BMI Counseling: Body mass index is 25 58 kg/m²  The BMI is above normal  Nutrition recommendations include decreasing portion sizes, encouraging healthy choices of fruits and vegetables and moderation in carbohydrate intake  Exercise recommendations include moderate physical activity 150 minutes/week  Rationale for BMI follow-up plan is due to patient being overweight or obese  No follow-ups on file  Chief Complaint:     Chief Complaint   Patient presents with    Physical Exam     Pt would like her toe looked ar right pinky toe     HM     Mammo - pt is scheduled in        History of Present Illness:     Adult Annual Physical   Patient here for a comprehensive physical exam  The patient reports R LITTLE TOE PAIN AFTER CONTUSION      Diet and Physical Activity  · Diet/Nutrition: well balanced diet  · Exercise: moderate cardiovascular exercise and strength training exercises  Depression Screening  PHQ-2/9 Depression Screening         General Health  · Sleep: sleeps poorly  · Hearing: normal - bilateral   · Vision: wears glasses  · Dental: regular dental visits  /GYN Health  · Patient is: postmenopausal  · Last menstrual period: NONE  · Contraceptive method: NONE  Review of Systems:     Review of Systems   Constitutional: Positive for fatigue  Negative for chills  HENT: Negative for congestion, ear pain, hearing loss, postnasal drip, sinus pressure, sore throat and voice change  Eyes: Negative for pain, discharge and visual disturbance  Respiratory: Negative for cough, chest tightness and shortness of breath  Cardiovascular: Negative for chest pain, palpitations and leg swelling  Gastrointestinal: Negative for abdominal pain, blood in stool, diarrhea, nausea and rectal pain  Genitourinary: Negative for difficulty urinating, dysuria and urgency  Musculoskeletal: Negative for arthralgias and joint swelling  Skin: Negative for rash  Allergic/Immunologic: Negative for environmental allergies and food allergies  Neurological: Negative for dizziness, tremors, weakness, numbness and headaches  Hematological: Negative for adenopathy  Psychiatric/Behavioral: Positive for sleep disturbance (No problem initiating the sleep but she cannot maintain the sleep wake up at 3:00 a m  And then longer to go back to sleep)  Negative for behavioral problems and hallucinations        Past Medical History:     Past Medical History:   Diagnosis Date    Anxiety     Carpal tunnel syndrome     right wrist    Chronic back pain     Depression       Past Surgical History:     Past Surgical History:   Procedure Laterality Date    CARPAL TUNNEL RELEASE Left 10/2021    NO PAST SURGERIES        Social History:     Social History     Socioeconomic History    Marital status: /Civil [employer]     Spouse name: None    Number of children: None    Years of education: None    Highest education level: None   Occupational History    None   Tobacco Use    Smoking status: Never Smoker    Smokeless tobacco: Never Used   Vaping Use    Vaping Use: Never used   Substance and Sexual Activity    Alcohol use: Yes     Comment: socially    Drug use: Never     Comment: Denied drug use    Sexual activity: None   Other Topics Concern    None   Social History Narrative    None     Social Determinants of Health     Financial Resource Strain: Not on file   Food Insecurity: Not on file   Transportation Needs: Not on file   Physical Activity: Not on file   Stress: Not on file   Social Connections: Not on file   Intimate Partner Violence: Not on file   Housing Stability: Not on file      Family History:     Family History   Problem Relation Age of Onset    Hypertension Mother     Vision loss Mother         UNSURE WHY    Breast cancer Maternal Aunt       Current Medications:     Current Outpatient Medications   Medication Sig Dispense Refill    cyclobenzaprine (FLEXERIL) 5 mg tablet Take 1 tablet (5 mg total) by mouth 3 (three) times a day as needed for muscle spasms for up to 7 days 20 tablet 0     No current facility-administered medications for this visit  Allergies: Allergies   Allergen Reactions    Bactrim [Sulfamethoxazole-Trimethoprim] Hives      Physical Exam:     /70 (BP Location: Left arm, Patient Position: Sitting, Cuff Size: Adult)   Pulse 67   Temp 98 9 °F (37 2 °C) (Temporal)   Ht 5' 4" (1 626 m)   Wt 67 6 kg (149 lb)   SpO2 99%   BMI 25 58 kg/m²     Physical Exam  Vitals and nursing note reviewed  Constitutional:       General: She is not in acute distress  Appearance: She is well-developed  HENT:      Head: Normocephalic and atraumatic        Right Ear: Tympanic membrane normal       Left Ear: Tympanic membrane normal       Nose: Nose normal  Mouth/Throat:      Mouth: Mucous membranes are moist    Eyes:      Extraocular Movements: Extraocular movements intact  Conjunctiva/sclera: Conjunctivae normal       Pupils: Pupils are equal, round, and reactive to light  Neck:      Vascular: No carotid bruit  Cardiovascular:      Rate and Rhythm: Normal rate and regular rhythm  Pulses: Normal pulses  Heart sounds: No murmur heard  No friction rub  Pulmonary:      Effort: Pulmonary effort is normal  No respiratory distress  Breath sounds: Normal breath sounds  Abdominal:      General: Abdomen is flat  Bowel sounds are normal       Palpations: Abdomen is soft  Tenderness: There is no abdominal tenderness  Musculoskeletal:         General: Normal range of motion  Cervical back: Neck supple  No rigidity or tenderness  Lymphadenopathy:      Cervical: No cervical adenopathy  Skin:     General: Skin is warm and dry  Neurological:      General: No focal deficit present  Mental Status: She is alert and oriented to person, place, and time  Mental status is at baseline     Psychiatric:         Mood and Affect: Mood normal          Behavior: Behavior normal           Masood Means [address] [place] [address]  PRIMARY CARE [address] [place] [address]

## 2022-08-12 ENCOUNTER — AMB VIDEO VISIT (OUTPATIENT)
Dept: OTHER | Facility: HOSPITAL | Age: 52
End: 2022-08-12

## 2022-08-12 DIAGNOSIS — H10.32 ACUTE BACTERIAL CONJUNCTIVITIS OF LEFT EYE: Primary | ICD-10-CM

## 2022-08-12 PROBLEM — E66.811 OBESITY (BMI 30.0-34.9): Status: RESOLVED | Noted: 2020-11-03 | Resolved: 2022-08-12

## 2022-08-12 PROBLEM — G89.29 ACUTE EXACERBATION OF CHRONIC LOW BACK PAIN: Status: RESOLVED | Noted: 2022-02-04 | Resolved: 2022-08-12

## 2022-08-12 PROBLEM — J06.9 UPPER RESPIRATORY TRACT INFECTION: Status: RESOLVED | Noted: 2022-06-14 | Resolved: 2022-08-12

## 2022-08-12 PROBLEM — Z12.11 SCREENING FOR COLON CANCER: Status: RESOLVED | Noted: 2019-08-13 | Resolved: 2022-08-12

## 2022-08-12 PROBLEM — Z00.00 WELLNESS EXAMINATION: Status: RESOLVED | Noted: 2019-08-13 | Resolved: 2022-08-12

## 2022-08-12 PROBLEM — J01.01 ACUTE RECURRENT MAXILLARY SINUSITIS: Status: RESOLVED | Noted: 2022-06-14 | Resolved: 2022-08-12

## 2022-08-12 PROBLEM — E66.9 OBESITY (BMI 30.0-34.9): Status: RESOLVED | Noted: 2020-11-03 | Resolved: 2022-08-12

## 2022-08-12 PROBLEM — Z12.31 ENCOUNTER FOR SCREENING MAMMOGRAM FOR MALIGNANT NEOPLASM OF BREAST: Status: RESOLVED | Noted: 2019-08-13 | Resolved: 2022-08-12

## 2022-08-12 PROBLEM — M54.50 ACUTE EXACERBATION OF CHRONIC LOW BACK PAIN: Status: RESOLVED | Noted: 2022-02-04 | Resolved: 2022-08-12

## 2022-08-12 PROCEDURE — ECARE PR SL URGENT CARE VIRTUAL VISIT: Performed by: PHYSICIAN ASSISTANT

## 2022-08-12 RX ORDER — TOBRAMYCIN 3 MG/ML
1 SOLUTION/ DROPS OPHTHALMIC
Qty: 1.3 ML | Refills: 0 | Status: SHIPPED | OUTPATIENT
Start: 2022-08-12 | End: 2022-08-17

## 2022-08-12 RX ORDER — TOBRAMYCIN 3 MG/ML
1 SOLUTION/ DROPS OPHTHALMIC
Qty: 1.3 ML | Refills: 0 | Status: SHIPPED | OUTPATIENT
Start: 2022-08-12 | End: 2022-08-12

## 2022-08-12 NOTE — CARE ANYWHERE EVISITS
Visit Summary for Daniele LAMBERT - Gender: Female - Date of Birth: [id number]  Date: [id number] - Duration: 11 minutes  Patient: Daniele LAMBERT  Provider: Rubi Mcgee PA-C    Patient Contact Information  Address  [place] [address] [place] [id number]  [phone]    Visit Topics    Triage Questions   What is your current physical address in the event of a medical emergency? Answer []  Are you allergic to any medications? Answer []  Are you now or could you be pregnant? Answer []  Do you have any immune system compromise or chronic lung   disease? Answer []  Do you have any vulnerable family members in the home (infant, pregnant, cancer, elderly)? Answer []     Conversation Transcripts  [0A][0A] [Notification] You are connected with Rubi Mcgee PA-C, Urgent Care Specialist [0A][Notification] Rojas Rosa is located in [facility]  [0A][Notification] Rojas Rosa has shared health history  Dioni Jordan  [0A]    Diagnosis  Unspecified acute conjunctivitis, left eye    Procedures  Value: [id number] Code: CPT-4 UNLISTED E&M SERVICE    Medications Prescribed    No prescriptions ordered    Electronically signed by: Davina Martin(NPI [id number])

## 2022-08-12 NOTE — PROGRESS NOTES
Video Visit - Brianna Lemus 47 y o  female MRN: [id number]    REQUIRED DOCUMENTATION:         1  This service was provided via [employer]  2  Provider located at [address] [place] [address]  3  [facility] provider: Radha Hutchison PA-C   4  Identify all parties in room with patient during [facility] visit:  No one else  5  After connecting through [employer], patient was identified by name and date of birth  Patient was then informed that this was a Telemedicine visit and that the exam was being conducted confidentially over secure lines  My office door was closed  No one else was in the room  Patient acknowledged consent and understanding of privacy and security of the Telemedicine visit  I informed the patient that I have reviewed their record in Epic and presented the opportunity for them to ask any questions regarding the visit today  The patient agreed to participate  VITALS: Heart Rate: 74 BPM, Respiratory Rate: 20 RPM, Temperature 96 1° F (temporal scanner), Blood Pressure Unavailable mmHg, Pulse Ox Unavailable % on RA    HPI  Pt reports L pink eye starting yesterday  Eye was itchy, purulent drainage, crusting shut  Nothing tried  Wears glasses, no contacts  No visual changes or pain  No URI sx or systemic sx  Last eye exam was February  Physical Exam  Constitutional:       General: She is not in acute distress  Appearance: Normal appearance  She is not toxic-appearing  HENT:      Head: Normocephalic and atraumatic  Nose: No rhinorrhea  Mouth/Throat:      Mouth: Mucous membranes are moist    Eyes:      General:         Left eye: Discharge (bilateral canthi) present  Extraocular Movements:      Right eye: Normal extraocular motion  Left eye: Normal extraocular motion  Conjunctiva/sclera:      Left eye: Left conjunctiva is injected  Pulmonary:      Effort: Pulmonary effort is normal  No respiratory distress  Breath sounds:  No wheezing (no gross audible wheeze through computer)  Musculoskeletal:      Cervical back: Normal range of motion  Skin:     Findings: No rash (on face or neck)  Neurological:      Mental Status: She is alert  Cranial Nerves: No dysarthria or facial asymmetry  Psychiatric:         Mood and Affect: Mood normal          Behavior: Behavior normal          Diagnoses and all orders for this visit:    Acute bacterial conjunctivitis of left eye  -     Discontinue: tobramycin (Tobrex) 0 3 % SOLN; Administer 1 drop to the right eye every 4 (four) hours while awake for 5 days  -     tobramycin (Tobrex) 0 3 % SOLN; Administer 1 drop into the left eye every 4 (four) hours while awake for 5 days      Patient Instructions   Conjunctivitis   WHAT YOU NEED TO KNOW:   Conjunctivitis, or pink eye, is inflammation of your conjunctiva  The conjunctiva is a thin tissue that covers the front of your eye and the back of your eyelids  The conjunctiva helps protect your eye and keep it moist  Conjunctivitis may be caused by bacteria, allergies, or a virus  If your conjunctivitis is caused by bacteria, it may get better on its own in about 7 days  Viral conjunctivitis can last up to 3 weeks  DISCHARGE INSTRUCTIONS:   Return to the emergency department if:   · You have worsening eye pain  · The swelling in your eye gets worse, even after treatment  · Your vision suddenly becomes worse or you cannot see at all  Contact your healthcare provider if:   · You develop a fever and ear pain  · You have tiny bumps or spots of blood on your eye  · You have questions or concerns about your condition or care  Manage your symptoms:   · Apply a cool compress  Wet a washcloth with cold water and place it on your eye  This will help decrease itching and irritation  · Do not wear contact lenses  They can irritate your eye  Throw away the pair you are using and ask when you can wear them again   Use a new pair of lenses when your healthcare provider says it is okay  · Avoid irritants  Stay away from smoke filled areas  Shield your eyes from wind and sun  · Flush your eye  You may need to flush your eye with saline to help decrease your symptoms  Ask for more information on how to flush your eye  Medicines:  Treatment depends on what is causing your conjunctivitis  You may be given any of the following:  · Allergy medicine  helps decrease itchy, red, swollen eyes caused by allergies  It may be given as a pill, eye drops, or nasal spray  · Antibiotics  may be needed if your conjunctivitis is caused by bacteria  This medicine may be given as a pill, eye drops, or eye ointment  · Take your medicine as directed  Contact your healthcare provider if you think your medicine is not helping or if you have side effects  Tell him or her if you are allergic to any medicine  Keep a list of the medicines, vitamins, and herbs you take  Include the amounts, and when and why you take them  Bring the list or the pill bottles to follow-up visits  Carry your medicine list with you in case of an emergency  Prevent the spread of conjunctivitis:   · Wash your hands with soap and water often  Wash your hands before and after you touch your eyes  Also wash your hands before you prepare or eat food and after you use the bathroom or change a diaper  · Avoid allergens  Try to avoid the things that cause your allergies, such as pets, dust, or grass  · Avoid contact with others  Do not share towels or washcloths  Try to stay away from others as much as possible  Ask when you can return to work or school  · Throw away eye makeup  The bacteria that caused your conjunctivitis can stay in eye makeup  Throw away mascara and other eye makeup  © Copyright [employer] 2022 Information is for End User's use only and may not be sold, redistributed or otherwise used for commercial purposes   All illustrations and images included in [facility] are the copyrighted property of [employer]  or [address] [place], [place] [address]   The above information is an  only  It is not intended as medical advice for individual conditions or treatments  Talk to your doctor, nurse or pharmacist before following any medical regimen to see if it is safe and effective for you

## 2022-08-12 NOTE — PATIENT INSTRUCTIONS
Conjunctivitis   WHAT YOU NEED TO KNOW:   Conjunctivitis, or pink eye, is inflammation of your conjunctiva  The conjunctiva is a thin tissue that covers the front of your eye and the back of your eyelids  The conjunctiva helps protect your eye and keep it moist  Conjunctivitis may be caused by bacteria, allergies, or a virus  If your conjunctivitis is caused by bacteria, it may get better on its own in about 7 days  Viral conjunctivitis can last up to 3 weeks  DISCHARGE INSTRUCTIONS:   Return to the emergency department if:   You have worsening eye pain  The swelling in your eye gets worse, even after treatment  Your vision suddenly becomes worse or you cannot see at all  Contact your healthcare provider if:   You develop a fever and ear pain  You have tiny bumps or spots of blood on your eye  You have questions or concerns about your condition or care  Manage your symptoms:   Apply a cool compress  Wet a washcloth with cold water and place it on your eye  This will help decrease itching and irritation  Do not wear contact lenses  They can irritate your eye  Throw away the pair you are using and ask when you can wear them again  Use a new pair of lenses when your healthcare provider says it is okay  Avoid irritants  Stay away from smoke filled areas  Shield your eyes from wind and sun  Flush your eye  You may need to flush your eye with saline to help decrease your symptoms  Ask for more information on how to flush your eye  Medicines:  Treatment depends on what is causing your conjunctivitis  You may be given any of the following: Allergy medicine  helps decrease itchy, red, swollen eyes caused by allergies  It may be given as a pill, eye drops, or nasal spray  Antibiotics  may be needed if your conjunctivitis is caused by bacteria  This medicine may be given as a pill, eye drops, or eye ointment  Take your medicine as directed    Contact your healthcare provider if you think your medicine is not helping or if you have side effects  Tell him or her if you are allergic to any medicine  Keep a list of the medicines, vitamins, and herbs you take  Include the amounts, and when and why you take them  Bring the list or the pill bottles to follow-up visits  Carry your medicine list with you in case of an emergency  Prevent the spread of conjunctivitis:   Wash your hands with soap and water often  Wash your hands before and after you touch your eyes  Also wash your hands before you prepare or eat food and after you use the bathroom or change a diaper  Avoid allergens  Try to avoid the things that cause your allergies, such as pets, dust, or grass  Avoid contact with others  Do not share towels or washcloths  Try to stay away from others as much as possible  Ask when you can return to work or school  Throw away eye makeup  The bacteria that caused your conjunctivitis can stay in eye makeup  Throw away mascara and other eye makeup  © Copyright [employer] 2022 Information is for End User's use only and may not be sold, redistributed or otherwise used for commercial purposes  All illustrations and images included in CareNotes® are the copyrighted property of A D A M , Inc  or [facility] Hector Ponce   The above information is an  only  It is not intended as medical advice for individual conditions or treatments  Talk to your doctor, nurse or pharmacist before following any medical regimen to see if it is safe and effective for you

## 2022-10-24 ENCOUNTER — TELEPHONE (OUTPATIENT)
Dept: INTERNAL MEDICINE CLINIC | Age: 52
End: 2022-10-24

## 2022-10-24 ENCOUNTER — TELEMEDICINE (OUTPATIENT)
Dept: INTERNAL MEDICINE CLINIC | Age: 52
End: 2022-10-24
Payer: COMMERCIAL

## 2022-10-24 VITALS — BODY MASS INDEX: 26.26 KG/M2 | WEIGHT: 153 LBS

## 2022-10-24 DIAGNOSIS — J45.901 REACTIVE AIRWAY DISEASE WITH ACUTE EXACERBATION, UNSPECIFIED ASTHMA SEVERITY, UNSPECIFIED WHETHER PERSISTENT: Primary | ICD-10-CM

## 2022-10-24 PROCEDURE — 99213 OFFICE O/P EST LOW 20 MIN: CPT | Performed by: STUDENT IN AN ORGANIZED HEALTH CARE EDUCATION/TRAINING PROGRAM

## 2022-10-24 RX ORDER — ALBUTEROL SULFATE 90 UG/1
2 AEROSOL, METERED RESPIRATORY (INHALATION) EVERY 6 HOURS PRN
Qty: 18 G | Refills: 5 | Status: SHIPPED | OUTPATIENT
Start: 2022-10-24

## 2022-10-24 RX ORDER — IBUPROFEN 200 MG
400 TABLET ORAL EVERY 6 HOURS PRN
COMMUNITY

## 2022-10-24 NOTE — PATIENT INSTRUCTIONS
-albuterol PRN  -zyrtec HS  -flonase daily  -schedule PFTs with methacholine challenge  -f/u in office in 1-2 weeks

## 2022-10-24 NOTE — PROGRESS NOTES
Virtual Regular Visit    Verification of patient location: Yes    Patient is located in the following state in which I hold an active license [place]    Assessment/Plan:    Problem List Items Addressed This Visit    None     Visit Diagnoses     Reactive airway disease with acute exacerbation, unspecified asthma severity, unspecified whether persistent    -  Primary    Relevant Medications    albuterol (Ventolin HFA) 90 mcg/act inhaler    Other Relevant Orders    Complete PFT with post bronchodilator        History consistent with reactive airway disease  Will refer for formal pulmonary function tests with methacholine challenge, but otherwise start treating as reactive airway with albuterol use  Patient instructed to use inhaler prior to physical exertion, and PRN after  She was visually shown how to use the inhaler, and a request to the pharmacy was made to provide patient with a spacer or guide patient to a spacer  Patient will follow-up in office in 1-2 weeks to assess inhaler efficacy  PFTs will be scheduled in the meantime  Patient is also going to try and OTC antihistamine and flonase in case of underlying allergic rhinitis/post-nasal drip contributing to cough  Reason for visit is   Chief Complaint   Patient presents with   • Wheezing     Started over a month ago- Pt states wheezing is not resolving  Pt states when she is running she hears it worse  Pt does not have a rescue inhaler  Pt states she has never had this issue before-   Pt denies having asthma- Pt states this is there first time it is being addressed  Pt states this morning she had an episode of loose stools, pt states she always has a runny denies  Denies- fever, headache, nausea, congestion  Occasional cough from exercise           Encounter provider Liang Mcdonald, DO    Provider located at [address] [address]      Recent Visits  No visits were found meeting these conditions  Showing recent visits within past 7 days and meeting all other requirements  Today's Visits  Date Type Provider Dept   10/24/22 Telephone Alma Mccain [facility]   [facility]   Showing today's visits and meeting all other requirements  Future Appointments  No visits were found meeting these conditions  Showing future appointments within next 150 days and meeting all other requirements       The patient was identified by name and date of birth  Rene Chopra was informed that this is a telemedicine visit and that the visit is being conducted through the [employer]  She agrees to proceed     My office door was closed  No one else was in the room  She acknowledged consent and understanding of privacy and security of the video platform  The patient has agreed to participate and understands they can discontinue the visit at any time  Patient is aware this is a billable service  Rema Narvaez is a 47 y o  female who is presenting with CC of 1 month of coughing  She had a flu shot one month ago, but other than that, she has had no changes to her recent health  She has not experienced any S&S of URI or other viral infection recently and denies recent or present fever, chills, SOB, n/v/d  Coughing starts only with physical exertion  She has not noticed if symptoms are worse in cold weather or not  After she starts coughing during physical activity, she continues to cough for a few hours after  She describes the sound as wheezing/    She is a lifelong non-smoker, no history of asthma, no family history of asthma  She thinks she may have some underlying seasonal allergies  She is up to date on her mammogram   She takes no medications regularly    No significant cardiac history    Past Medical History:   Diagnosis Date   • Allergic 2019    Supha drugs • Anxiety    • Carpal tunnel syndrome     right wrist   • Chronic back pain    • Depression    • Obesity (BMI 30 0-34 9) 11/03/2020       Past Surgical History:   Procedure Laterality Date   • CARPAL TUNNEL RELEASE Left 10/2021   • NO PAST SURGERIES         Current Outpatient Medications   Medication Sig Dispense Refill   • albuterol (Ventolin HFA) 90 mcg/act inhaler Inhale 2 puffs every 6 (six) hours as needed for wheezing 18 g 5   • cyclobenzaprine (FLEXERIL) 5 mg tablet Take 1 tablet (5 mg total) by mouth 3 (three) times a day as needed for muscle spasms for up to 7 days 20 tablet 0   • ibuprofen (MOTRIN) 200 mg tablet Take 400 mg by mouth every 6 (six) hours as needed for mild pain       No current facility-administered medications for this visit  Allergies   Allergen Reactions   • Bactrim [Sulfamethoxazole-Trimethoprim] Hives       Video Exam    Vitals:    10/24/22 0944   Weight: 69 4 kg (153 lb)       Physical Exam  Constitutional:       General: She is not in acute distress  Appearance: She is not ill-appearing, toxic-appearing or diaphoretic  Pulmonary:      Effort: Pulmonary effort is normal       Comments: Dry asthmatic sounding cough multiple time throughout encounter  No labored speech or obvious stridor    Neurological:      Mental Status: She is alert and oriented to person, place, and time  Psychiatric:         Mood and Affect: Mood normal          Behavior: Behavior normal          Thought Content:  Thought content normal          Judgment: Judgment normal           I spent 18 minutes directly with the patient during this visit

## 2022-10-24 NOTE — TELEPHONE ENCOUNTER
AIDANOM to have patient call back to schedule 1-2 week follow up in person to check in up on her wheezing  Also to get the PFT scheduled for her    Give her the phone number to call and schedule it

## 2022-10-26 ENCOUNTER — HOSPITAL ENCOUNTER (OUTPATIENT)
Dept: PULMONOLOGY | Facility: HOSPITAL | Age: 52
Discharge: HOME | End: 2022-10-26

## 2022-10-26 DIAGNOSIS — J45.901 REACTIVE AIRWAY DISEASE WITH ACUTE EXACERBATION, UNSPECIFIED ASTHMA SEVERITY, UNSPECIFIED WHETHER PERSISTENT: ICD-10-CM

## 2022-10-26 RX ORDER — ALBUTEROL SULFATE 2.5 MG/3ML
2.5 SOLUTION RESPIRATORY (INHALATION) ONCE AS NEEDED
Status: COMPLETED | OUTPATIENT
Start: 2022-10-26 | End: 2022-10-26

## 2022-10-26 RX ADMIN — Medication 0.06 MG: at 08:42

## 2022-10-26 RX ADMIN — ALBUTEROL SULFATE 2.5 MG: 2.5 SOLUTION RESPIRATORY (INHALATION) at 09:04

## 2022-11-02 ENCOUNTER — OFFICE VISIT (OUTPATIENT)
Dept: URGENT CARE | Facility: CLINIC | Age: 52
End: 2022-11-02

## 2022-11-02 VITALS — OXYGEN SATURATION: 98 % | TEMPERATURE: 98.1 F | RESPIRATION RATE: 18 BRPM | HEART RATE: 83 BPM

## 2022-11-02 DIAGNOSIS — U07.1 COVID-19: Primary | ICD-10-CM

## 2022-11-02 DIAGNOSIS — R05.1 ACUTE COUGH: ICD-10-CM

## 2022-11-02 DIAGNOSIS — R51.9 NONINTRACTABLE HEADACHE, UNSPECIFIED CHRONICITY PATTERN, UNSPECIFIED HEADACHE TYPE: ICD-10-CM

## 2022-11-02 LAB
SARS-COV-2 AG UPPER RESP QL IA: POSITIVE
VALID CONTROL: ABNORMAL

## 2022-11-02 RX ORDER — KETOROLAC TROMETHAMINE 30 MG/ML
30 INJECTION, SOLUTION INTRAMUSCULAR; INTRAVENOUS ONCE
Status: COMPLETED | OUTPATIENT
Start: 2022-11-02 | End: 2022-11-02

## 2022-11-02 RX ADMIN — KETOROLAC TROMETHAMINE 30 MG: 30 INJECTION, SOLUTION INTRAMUSCULAR; INTRAVENOUS at 14:05

## 2022-11-02 NOTE — LETTER
[place] [id number]  Dept: [phone]    November 2, 2022    Patient: Ivis León  YOB: 1970    Ivis León was seen and evaluated at our [facility]  Please note if Covid and Flu tests are negative, they may return to work when fever free for 24 hours without the use of a fever reducing agent  If Covid or Flu test is positive, they may return to work on 11/5/22, as this is 5 days from the onset of symptoms  Upon return, they must then adhere to strict masking for an additional 5 days      Sincerely,    Tirso Miguel PA-C

## 2022-11-02 NOTE — PATIENT INSTRUCTIONS
Vitamin D3 2000 IU daily  Vitamin C 1g every 12 hours  Multivitamin daily  Fluids and rest  Flonase 2 sprays each nostril once daily  Zyrtec   Over the counter cold medication as needed such as mucinex  Ibuprofen and/or tylenol for fever, body aches  Follow up with PCP upon confirmation of a positive covid test   Proceed to  ER if symptoms worsen  Stay home except to get medical care    People who are mildly ill with COVID-19 are able to isolate at home during their illness  You should restrict activities outside your home, except for getting medical care  Do not go to work, school, or public areas  Avoid using public transportation, ride-sharing, or taxis  Separate yourself from other people     As much as possible, you should stay in a specific room and away from other people in your home  Also, you should use a separate bathroom, if available  Call ahead before visiting your doctor    If you have a medical appointment, call the healthcare provider and tell them that you have or may have COVID-19  This will help the healthcare provider’s office take steps to keep other people from getting infected or exposed  Wear a facemask    You should wear a facemask when you are around other people (e g , sharing a room or vehicle) or pets and before you enter a healthcare provider’s office  If you are not able to wear a facemask (for example, because it causes trouble breathing), then people who live with you should not stay in the same room with you, or they should wear a facemask if they enter your room  Monitor your symptoms    Seek prompt medical attention if your illness is worsening (e g , difficulty breathing)  Before seeking care, call your healthcare provider and tell them that you have, or are being evaluated for, COVID-19  Put on a facemask before you enter the facility   These steps will help the healthcare provider’s office to keep other people in the office or waiting room from getting infected or exposed  Ask your healthcare provider to call the local or state health department  Persons who are placed under active monitoring or facilitated self-monitoring should follow instructions provided by their local health department or occupational health professionals, as appropriate  If you have a medical emergency and need to call 911, notify the dispatch personnel that you have, or are being evaluated for COVID-19  If possible, put on a facemask before emergency medical services arrive  Please follow Quarantine instructions provided in your work or school note

## 2022-11-02 NOTE — PROGRESS NOTES
[facility]    NAME: Andrew Bowden is a 47 y o  female  : 1970    MRN: [id number]  DATE: 2022  TIME: 2:39 PM    Assessment and Plan   COVID-19 [U07 1]  1  COVID-19     2  Acute cough  Poct Covid 19 Rapid Antigen Test   3  Nonintractable headache, unspecified chronicity pattern, unspecified headache type  ketorolac (TORADOL) injection 30 mg       Patient Instructions     Patient Instructions     Vitamin D3 2000 IU daily  Vitamin C 1g every 12 hours  Multivitamin daily  Fluids and rest  Flonase 2 sprays each nostril once daily  Zyrtec   Over the counter cold medication as needed such as mucinex  Ibuprofen and/or tylenol for fever, body aches  Follow up with PCP upon confirmation of a positive covid test   Proceed to  ER if symptoms worsen  Stay home except to get medical care    People who are mildly ill with COVID-19 are able to isolate at home during their illness  You should restrict activities outside your home, except for getting medical care  Do not go to work, school, or public areas  Avoid using public transportation, ride-sharing, or taxis  Separate yourself from other people     As much as possible, you should stay in a specific room and away from other people in your home  Also, you should use a separate bathroom, if available  Call ahead before visiting your doctor    If you have a medical appointment, call the healthcare provider and tell them that you have or may have COVID-19  This will help the healthcare provider’s office take steps to keep other people from getting infected or exposed  Wear a facemask    You should wear a facemask when you are around other people (e g , sharing a room or vehicle) or pets and before you enter a healthcare provider’s office   If you are not able to wear a facemask (for example, because it causes trouble breathing), then people who live with you should not stay in the same room with you, or they should wear a facemask if they enter your room  Monitor your symptoms    Seek prompt medical attention if your illness is worsening (e g , difficulty breathing)  Before seeking care, call your healthcare provider and tell them that you have, or are being evaluated for, COVID-19  Put on a facemask before you enter the facility  These steps will help the healthcare provider’s office to keep other people in the office or waiting room from getting infected or exposed  Ask your healthcare provider to call the local or [facility] health department  Persons who are placed under active monitoring or facilitated self-monitoring should follow instructions provided by their local health department or occupational health professionals, as appropriate  If you have a medical emergency and need to call 911, notify the dispatch personnel that you have, or are being evaluated for COVID-19  If possible, put on a facemask before emergency medical services arrive  Please follow Quarantine instructions provided in your work or school note  Chief Complaint     Chief Complaint   Patient presents with   • Sore Throat     Pt c/o a headache, sore throat, congestion and fatigue since Sunday  History of Present Illness   50-year-old female here with complaint of cough, nasal congestion and headache  Symptoms started 3 days ago  The headache just does not let up  She has been taking ibuprofen and Tylenol with minimal relief  No fever  She denies any shortness of breath  Review of Systems   Review of Systems   Constitutional: Negative for appetite change, chills and fever  HENT: Positive for congestion and sinus pressure  Negative for ear discharge, ear pain, facial swelling, postnasal drip, sneezing and sore throat  Respiratory: Positive for cough  Negative for shortness of breath and wheezing  Neurological: Positive for headaches         Current Medications     Current Outpatient Medications:   •  albuterol (Ventolin HFA) 90 mcg/act inhaler, Inhale 2 puffs every 6 (six) hours as needed for wheezing, Disp: 18 g, Rfl: 5  •  cyclobenzaprine (FLEXERIL) 5 mg tablet, Take 1 tablet (5 mg total) by mouth 3 (three) times a day as needed for muscle spasms for up to 7 days, Disp: 20 tablet, Rfl: 0  •  ibuprofen (MOTRIN) 200 mg tablet, Take 400 mg by mouth every 6 (six) hours as needed for mild pain, Disp: , Rfl:   No current facility-administered medications for this visit  Current Allergies     Allergies as of 11/02/2022 - Reviewed 11/02/2022   Allergen Reaction Noted   • Bactrim [sulfamethoxazole-trimethoprim] Hives 08/17/2018          The following portions of the patient's history were reviewed and updated as appropriate: allergies, current medications, past family history, past medical history, past social history, past surgical history and problem list    Past Medical History:   Diagnosis Date   • Allergic 2019    Supha drugs   • Anxiety    • Carpal tunnel syndrome     right wrist   • Chronic back pain    • Depression    • Obesity (BMI 30 0-34 9) 11/03/2020     Past Surgical History:   Procedure Laterality Date   • CARPAL TUNNEL RELEASE Left 10/2021   • NO PAST SURGERIES       Family History   Problem Relation Age of Onset   • Hypertension Mother    • Vision loss Mother         UNSURE WHY   • Alcohol abuse Father    • Mental illness Father    • Schizophrenia Father    • Breast cancer Maternal Aunt      Social History     Socioeconomic History   • Marital status: /Civil Union     Spouse name: Not on file   • Number of children: Not on file   • Years of education: Not on file   • Highest education level: Not on file   Occupational History   • Not on file   Tobacco Use   • Smoking status: Never Smoker   • Smokeless tobacco: Never Used   Vaping Use   • Vaping Use: Never used   Substance and Sexual Activity   • Alcohol use:  Yes     Alcohol/week: 4 0 standard drinks     Types: 4 Glasses of wine per week     Comment: socially   • Drug use: Never Comment: Denied drug use   • Sexual activity: Yes   Other Topics Concern   • Not on file   Social History Narrative   • Not on file     Social Determinants of Health     Financial Resource Strain: Not on file   Food Insecurity: Not on file   Transportation Needs: Not on file   Physical Activity: Not on file   Stress: Not on file   Social Connections: Not on file   Intimate Partner Violence: Not on file   Housing Stability: Not on file     Medications have been verified  Objective   Pulse 83   Temp 98 1 °F (36 7 °C)   Resp 18   SpO2 98%      Physical Exam   Physical Exam  Vitals and nursing note reviewed  Constitutional:       General: She is not in acute distress  Appearance: She is well-developed  HENT:      Head: Normocephalic and atraumatic  Right Ear: Tympanic membrane normal       Left Ear: Tympanic membrane normal       Nose: Congestion present  No mucosal edema or rhinorrhea  Right Sinus: No maxillary sinus tenderness or frontal sinus tenderness  Left Sinus: No maxillary sinus tenderness or frontal sinus tenderness  Mouth/Throat:      Pharynx: Posterior oropharyngeal erythema present  No oropharyngeal exudate  Eyes:      Conjunctiva/sclera: Conjunctivae normal    Cardiovascular:      Rate and Rhythm: Normal rate and regular rhythm  Heart sounds: Normal heart sounds  No murmur heard  Musculoskeletal:      Cervical back: Normal range of motion

## 2023-02-21 ENCOUNTER — TELEMEDICINE (OUTPATIENT)
Dept: INTERNAL MEDICINE CLINIC | Facility: OTHER | Age: 53
End: 2023-02-21

## 2023-02-21 VITALS — WEIGHT: 153 LBS | HEIGHT: 64 IN | BODY MASS INDEX: 26.12 KG/M2

## 2023-02-21 DIAGNOSIS — F41.8 ANXIETY WITH DEPRESSION: Primary | ICD-10-CM

## 2023-02-21 RX ORDER — SERTRALINE HYDROCHLORIDE 25 MG/1
25 TABLET, FILM COATED ORAL DAILY
Qty: 7 TABLET | Refills: 0 | Status: SHIPPED | OUTPATIENT
Start: 2023-02-21 | End: 2023-02-28

## 2023-02-21 NOTE — PROGRESS NOTES
Virtual Regular Visit    Verification of patient location:    Patient is located in the following state in which I hold an active license [place]      Assessment/Plan:    Problem List Items Addressed This Visit        Other    Anxiety with depression - Primary    Relevant Medications    sertraline (Zoloft) 25 mg tablet    sertraline (Zoloft) 50 mg tablet     Anxiety with depression  Patient was previously on lexapro with good response but had side effect of weight gain, so she stopped the medication 2 years ago  She is having increased anxiety with depression over the last 5 months and would like to restart medication  She does not want to restart lexapro and would like a medication that is more weight neutral  Will trial another SSRI Zoloft as she had previously responded well to SSRI  · Trial zoloft 25 mg daily x1 week for tolerance and then 50 mg daily thereafter  · If patient cannot tolerate zoloft, can trial buspar  · Will have patient return in 4 weeks to assess need for dose adjustment vs different medication  · Continue to provide psychosocial support       Reason for visit is   Chief Complaint   Patient presents with   • Depression   • Anxiety   • Virtual Brief Visit     Medication discussion  Patient states she was on lexapro years ago, went off due to weight gaining  Would like to start something new with better side effects  Due to anxiety shd is having- issues at home  • Virtual Regular Visit        Encounter provider Basim Graham MD    Provider located at [address] [place] [address]      Recent Visits  No visits were found meeting these conditions    Showing recent visits within past 7 days and meeting all other requirements  Today's Visits  Date Type Provider Dept   02/21/23 Miguelangel Harper MD [facility]   Showing today's visits and meeting all other requirements  Future Appointments  No visits were found meeting these conditions  Showing future appointments within next 150 days and meeting all other requirements       The patient was identified by name and date of birth  Yanelis Holloway was informed that this is a telemedicine visit and that the visit is being conducted through the [employer]  She agrees to proceed     My office door was closed  No one else was in the room  She acknowledged consent and understanding of privacy and security of the video platform  The patient has agreed to participate and understands they can discontinue the visit at any time  Patient is aware this is a billable service  Hien Ivan is a 47 y o  female with PMH of reactive airway disease and anxiety who presents today to discuss restarting medication for anxiety  She was previously on Lexapro for anxiety related to stressors at home including 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  This was from June 2020-Jan 2021  She was on lexapro 10 mg, which she tolerated well, but she had side effect of weight gain  Her mood improved and so she stopped taking the lexapro  Over the last 5 months, she has started having increased anxiety again related to her home life  She also has symptoms of depression too  She reports sleep disturbance because of waking up multiple times a night 2/2 anxiety and having difficulty getting back to sleep  She has had mostly stable appetite but this fluctuates as does her energy level  She does have experienced times where she is "demotivated" and often feels guilty  She does not notice psychomotor slowing and she denies SI/HI  She denies any concern for michael  She would like to start a new medication but wants a medication that is weight neutral  She is not interested in restarting lexapro at this time        HPI   As above     Past Medical History:   Diagnosis Date   • Allergic 2019    [employer]ett drugs   • Anxiety    • Carpal tunnel syndrome     right wrist   • Chronic back pain    • Depression    • Obesity (BMI 30 0-34 9) 11/03/2020       Past Surgical History:   Procedure Laterality Date   • CARPAL TUNNEL RELEASE Left 10/2021   • NO PAST SURGERIES         Current Outpatient Medications   Medication Sig Dispense Refill   • albuterol (Ventolin HFA) 90 mcg/act inhaler Inhale 2 puffs every 6 (six) hours as needed for wheezing 18 g 5   • cyclobenzaprine (FLEXERIL) 5 mg tablet Take 1 tablet (5 mg total) by mouth 3 (three) times a day as needed for muscle spasms for up to 7 days 20 tablet 0   • ibuprofen (MOTRIN) 200 mg tablet Take 400 mg by mouth every 6 (six) hours as needed for mild pain     • sertraline (Zoloft) 25 mg tablet Take 1 tablet (25 mg total) by mouth daily for 7 days 7 tablet 0   • sertraline (Zoloft) 50 mg tablet Take 1 tablet (50 mg total) by mouth daily 30 tablet 1     No current facility-administered medications for this visit  Allergies   Allergen Reactions   • Bactrim [Sulfamethoxazole-Trimethoprim] Hives       Review of Systems   Constitutional: Negative for chills and fever  HENT: Negative for sore throat  Respiratory: Negative for cough and shortness of breath  Cardiovascular: Negative for chest pain  Gastrointestinal: Negative for abdominal pain, diarrhea and nausea  Musculoskeletal: Positive for back pain  Neurological: Negative for dizziness and headaches  Psychiatric/Behavioral: Positive for dysphoric mood and sleep disturbance  Negative for decreased concentration, self-injury and suicidal ideas  The patient is nervous/anxious  The patient is not hyperactive  Video Exam    Vitals:    02/21/23 1414   Weight: 69 4 kg (153 lb)   Height: 5' 4" (1 626 m)       Physical Exam  Constitutional:       General: She is not in acute distress  Eyes:      Extraocular Movements: Extraocular movements intact        Conjunctiva/sclera: Conjunctivae normal    Pulmonary: Effort: No respiratory distress  Neurological:      Mental Status: She is alert  Psychiatric:         Mood and Affect: Affect normal          Speech: Speech normal          Behavior: Behavior normal  Behavior is cooperative            I spent 25 minutes directly with the patient during this visit

## 2023-03-09 ENCOUNTER — OFFICE VISIT (OUTPATIENT)
Dept: INTERNAL MEDICINE CLINIC | Facility: OTHER | Age: 53
End: 2023-03-09

## 2023-03-09 VITALS
SYSTOLIC BLOOD PRESSURE: 112 MMHG | BODY MASS INDEX: 26.8 KG/M2 | HEIGHT: 64 IN | DIASTOLIC BLOOD PRESSURE: 68 MMHG | TEMPERATURE: 98.4 F | WEIGHT: 157 LBS | HEART RATE: 79 BPM | OXYGEN SATURATION: 98 %

## 2023-03-09 DIAGNOSIS — F41.8 ANXIETY WITH DEPRESSION: Primary | ICD-10-CM

## 2023-03-09 DIAGNOSIS — Z78.9 CAFFEINE USE: ICD-10-CM

## 2023-03-09 DIAGNOSIS — F51.01 PRIMARY INSOMNIA: ICD-10-CM

## 2023-03-09 DIAGNOSIS — H53.8 BLURRY VISION, BILATERAL: ICD-10-CM

## 2023-03-09 NOTE — PROGRESS NOTES
INTERNAL MEDICINE OFFICE VISIT  [facility]  [address] [place] [address]    NAME: Dimas Rider  AGE: 47 y o  SEX: female    DATE OF ENCOUNTER: 3/9/2023    Assessment and Plan     1  Anxiety with depression  2  Blurry vision, bilateral    Was started recently on Zoloft 25 mg QD on 02/21 , increased to 50 mg on 02/28 for anxiety  Presenting with concern about medication side effect, blurry vision, see HPI    - Comprehensive metabolic panel; Future  - Continue current Zoloft dose, blurriness is common transient & self limited side effect of SSRI when start  - follow up with Dr Sybil Godinez in 4-6 weeks to revaluate    3  Insomnia  4  Caffeine use     Drinks 3-4 cups of coffee daily, 2 morning and 2 afternoon   Sleeps ~ 6 hrs a night , yesterday went to bed 10 pm and woke up ~ 4:30 am while alarm was set at 6 am  Have not try melatonin , used it in past few times when traveling     Advised on sleep hygiene measures  Trial melatonin 5 or 6 mg HS   Decrease caffeine especially not in late afternoon     Orders Placed This Encounter   Procedures   • Comprehensive metabolic panel       - Counseling Documentation: patient was counseled regarding: diagnostic results    Chief Complaint     Chief Complaint   Patient presents with   • Medications     discuss new medication she started, she is having blurred vision  Patient started 2 weeks ago zoloft 25mg then was increased to 50 mg  Noticed double vision watching tv  Patient is very tiered   Taking at lunch time now trying to take towards dinner or later  • Anxiety     And Depression       History of Present Illness     Dimas Rider is a pleasant 47 F w PMH of anxiety / depression here for a concern that the new medication, Zoloft causing blurry vision   Explained transient and expected to improve gradually patient assured and to follow for revaluation    Also was concerned if it will affect her weight, gain; explained that studies on zoloft use showed minimal ~ 1% weight gain after 1-2 years of use, and some other studies showed minimal weight loss with short term use, so overall minimal if any weight gain side effect  HPI       She uses prescription glasses , had new vision exam and new glasses few weeks before the Zoloft with clear vision and no blurriness till started the medication now noted some blurriness, however mild and able to do work and activities, but was concerned if the medicine causing it and would it resolve or not  Also complained of insomnia, see above A&P  Patient has more anxiety than depression, some family stressors , conflict with the son, but overall mood is "okay" denies any SI , and explained SSRI may take 4-6 weeks to show effect  The following portions of the patient's history were reviewed and updated as appropriate: allergies, current medications, past family history, past medical history, past social history, past surgical history and problem list     Review of Systems     Review of Systems  - GENERAL: Negative for any nausea, vomiting, fevers, chills, or weight loss  - HEENT: blurry vision ; Negative for any head/Neck trauma, pain, sinusitis, rhinitis, nose bleeding   - CARDIAC: Negative for any chest pain, palpitation, Dyspnea on exertion, peripheral edema  - PULMONARY: Negative for any SOB, cough, wheezing    - GASTROINTESTINAL: Negative for any abdominal pain, N/V/D/C, blood in stool    - GENITOURINARY: Negative for any dysuria, hematuria, incontinence   - NEUROLOGIC: Negative for any muscle weakness, numbness/tingling, memory changes  - MUSCULOSKELETAL: Negative for any joint pains/swelling, limited ROM  - INTEGUMENTARY: Negative for any rashes, cuts/ lesions   - HEMATOLOGIC: Negative for any abnormal bruising, frequent infections or bleeding    -PSYCH: positive for Anxiety and insomnia; denies SI     Active Problem List     Patient Active Problem List   Diagnosis   • Anxiety with depression   • Bilateral carpal tunnel syndrome   • Lumbar disc herniation with radiculopathy   • Spinal stenosis of lumbar region   • Reactive airway disease with acute exacerbation   • Blurry vision, bilateral       Objective     /68 (BP Location: Left arm, Patient Position: Sitting, Cuff Size: Adult)   Pulse 79   Temp 98 4 °F (36 9 °C) (Temporal)   Ht 5' 4" (1 626 m)   Wt 71 2 kg (157 lb)   SpO2 98%   BMI 26 95 kg/m²     Physical Exam  - GEN: Appears well, alert and oriented x 3, pleasant and cooperative, in no acute distress  - HEENT: wears glasses, intact visual field; Anicteric, mucous membranes moist, PERRL and EOMI   - NECK: No lymphadenopathy, JVD or carotid bruits   - HEART: RRR, normal S1 and S2, no murmurs, clicks, gallops or rubs   - LUNGS: Clear to auscultation bilaterally; no wheezes, rales, or rhonchi  - ABDOMEN: Normal bowel sounds, soft, no tenderness, no distention, no organomegaly or masses felt on exam    - EXTREMITIES: Peripheral pulses normal; no clubbing, cyanosis, or edema  - NEURO: No focal findings, CN II-XII are grossly intact  - Musculoskeletal: 5/5 strength, normal ROM, no swollen or erythematous joints  - SKIN: Normal without suspicious lesions on exposed skin  - PSYCH: appropriate affect, communicate well, good eye contact, normal behavior       Pertinent Laboratory/Diagnostic Studies:  CBC:   Lab Results   Component Value Date/Time    WBC 7 03 11/06/2020 09:54 AM    RBC 4 45 11/06/2020 09:54 AM    HGB 13 4 11/06/2020 09:54 AM    HCT 41 7 11/06/2020 09:54 AM    MCV 94 11/06/2020 09:54 AM    MCH 30 1 11/06/2020 09:54 AM    MCHC 32 1 11/06/2020 09:54 AM    RDW 13 2 11/06/2020 09:54 AM    MPV 9 7 11/06/2020 09:54 AM     11/06/2020 09:54 AM    NRBC 0 11/06/2020 09:54 AM    NEUTOPHILPCT 67 11/06/2020 09:54 AM    LYMPHOPCT 26 11/06/2020 09:54 AM    MONOPCT 6 11/06/2020 09:54 AM    EOSPCT 1 11/06/2020 09:54 AM    BASOPCT 0 11/06/2020 09:54 AM    NEUTROABS 4 63 11/06/2020 09:54 AM    LYMPHSABS 1 82 11/06/2020 09:54 AM    MONOSABS 0 44 11/06/2020 09:54 AM    EOSABS 0 10 11/06/2020 09:54 AM     Chemistry Profile:   Lab Results   Component Value Date/Time    K 3 8 11/06/2020 09:54 AM     11/06/2020 09:54 AM    CO2 26 11/06/2020 09:54 AM    BUN 10 11/06/2020 09:54 AM    CREATININE 0 62 11/06/2020 09:54 AM    GLUF 98 11/06/2020 09:54 AM    CALCIUM 9 1 11/06/2020 09:54 AM    AST 20 11/06/2020 09:54 AM    ALT 32 11/06/2020 09:54 AM    ALKPHOS 71 11/06/2020 09:54 AM    EGFR 105 11/06/2020 09:54 AM       Current Medications     Current Outpatient Medications:   •  albuterol (Ventolin HFA) 90 mcg/act inhaler, Inhale 2 puffs every 6 (six) hours as needed for wheezing, Disp: 18 g, Rfl: 5  •  cyclobenzaprine (FLEXERIL) 5 mg tablet, Take 1 tablet (5 mg total) by mouth 3 (three) times a day as needed for muscle spasms for up to 7 days, Disp: 20 tablet, Rfl: 0  •  ibuprofen (MOTRIN) 200 mg tablet, Take 400 mg by mouth every 6 (six) hours as needed for mild pain, Disp: , Rfl:   •  sertraline (Zoloft) 50 mg tablet, Take 1 tablet (50 mg total) by mouth daily, Disp: 30 tablet, Rfl: 1  •  sertraline (Zoloft) 25 mg tablet, Take 1 tablet (25 mg total) by mouth daily for 7 days, Disp: 7 tablet, Rfl: 0    Health Maintenance     Health Maintenance   Topic Date Due   • Pneumococcal Vaccine: Pediatrics (0 to 5 Years) and At-Risk Patients (6 to 64 Years) (1 - PCV) Never done   • COVID-19 Vaccine (4 - Booster for Moderna series) 02/12/2022   • DTaP,Tdap,and Td Vaccines (2 - Td or Tdap) 07/17/2022   • BMI: Followup Plan  06/29/2023   • Annual Physical  06/29/2023   • Breast Cancer Screening: Mammogram  08/10/2023   • Cervical Cancer Screening  12/08/2023   • BMI: Adult  03/09/2024   • Colorectal Cancer Screening  12/09/2029   • HIV Screening  Completed   • Hepatitis C Screening  Completed   • Influenza Vaccine  Completed   • HIB Vaccine  Aged Out   • IPV Vaccine  Aged Out   • Hepatitis A Vaccine Aged Out   • Meningococcal ACWY Vaccine  Aged Out   • HPV Vaccine  Aged Out     Immunization History   Administered Date(s) Administered   • COVID-19 MODERNA VACC 0 5 ML IM 03/11/2021, 04/08/2021, 12/18/2021   • INFLUENZA 10/05/2020, 11/16/2021   • Influenza, injectable, quadrivalent, preservative free 0 5 mL 10/05/2020   • Influenza, seasonal, injectable 09/30/2015   • Tdap 07/17/2012       Mariaelena Davila DO   Internal Medicine - PGY3  [facility]    3/9/2023 11:30 AM

## 2023-04-18 PROBLEM — J45.20 MILD INTERMITTENT ASTHMA WITHOUT COMPLICATION: Status: ACTIVE | Noted: 2023-04-18

## 2023-04-18 PROBLEM — M51.16 LUMBAR DISC HERNIATION WITH RADICULOPATHY: Status: RESOLVED | Noted: 2022-02-04 | Resolved: 2023-04-18

## 2023-04-18 PROBLEM — G56.03 BILATERAL CARPAL TUNNEL SYNDROME: Status: RESOLVED | Noted: 2020-11-03 | Resolved: 2023-04-18

## 2023-05-23 ENCOUNTER — TELEPHONE (OUTPATIENT)
Dept: INTERNAL MEDICINE CLINIC | Age: 53
End: 2023-05-23

## 2023-05-23 DIAGNOSIS — T75.3XXA MOTION SICKNESS, INITIAL ENCOUNTER: Primary | ICD-10-CM

## 2023-05-23 RX ORDER — SCOLOPAMINE TRANSDERMAL SYSTEM 1 MG/1
1 PATCH, EXTENDED RELEASE TRANSDERMAL
Qty: 5 PATCH | Refills: 0 | Status: SHIPPED | OUTPATIENT
Start: 2023-05-23

## 2023-05-23 NOTE — TELEPHONE ENCOUNTER
Patient is going on the cruise in June  Patient would like to get the sea sick patch prescribed for her before she goes  She states that she was given a name from her brother    Transcopolamine patch    Patient uses [facility] in [facility] pass    Please call the patient when sent

## 2023-06-26 ENCOUNTER — AMB VIDEO VISIT (OUTPATIENT)
Dept: OTHER | Facility: HOSPITAL | Age: 53
End: 2023-06-26

## 2023-06-26 VITALS — RESPIRATION RATE: 16 BRPM

## 2023-06-26 DIAGNOSIS — J01.90 ACUTE SINUSITIS, RECURRENCE NOT SPECIFIED, UNSPECIFIED LOCATION: Primary | ICD-10-CM

## 2023-06-26 PROCEDURE — ECARE PR SL URGENT CARE VIRTUAL VISIT: Performed by: NURSE PRACTITIONER

## 2023-06-26 RX ORDER — AMOXICILLIN 875 MG/1
875 TABLET, COATED ORAL 2 TIMES DAILY
Qty: 20 TABLET | Refills: 0 | Status: SHIPPED | OUTPATIENT
Start: 2023-06-26 | End: 2023-07-06

## 2023-06-26 NOTE — LETTER
June 26, 2023     Patient: Maggy Patel   YOB: 1970   Date of Visit: 6/26/2023       To Whom it May Concern:    Maggy Patel is under my professional care  She was seen virtually on 6/26/2023  She may return to work on 06/28/2023    If you have any questions or concerns, please don't hesitate to call           Sincerely,          NERIS Faustin        CC: No Recipients

## 2023-06-26 NOTE — PROGRESS NOTES
Video Visit - Anirudh Frey 47 y o  female MRN: [id number]    REQUIRED DOCUMENTATION:         1  This service was provided via [facility]  2  Provider located at [address] [place] [id number] [phone]  3  [facility] provider: NERIS Roldan  4  Identify all parties in room with patient during [facility] visit:  patient   5  After connecting through [employer], patient was identified by name and date of birth  Patient was then informed that this was a Telemedicine visit and that the exam was being conducted confidentially over secure lines  My office door was closed  No one else was in the room  Patient acknowledged consent and understanding of privacy and security of the Telemedicine visit  I informed the patient that I have reviewed their record in Epic and presented the opportunity for them to ask any questions regarding the visit today  The patient agreed to participate  This is a 47year old female here today for video visit  She states symptoms started last week  She has had stuffy nose, loss of voice, post nasal drips  She is now having sinus pressure and congestion  She is having yellow and green nasal discharge  She has not checked her temp but has been having some night sweats  She denies any chest pain or sob  Review of Systems   Constitutional: Positive for fatigue  Negative for activity change, chills and fever  HENT: Positive for congestion, rhinorrhea, sinus pressure and sinus pain  Respiratory: Positive for cough  Cardiovascular: Negative  Skin: Negative  Neurological: Negative  Psychiatric/Behavioral: Negative  Vitals:    06/26/23 1216   Resp: 16     Physical Exam  Constitutional:       General: She is not in acute distress  Appearance: Normal appearance  She is not ill-appearing or toxic-appearing  HENT:      Head: Normocephalic and atraumatic  Nose: Congestion present     Pulmonary:      Effort: Pulmonary effort is normal  No respiratory distress  Neurological:      Mental Status: She is alert and oriented to person, place, and time  Psychiatric:         Mood and Affect: Mood normal          Behavior: Behavior normal          Thought Content: Thought content normal          Judgment: Judgment normal        Diagnoses and all orders for this visit:    Acute sinusitis, recurrence not specified, unspecified location  -     amoxicillin (AMOXIL) 875 mg tablet; Take 1 tablet (875 mg total) by mouth 2 (two) times a day for 10 days      Patient Instructions   Start antibiotic  Take probiotic  OTC cough and cold as needed  Nasal saline flushes or abeba pot  Follow up with PCP in no improvement  Go to ER with any worsening symptoms  Follow up with PCP if not improved, if symptoms are worse, go to the ER

## 2023-06-26 NOTE — PATIENT INSTRUCTIONS
Start antibiotic  Take probiotic  OTC cough and cold as needed  Nasal saline flushes or abeba pot  Follow up with PCP in no improvement  Go to ER with any worsening symptoms

## 2023-06-26 NOTE — CARE ANYWHERE EVISITS
Visit Summary for Derrick LAMBERT - Gender: Female - Date of Birth: [id number]  Date: [id number] - Duration: 5 minutes  Patient: Derrick LAMBERT  Provider: Luis Alberto CORDON    Patient Contact Information  Address  [place] [address] [place], [place] [id number]  [phone]    Visit Topics  Cold [Added By: Self - 2023-06-26]    Triage Questions   What is your current physical address in the event of a medical emergency? Answer []  Are you allergic to any medications? Answer []  Are you now or could you be pregnant? Answer []  Do you have any immune system compromise or chronic lung   disease? Answer []  Do you have any vulnerable family members in the home (infant, pregnant, cancer, elderly)? Answer []     Conversation Transcripts  [0A][0A] [Notification] You are connected with Shashank Burciaga, Urgent Care Specialist [0A][Notification] Mini White is located in [place]  [0A][Notification] Mini White has shared health history  Kati Lima  [0A]    Diagnosis  Acute pansinusitis    Procedures  Value: [id number] Code: CPT-4 UNLISTED E&M SERVICE    Medications Prescribed    No prescriptions ordered    Electronically signed by: Shashank Smith(NPI [id number])

## 2023-08-19 DIAGNOSIS — F41.8 ANXIETY WITH DEPRESSION: ICD-10-CM

## 2023-08-21 DIAGNOSIS — F41.8 ANXIETY WITH DEPRESSION: ICD-10-CM

## 2023-10-05 ENCOUNTER — OFFICE VISIT (OUTPATIENT)
Dept: INTERNAL MEDICINE CLINIC | Age: 53
End: 2023-10-05
Payer: COMMERCIAL

## 2023-10-05 VITALS
TEMPERATURE: 98.1 F | OXYGEN SATURATION: 99 % | HEART RATE: 75 BPM | BODY MASS INDEX: 25.44 KG/M2 | SYSTOLIC BLOOD PRESSURE: 102 MMHG | DIASTOLIC BLOOD PRESSURE: 70 MMHG | WEIGHT: 149 LBS | HEIGHT: 64 IN

## 2023-10-05 DIAGNOSIS — R07.89 CHEST TIGHTNESS: ICD-10-CM

## 2023-10-05 DIAGNOSIS — F41.8 ANXIETY WITH DEPRESSION: Primary | ICD-10-CM

## 2023-10-05 DIAGNOSIS — J45.20 MILD INTERMITTENT ASTHMA WITHOUT COMPLICATION: ICD-10-CM

## 2023-10-05 DIAGNOSIS — E78.00 HYPERCHOLESTEREMIA: ICD-10-CM

## 2023-10-05 PROCEDURE — 99214 OFFICE O/P EST MOD 30 MIN: CPT | Performed by: INTERNAL MEDICINE

## 2023-10-05 PROCEDURE — 93000 ELECTROCARDIOGRAM COMPLETE: CPT | Performed by: INTERNAL MEDICINE

## 2023-10-05 NOTE — PROGRESS NOTES
Assessment/Plan:     Diagnoses and all orders for this visit:    Anxiety with depression  -     TSH, 3rd generation with Free T4 reflex; Future  -     UA w Reflex to Microscopic w Reflex to Culture    Mild intermittent asthma without complication  -     CBC and differential; Future    Hypercholesteremia  -     Comprehensive metabolic panel; Future  -     Lipid panel; Future  -     TSH, 3rd generation with Free T4 reflex; Future             M*beModel software was used to dictate this note. It may contain errors with dictating incorrect words or incorrect spelling. Please contact the provider directly with any questions. Subjective:   Chief Complaint   Patient presents with   • Physical Exam   • Follow-up   •      MAMMOOGRAM- Patient goes every 6 months 8/29        Patient ID: Toby Isaac is a 46 y.o. female. HPI  This is a very pleasant 46 years young lady who is here today for the regular follow-up he is doing very well no new complaints except for episode of chest pain right in the middle of the chest she took some Tums and also tried to vomit after half an hour the symptoms resolved there was no heartburn there was no radiation of the pain.   There was no shortness of breath or diaphoresis she is a very active lady with lot of cardiovascular exercises she never had any chest pain during her exercises EKG done today as a baseline and it was normal will continue to follow there is no family history of premature cardiovascular problem she does not to smoke and her risk factors are only hypercholesterolemia but her HDL cholesterol is significantly elevated  Anxiety and depression is very well controlled  Asthma is very well controlled  The following portions of the patient's history were reviewed and updated as appropriate: allergies, current medications, past family history, past medical history, past social history, past surgical history and problem list.    Review of Systems   Constitutional: Negative for chills and fatigue. HENT: Negative for congestion, ear pain, hearing loss, postnasal drip, sinus pressure, sore throat and voice change. Eyes: Negative for pain, discharge and visual disturbance. Respiratory: Positive for chest tightness. Negative for cough and shortness of breath. Cardiovascular: Negative for chest pain, palpitations and leg swelling. Gastrointestinal: Negative for abdominal pain, blood in stool, diarrhea, nausea and rectal pain. Genitourinary: Negative for difficulty urinating, dysuria and urgency. Musculoskeletal: Negative for arthralgias and joint swelling. Skin: Negative for rash. Allergic/Immunologic: Negative for environmental allergies and food allergies. Neurological: Negative for dizziness, tremors, weakness, numbness and headaches. Hematological: Negative for adenopathy. Psychiatric/Behavioral: Negative for behavioral problems and hallucinations. The patient is nervous/anxious (Anxiety and depression is much better).           Past Medical History:   Diagnosis Date   • Allergic 2019    Supha drugs   • Anxiety    • Carpal tunnel syndrome     right wrist   • Chronic back pain    • Depression    • Obesity (BMI 30.0-34.9) 11/03/2020         Current Outpatient Medications:   •  albuterol (Ventolin HFA) 90 mcg/act inhaler, Inhale 2 puffs every 6 (six) hours as needed for wheezing, Disp: 18 g, Rfl: 5  •  cyclobenzaprine (FLEXERIL) 5 mg tablet, Take 1 tablet (5 mg total) by mouth 3 (three) times a day as needed for muscle spasms for up to 7 days, Disp: 20 tablet, Rfl: 0  •  ibuprofen (MOTRIN) 200 mg tablet, Take 400 mg by mouth every 6 (six) hours as needed for mild pain, Disp: , Rfl:   •  sertraline (Zoloft) 50 mg tablet, Take 1 tablet (50 mg total) by mouth daily, Disp: 90 tablet, Rfl: 0    Allergies   Allergen Reactions   • Bactrim [Sulfamethoxazole-Trimethoprim] Hives       Social History   Past Surgical History:   Procedure Laterality Date   • CARPAL TUNNEL RELEASE Left 10/2021   • NO PAST SURGERIES       Family History   Problem Relation Age of Onset   • Hypertension Mother    • Vision loss Mother         UNSURE WHY   • Macular degeneration Mother    • Alcohol abuse Father    • Mental illness Father    • Schizophrenia Father    • Breast cancer Maternal Aunt        Objective:  /70 (BP Location: Left arm, Patient Position: Sitting, Cuff Size: Standard)   Pulse 75   Temp 98.1 °F (36.7 °C) (Temporal)   Ht 5' 4" (1.626 m)   Wt 67.6 kg (149 lb)   SpO2 99%   BMI 25.58 kg/m²        Physical Exam  Vitals and nursing note reviewed. Constitutional:       Appearance: She is well-developed. HENT:      Right Ear: External ear normal.   Eyes:      Conjunctiva/sclera: Conjunctivae normal.      Pupils: Pupils are equal, round, and reactive to light. Neck:      Thyroid: No thyromegaly. Vascular: No JVD. Cardiovascular:      Rate and Rhythm: Normal rate and regular rhythm. Heart sounds: Normal heart sounds. Pulmonary:      Breath sounds: Normal breath sounds. Abdominal:      General: Bowel sounds are normal.      Palpations: Abdomen is soft. Musculoskeletal:         General: Normal range of motion. Cervical back: Normal range of motion. Lymphadenopathy:      Cervical: No cervical adenopathy. Skin:     General: Skin is dry. Neurological:      Mental Status: She is alert and oriented to person, place, and time. Deep Tendon Reflexes: Reflexes are normal and symmetric.    Psychiatric:         Behavior: Behavior normal.

## 2023-11-21 ENCOUNTER — OFFICE VISIT (OUTPATIENT)
Dept: INTERNAL MEDICINE CLINIC | Facility: OTHER | Age: 53
End: 2023-11-21
Payer: COMMERCIAL

## 2023-11-21 VITALS
TEMPERATURE: 98.1 F | WEIGHT: 155.2 LBS | BODY MASS INDEX: 26.5 KG/M2 | SYSTOLIC BLOOD PRESSURE: 110 MMHG | HEART RATE: 61 BPM | DIASTOLIC BLOOD PRESSURE: 78 MMHG | RESPIRATION RATE: 20 BRPM | HEIGHT: 64 IN | OXYGEN SATURATION: 98 %

## 2023-11-21 DIAGNOSIS — M77.12 LATERAL EPICONDYLITIS, LEFT ELBOW: ICD-10-CM

## 2023-11-21 DIAGNOSIS — B02.9 HERPES ZOSTER WITHOUT COMPLICATION: Primary | ICD-10-CM

## 2023-11-21 PROCEDURE — 99213 OFFICE O/P EST LOW 20 MIN: CPT | Performed by: INTERNAL MEDICINE

## 2023-11-21 RX ORDER — CLOBETASOL PROPIONATE 0.5 MG/G
CREAM TOPICAL 2 TIMES DAILY PRN
Qty: 30 G | Refills: 0 | Status: SHIPPED | OUTPATIENT
Start: 2023-11-21

## 2023-11-21 RX ORDER — VALACYCLOVIR HYDROCHLORIDE 1 G/1
1000 TABLET, FILM COATED ORAL 3 TIMES DAILY
Qty: 21 TABLET | Refills: 0 | Status: SHIPPED | OUTPATIENT
Start: 2023-11-21 | End: 2023-11-28

## 2023-11-21 NOTE — ASSESSMENT & PLAN NOTE
We will start Valtrex 1 g 3 times a day for 1 week. We will also prescribe clobetasol for symptom relief. She is to contact our office for worsening symptoms. Discussed contact cautions.

## 2023-11-21 NOTE — ASSESSMENT & PLAN NOTE
Discussed use of over-the-counter Tylenol/NSAIDs as needed for pain and inflammation. Recommend that she use a compression sleeve to the left elbow. She is to contact our office for worsening symptoms.

## 2023-11-21 NOTE — PROGRESS NOTES
Assessment/Plan:    Herpes zoster without complication  We will start Valtrex 1 g 3 times a day for 1 week. We will also prescribe clobetasol for symptom relief. She is to contact our office for worsening symptoms. Discussed contact cautions. Lateral epicondylitis, left elbow  Discussed use of over-the-counter Tylenol/NSAIDs as needed for pain and inflammation. Recommend that she use a compression sleeve to the left elbow. She is to contact our office for worsening symptoms. Diagnoses and all orders for this visit:    Herpes zoster without complication  -     valACYclovir (VALTREX) 1,000 mg tablet; Take 1 tablet (1,000 mg total) by mouth 3 (three) times a day for 7 days  -     clobetasol (TEMOVATE) 0.05 % cream; Apply topically 2 (two) times a day as needed (rash)    Lateral epicondylitis, left elbow                  Subjective:      Patient ID: Phil Flores is a 46 y.o. female. Chief Complaint   Patient presents with   • Rash     rash on stomach/torso area. Had shingles a few years ago , she thinks it may be a shingles flare up. Started a few days ago. Feels like stabbing needles with small red spots    • Flu Vaccine     Would like if possible   • Elbow Pain     Left. No injury noted    • DOMINIK     Seeing Dr for carpel tunnel of right hand       46year old female is sen today with concern for a rash localized on her right abdomen to her back. Rash presented 4 days ago. She had COVID 13 days ago. She has a history of shingles, approximately 5 years ago. She has also been experiencing left elbow epicondylitis. She exercises regularly at the gym. She denies any recent injury. Rash  Pertinent negatives include no congestion, cough, diarrhea, fatigue, fever, rhinorrhea, shortness of breath, sore throat or vomiting. Elbow Pain  Associated symptoms include a rash.  Pertinent negatives include no abdominal pain, chest pain, chills, congestion, coughing, diaphoresis, fatigue, fever, headaches, nausea, numbness, sore throat, vomiting or weakness. The following portions of the patient's history were reviewed and updated as appropriate: allergies, current medications, past family history, past medical history, past social history, past surgical history, and problem list.    Review of Systems   Constitutional:  Negative for activity change, appetite change, chills, diaphoresis, fatigue and fever. HENT:  Negative for congestion, postnasal drip, rhinorrhea, sinus pressure, sinus pain, sneezing and sore throat. Eyes:  Negative for visual disturbance. Respiratory:  Negative for apnea, cough, choking, chest tightness, shortness of breath and wheezing. Cardiovascular:  Negative for chest pain, palpitations and leg swelling. Gastrointestinal:  Negative for abdominal distention, abdominal pain, anal bleeding, blood in stool, constipation, diarrhea, nausea and vomiting. Endocrine: Negative for cold intolerance and heat intolerance. Genitourinary:  Negative for difficulty urinating, dysuria and hematuria. Musculoskeletal: Negative. Skin:  Positive for rash. Neurological:  Negative for dizziness, weakness, light-headedness, numbness and headaches. Hematological:  Negative for adenopathy. Psychiatric/Behavioral:  Negative for agitation, sleep disturbance and suicidal ideas. All other systems reviewed and are negative.         Past Medical History:   Diagnosis Date   • Allergic 2019    Supha drugs   • Anxiety    • Carpal tunnel syndrome     right wrist   • Chronic back pain    • Depression    • Obesity (BMI 30.0-34.9) 11/03/2020         Current Outpatient Medications:   •  albuterol (Ventolin HFA) 90 mcg/act inhaler, Inhale 2 puffs every 6 (six) hours as needed for wheezing, Disp: 18 g, Rfl: 5  •  clobetasol (TEMOVATE) 0.05 % cream, Apply topically 2 (two) times a day as needed (rash), Disp: 30 g, Rfl: 0  •  cyclobenzaprine (FLEXERIL) 5 mg tablet, Take 1 tablet (5 mg total) by mouth 3 (three) times a day as needed for muscle spasms for up to 7 days, Disp: 20 tablet, Rfl: 0  •  ibuprofen (MOTRIN) 200 mg tablet, Take 400 mg by mouth every 6 (six) hours as needed for mild pain, Disp: , Rfl:   •  sertraline (Zoloft) 50 mg tablet, Take 1 tablet (50 mg total) by mouth daily, Disp: 90 tablet, Rfl: 1  •  valACYclovir (VALTREX) 1,000 mg tablet, Take 1 tablet (1,000 mg total) by mouth 3 (three) times a day for 7 days, Disp: 21 tablet, Rfl: 0    Allergies   Allergen Reactions   • Bactrim [Sulfamethoxazole-Trimethoprim] Hives       Social History   Past Surgical History:   Procedure Laterality Date   • CARPAL TUNNEL RELEASE Left 10/2021   • NO PAST SURGERIES       Family History   Problem Relation Age of Onset   • Hypertension Mother    • Vision loss Mother         UNSURE WHY   • Macular degeneration Mother    • Alcohol abuse Father    • Mental illness Father    • Schizophrenia Father    • Breast cancer Maternal Aunt        Objective:  /78 (BP Location: Left arm, Patient Position: Sitting, Cuff Size: Standard)   Pulse 61   Temp 98.1 °F (36.7 °C) (Temporal)   Resp 20   Ht 5' 4" (1.626 m)   Wt 70.4 kg (155 lb 3.2 oz)   SpO2 98%   BMI 26.64 kg/m²     No results found for this or any previous visit (from the past 1344 hour(s)). Physical Exam  Vitals and nursing note reviewed. Constitutional:       General: She is not in acute distress. Appearance: She is well-developed. She is not diaphoretic. HENT:      Head: Normocephalic and atraumatic. Eyes:      General:         Right eye: No discharge. Left eye: No discharge. Conjunctiva/sclera: Conjunctivae normal.      Pupils: Pupils are equal, round, and reactive to light. Neck:      Thyroid: No thyromegaly. Vascular: No JVD. Cardiovascular:      Rate and Rhythm: Normal rate and regular rhythm. Heart sounds: Normal heart sounds. No murmur heard. No friction rub. No gallop.    Pulmonary:      Effort: Pulmonary effort is normal. No respiratory distress. Breath sounds: Normal breath sounds. No wheezing or rales. Chest:      Chest wall: No tenderness. Abdominal:      General: There is no distension. Palpations: Abdomen is soft. Tenderness: There is no abdominal tenderness. Musculoskeletal:         General: No tenderness or deformity. Normal range of motion. Cervical back: Normal range of motion and neck supple. Lymphadenopathy:      Cervical: No cervical adenopathy. Skin:     General: Skin is warm and dry. Coloration: Skin is not pale. Findings: No erythema or rash. Neurological:      Mental Status: She is alert and oriented to person, place, and time. Cranial Nerves: No cranial nerve deficit. Coordination: Coordination normal.   Psychiatric:         Behavior: Behavior normal.         Thought Content:  Thought content normal.         Judgment: Judgment normal.

## 2023-12-06 ENCOUNTER — AMB VIDEO VISIT (OUTPATIENT)
Dept: OTHER | Facility: HOSPITAL | Age: 53
End: 2023-12-06

## 2023-12-06 DIAGNOSIS — J01.00 ACUTE NON-RECURRENT MAXILLARY SINUSITIS: Primary | ICD-10-CM

## 2023-12-06 PROCEDURE — ECARE PR SL URGENT CARE VIRTUAL VISIT: Performed by: PHYSICIAN ASSISTANT

## 2023-12-06 RX ORDER — AMOXICILLIN AND CLAVULANATE POTASSIUM 875; 125 MG/1; MG/1
1 TABLET, FILM COATED ORAL EVERY 12 HOURS SCHEDULED
Qty: 20 TABLET | Refills: 0 | Status: SHIPPED | OUTPATIENT
Start: 2023-12-06 | End: 2023-12-16

## 2023-12-06 NOTE — PROGRESS NOTES
Required Documentation:  Encounter provider Maddison Ramos PA-C    Provider located at hospitals [id number]    Identify all parties in room with patient during virtual visit:  No one else    The patient was identified by name and date of birth. Elliot Glass was informed that this is a telemedicine visit and that the visit is being conducted through the [address] [place] [address] Dr platform. She agrees to proceed. .  My office door was closed. No one else was in the room. She acknowledged consent and understanding of privacy and security of the video platform. The patient has agreed to participate and understands they can discontinue the visit at any time. Verification of patient location:    Patient is located at home in the following state in which I hold an active license [place]    Patient is aware this is a billable service. Reason for visit is No chief complaint on file. Subjective  HPI   Patient states that she has a cold, stuffy nose, PND. This morning the mucus was yellow. She denies any fevers, SOB, CP. She had covid a month ago. She has been sick for a few days. It is getting worse since Sunday. She is having sinus pain and pressure. She has a dry cough. She has tried nyquil/ advil with minimal relief. She use to get sinus infections years ago but she has been good for a few weeks. This feels similar to her sinus infection. Past Medical History:   Diagnosis Date    Allergic 2019    Supha drugs    Anxiety     Carpal tunnel syndrome     right wrist    Chronic back pain     Depression     Obesity (BMI 30.0-34.9) 11/03/2020       Past Surgical History:   Procedure Laterality Date    CARPAL TUNNEL RELEASE Left 10/2021    NO PAST SURGERIES          Allergies   Allergen Reactions    Bactrim [Sulfamethoxazole-Trimethoprim] Hives       Review of Systems   Constitutional: Negative.     HENT:  Positive for congestion and postnasal drip. Respiratory:  Positive for cough. Gastrointestinal: Negative. Musculoskeletal: Negative. Neurological:  Positive for headaches. Psychiatric/Behavioral: Negative. Video Exam    There were no vitals filed for this visit. Physical Exam  Constitutional:       General: She is not in acute distress. Appearance: Normal appearance. She is not ill-appearing, toxic-appearing or diaphoretic. HENT:      Head: Normocephalic and atraumatic. Nose: Congestion present. Comments: TTP over sinuses  Pulmonary:      Effort: Pulmonary effort is normal.   Lymphadenopathy:      Cervical: No cervical adenopathy. Skin:     General: Skin is dry. Neurological:      General: No focal deficit present. Mental Status: She is alert and oriented to person, place, and time. Psychiatric:         Mood and Affect: Mood normal.         Behavior: Behavior normal.         Visit Time  Total Visit Duration: 10 minutes    Assessment/Plan:    Diagnoses and all orders for this visit:    Acute non-recurrent maxillary sinusitis  -     amoxicillin-clavulanate (AUGMENTIN) 875-125 mg per tablet; Take 1 tablet by mouth every 12 (twelve) hours for 10 days        Patient Instructions   Sinusitis   WHAT YOU NEED TO KNOW:   Sinusitis is inflammation or infection of your sinuses. Sinusitis is most often caused by a virus. Acute sinusitis may last up to 12 weeks. Chronic sinusitis lasts longer than 12 weeks. Recurrent sinusitis means you have 4 or more infections in 1 year. DISCHARGE INSTRUCTIONS:   Return to the emergency department if:   You have trouble breathing or wheezing that is getting worse. You have a stiff neck, a fever, or a bad headache. You cannot open your eye. Your eyeball bulges out or you cannot move your eye. You are more sleepy than normal, or you notice changes in your ability to think, move, or talk. You have swelling of your forehead or scalp.     Call your doctor if:   You have vision changes, such as double vision. Your eye and eyelid are red, swollen, and painful. Your symptoms do not improve or go away after 10 days. You have nausea and are vomiting. Your nose is bleeding. You have questions or concerns about your condition or care. Medicines: Your symptoms may go away on their own. Your healthcare provider may recommend watchful waiting for up to 10 days before starting antibiotics. You may need any of the following:  Acetaminophen  decreases pain and fever. It is available without a doctor's order. Ask how much to take and how often to take it. Follow directions. Read the labels of all other medicines you are using to see if they also contain acetaminophen, or ask your doctor or pharmacist. Acetaminophen can cause liver damage if not taken correctly. NSAIDs , such as ibuprofen, help decrease swelling, pain, and fever. This medicine is available with or without a doctor's order. NSAIDs can cause stomach bleeding or kidney problems in certain people. If you take blood thinner medicine, always ask your healthcare provider if NSAIDs are safe for you. Always read the medicine label and follow directions. Nasal steroid sprays  may help decrease inflammation in your nose and sinuses. Decongestants  help reduce swelling and drain mucus in the nose and sinuses. They may help you breathe easier. Antihistamines  help dry mucus in the nose and relieve sneezing. Antibiotics  help treat or prevent a bacterial infection. Take your medicine as directed. Contact your healthcare provider if you think your medicine is not helping or if you have side effects. Tell your provider if you are allergic to any medicine. Keep a list of the medicines, vitamins, and herbs you take. Include the amounts, and when and why you take them. Bring the list or the pill bottles to follow-up visits.  Carry your medicine list with you in case of an emergency. Self-care:   Rinse your sinuses as directed. Use a sinus rinse device to rinse your nasal passages with a saline (salt water) solution or distilled water. Do not use tap water. This will help thin the mucus in your nose and rinse away pollen and dirt. It will also help reduce swelling so you can breathe normally. Use a humidifier  to increase air moisture in your home. This may make it easier for you to breathe and help decrease your cough. Sleep with your head elevated. Place an extra pillow under your head before you go to sleep to help your sinuses drain. Drink liquids as directed. Ask your healthcare provider how much liquid to drink each day and which liquids are best for you. Liquids will thin the mucus in your nose and help it drain. Avoid drinks that contain alcohol or caffeine. Do not smoke, and avoid secondhand smoke. Nicotine and other chemicals in cigarettes and cigars can make your symptoms worse. Ask your healthcare provider for information if you currently smoke and need help to quit. E-cigarettes or smokeless tobacco still contain nicotine. Talk to your healthcare provider before you use these products. Prevent the spread of germs:   Wash your hands often with soap and water. Wash your hands after you use the bathroom, change a child's diaper, or sneeze. Wash your hands before you prepare or eat food. Stay away from people who are sick. Some germs spread easily and quickly through contact. Follow up with your doctor as directed: You may be referred to an ear, nose, and throat specialist. Write down your questions so you remember to ask them during your visits. © Copyright Loletta Gosselin 2023 Information is for End User's use only and may not be sold, redistributed or otherwise used for commercial purposes. The above information is an  only. It is not intended as medical advice for individual conditions or treatments.  Talk to your doctor, nurse or pharmacist before following any medical regimen to see if it is safe and effective for you.

## 2023-12-06 NOTE — CARE ANYWHERE EVISITS
Visit Summary for Wan Taylor Juan LAMBERT - Gender: Female - Date of Birth: [id number]  Date: [id number] - Duration: 6 minutes  Patient: Wan LAMBERT  Provider: Michael Gu PA-C    Patient Contact Information  Address  [address] [place] [address]  [phone]    Visit Topics  Cold [Added By: Self - 2023-12-06]    Triage Questions   What is your current physical address in the event of a medical emergency? Answer []  Are you allergic to any medications? Answer []  Are you now or could you be pregnant? Answer []  Do you have any immune system compromise or chronic lung   disease? Answer []  Do you have any vulnerable family members in the home (infant, pregnant, cancer, elderly)? Answer []     Conversation Transcripts  [0A][0A] [Notification] You are connected with Michael Gu PA-C, Urgent Care Specialist.[0A][Notification] John Carrillo is located in [place]. [0A][Notification] oJhn Carrillo has shared health history. Yeny Mariscal .[0A]    Diagnosis  Acute maxillary sinusitis, unspecified    Procedures  Value: [id number] Code: CPT-4 UNLISTED E&M SERVICE    Medications Prescribed    No prescriptions ordered    Electronically signed by: Erinn Keith(NPI [id number])

## 2023-12-06 NOTE — PATIENT INSTRUCTIONS
Sinusitis   WHAT YOU NEED TO KNOW:   Sinusitis is inflammation or infection of your sinuses. Sinusitis is most often caused by a virus. Acute sinusitis may last up to 12 weeks. Chronic sinusitis lasts longer than 12 weeks. Recurrent sinusitis means you have 4 or more infections in 1 year. DISCHARGE INSTRUCTIONS:   Return to the emergency department if:   You have trouble breathing or wheezing that is getting worse. You have a stiff neck, a fever, or a bad headache. You cannot open your eye. Your eyeball bulges out or you cannot move your eye. You are more sleepy than normal, or you notice changes in your ability to think, move, or talk. You have swelling of your forehead or scalp. Call your doctor if:   You have vision changes, such as double vision. Your eye and eyelid are red, swollen, and painful. Your symptoms do not improve or go away after 10 days. You have nausea and are vomiting. Your nose is bleeding. You have questions or concerns about your condition or care. Medicines: Your symptoms may go away on their own. Your healthcare provider may recommend watchful waiting for up to 10 days before starting antibiotics. You may need any of the following:  Acetaminophen  decreases pain and fever. It is available without a doctor's order. Ask how much to take and how often to take it. Follow directions. Read the labels of all other medicines you are using to see if they also contain acetaminophen, or ask your doctor or pharmacist. Acetaminophen can cause liver damage if not taken correctly. NSAIDs , such as ibuprofen, help decrease swelling, pain, and fever. This medicine is available with or without a doctor's order. NSAIDs can cause stomach bleeding or kidney problems in certain people. If you take blood thinner medicine, always ask your healthcare provider if NSAIDs are safe for you. Always read the medicine label and follow directions.     Nasal steroid sprays may help decrease inflammation in your nose and sinuses. Decongestants  help reduce swelling and drain mucus in the nose and sinuses. They may help you breathe easier. Antihistamines  help dry mucus in the nose and relieve sneezing. Antibiotics  help treat or prevent a bacterial infection. Take your medicine as directed. Contact your healthcare provider if you think your medicine is not helping or if you have side effects. Tell your provider if you are allergic to any medicine. Keep a list of the medicines, vitamins, and herbs you take. Include the amounts, and when and why you take them. Bring the list or the pill bottles to follow-up visits. Carry your medicine list with you in case of an emergency. Self-care:   Rinse your sinuses as directed. Use a sinus rinse device to rinse your nasal passages with a saline (salt water) solution or distilled water. Do not use tap water. This will help thin the mucus in your nose and rinse away pollen and dirt. It will also help reduce swelling so you can breathe normally. Use a humidifier  to increase air moisture in your home. This may make it easier for you to breathe and help decrease your cough. Sleep with your head elevated. Place an extra pillow under your head before you go to sleep to help your sinuses drain. Drink liquids as directed. Ask your healthcare provider how much liquid to drink each day and which liquids are best for you. Liquids will thin the mucus in your nose and help it drain. Avoid drinks that contain alcohol or caffeine. Do not smoke, and avoid secondhand smoke. Nicotine and other chemicals in cigarettes and cigars can make your symptoms worse. Ask your healthcare provider for information if you currently smoke and need help to quit. E-cigarettes or smokeless tobacco still contain nicotine. Talk to your healthcare provider before you use these products.     Prevent the spread of germs:   Wash your hands often with soap and water. Wash your hands after you use the bathroom, change a child's diaper, or sneeze. Wash your hands before you prepare or eat food. Stay away from people who are sick. Some germs spread easily and quickly through contact. Follow up with your doctor as directed: You may be referred to an ear, nose, and throat specialist. Write down your questions so you remember to ask them during your visits. © Copyright Leticiatoni Fleming 2023 Information is for End User's use only and may not be sold, redistributed or otherwise used for commercial purposes. The above information is an  only. It is not intended as medical advice for individual conditions or treatments. Talk to your doctor, nurse or pharmacist before following any medical regimen to see if it is safe and effective for you.

## 2024-01-03 DIAGNOSIS — J01.01 ACUTE RECURRENT MAXILLARY SINUSITIS: Primary | ICD-10-CM

## 2024-01-03 RX ORDER — AMOXICILLIN AND CLAVULANATE POTASSIUM 875; 125 MG/1; MG/1
1 TABLET, FILM COATED ORAL EVERY 12 HOURS SCHEDULED
Qty: 10 TABLET | Refills: 0 | Status: SHIPPED | OUTPATIENT
Start: 2024-01-03 | End: 2024-01-08

## 2024-04-08 DIAGNOSIS — F41.8 ANXIETY WITH DEPRESSION: ICD-10-CM

## 2024-04-08 NOTE — TELEPHONE ENCOUNTER
Patient called because she needs a refill   For Sertraline.  Patient has an appointment set up for May, will need refill before her appointment.   The appointment for May if the first one she could get with Dr. Katz.        LA: 11-21-23   NA:   5-22-24

## 2024-05-08 ENCOUNTER — OFFICE VISIT (OUTPATIENT)
Dept: INTERNAL MEDICINE CLINIC | Age: 54
End: 2024-05-08
Payer: COMMERCIAL

## 2024-05-08 VITALS
SYSTOLIC BLOOD PRESSURE: 120 MMHG | OXYGEN SATURATION: 97 % | DIASTOLIC BLOOD PRESSURE: 68 MMHG | TEMPERATURE: 98.1 F | BODY MASS INDEX: 27.49 KG/M2 | HEIGHT: 64 IN | WEIGHT: 161 LBS | HEART RATE: 73 BPM

## 2024-05-08 DIAGNOSIS — F41.8 ANXIETY WITH DEPRESSION: ICD-10-CM

## 2024-05-08 DIAGNOSIS — Z23 NEED FOR SHINGLES VACCINE: Primary | ICD-10-CM

## 2024-05-08 DIAGNOSIS — J45.20 MILD INTERMITTENT ASTHMA WITHOUT COMPLICATION: ICD-10-CM

## 2024-05-08 PROBLEM — M77.12 LATERAL EPICONDYLITIS, LEFT ELBOW: Status: RESOLVED | Noted: 2023-11-21 | Resolved: 2024-05-08

## 2024-05-08 PROBLEM — H53.8 BLURRY VISION, BILATERAL: Status: RESOLVED | Noted: 2023-03-09 | Resolved: 2024-05-08

## 2024-05-08 PROBLEM — B02.9 HERPES ZOSTER WITHOUT COMPLICATION: Status: RESOLVED | Noted: 2023-11-21 | Resolved: 2024-05-08

## 2024-05-08 PROCEDURE — 99214 OFFICE O/P EST MOD 30 MIN: CPT | Performed by: INTERNAL MEDICINE

## 2024-05-08 PROCEDURE — 90750 HZV VACC RECOMBINANT IM: CPT

## 2024-05-08 PROCEDURE — 90471 IMMUNIZATION ADMIN: CPT

## 2024-05-08 NOTE — PROGRESS NOTES
Assessment/Plan:     Diagnoses and all orders for this visit:    Mild intermittent asthma without complication  Asthma is much better controlled she is not using any inhalers right now  Anxiety with depression  Anxiety and depression is very well-controlled  Other orders  -     hydrocortisone 2.5 % ointment; APPLY A THIN LAYER TO RASH ON FACE 2 TIMES A DAY FOR 1 WEEK  THEN...  (REFER TO PRESCRIPTION NOTES).    Very much concerned about hot flashes was seen by the GYN she mentioned about the new medication veoza I will get the approval from her insurance company if they cover it and then we can prescribe her I will check the liver function and interaction once it is okayed by her insurance       M*Modal software was used to dictate this note.  It may contain errors with dictating incorrect words or incorrect spelling. Please contact the provider directly with any questions.    Subjective:   Chief Complaint   Patient presents with    Follow-up     Would like to discuss shingles vaccine she states she has had shingles x2          Depression screening,         Patient ID: Kylee De Paz is a 53 y.o. female.    HPI  Is a very pleasant 53 years young lady who is here today for the regular follow-up she does not have any complaints except for the hot flashes anxiety and depression is a stable no suicidal ideation she is taking her medications and she does not have any side effects of the medication    Asthma is very well-controlled she is not coughing she does not have any sputum production she is not using inhalers right now I we will continue with as needed albuterol inhaler and follow-up    Hot flashes she is post menopausal we will see if we can help her with the medication she is already on Zoloft which does not help  The following portions of the patient's history were reviewed and updated as appropriate: allergies, current medications, past family history, past medical history, past social history, past surgical  history, and problem list.    Review of Systems   Constitutional:  Negative for chills and fatigue.   HENT:  Negative for congestion, ear pain, hearing loss, postnasal drip, sinus pressure, sore throat and voice change.    Eyes:  Negative for pain, discharge and visual disturbance.   Respiratory:  Negative for cough, chest tightness and shortness of breath.    Cardiovascular:  Negative for chest pain, palpitations and leg swelling.   Gastrointestinal:  Negative for abdominal pain, blood in stool, diarrhea, nausea and rectal pain.   Endocrine:        Hot flashes was seen by the GYN   Genitourinary:  Negative for difficulty urinating, dysuria and urgency.   Musculoskeletal:  Negative for arthralgias and joint swelling.   Skin:  Negative for rash.   Allergic/Immunologic: Negative for environmental allergies and food allergies.   Neurological:  Negative for dizziness, tremors, weakness, numbness and headaches.   Hematological:  Negative for adenopathy.   Psychiatric/Behavioral:  Negative for behavioral problems and hallucinations.          Past Medical History:   Diagnosis Date    Allergic 2019    Supha drugs    Anxiety     Carpal tunnel syndrome     right wrist    Chronic back pain     Depression     Obesity (BMI 30.0-34.9) 11/03/2020         Current Outpatient Medications:     albuterol (Ventolin HFA) 90 mcg/act inhaler, Inhale 2 puffs every 6 (six) hours as needed for wheezing, Disp: 18 g, Rfl: 5    clobetasol (TEMOVATE) 0.05 % cream, Apply topically 2 (two) times a day as needed (rash), Disp: 30 g, Rfl: 0    cyclobenzaprine (FLEXERIL) 5 mg tablet, Take 1 tablet (5 mg total) by mouth 3 (three) times a day as needed for muscle spasms for up to 7 days, Disp: 20 tablet, Rfl: 0    hydrocortisone 2.5 % ointment, APPLY A THIN LAYER TO RASH ON FACE 2 TIMES A DAY FOR 1 WEEK  THEN...  (REFER TO PRESCRIPTION NOTES)., Disp: , Rfl:     ibuprofen (MOTRIN) 200 mg tablet, Take 400 mg by mouth every 6 (six) hours as needed for mild  "pain, Disp: , Rfl:     sertraline (Zoloft) 50 mg tablet, Take 1 tablet (50 mg total) by mouth daily, Disp: 90 tablet, Rfl: 1    valACYclovir (VALTREX) 1,000 mg tablet, Take 1 tablet (1,000 mg total) by mouth 3 (three) times a day for 7 days, Disp: 21 tablet, Rfl: 0    Allergies   Allergen Reactions    Bactrim [Sulfamethoxazole-Trimethoprim] Hives       Social History   Past Surgical History:   Procedure Laterality Date    CARPAL TUNNEL RELEASE Left 10/2021    NO PAST SURGERIES       Family History   Problem Relation Age of Onset    Hypertension Mother     Vision loss Mother         UNSURE WHY    Macular degeneration Mother     Alcohol abuse Father     Mental illness Father     Schizophrenia Father     Breast cancer Maternal Aunt        Objective:  /68 (BP Location: Left arm, Patient Position: Sitting, Cuff Size: Standard)   Pulse 73   Temp 98.1 °F (36.7 °C) (Temporal)   Ht 5' 4\" (1.626 m)   Wt 73 kg (161 lb)   SpO2 97%   BMI 27.64 kg/m²        Physical Exam  Constitutional:       Appearance: Normal appearance. She is well-developed.   HENT:      Head: Normocephalic and atraumatic.      Right Ear: Tympanic membrane and external ear normal.      Left Ear: Tympanic membrane normal.      Mouth/Throat:      Mouth: Mucous membranes are moist.   Eyes:      Conjunctiva/sclera: Conjunctivae normal.      Pupils: Pupils are equal, round, and reactive to light.   Neck:      Thyroid: No thyromegaly.      Vascular: No JVD.   Cardiovascular:      Rate and Rhythm: Normal rate and regular rhythm.      Heart sounds: Normal heart sounds.   Pulmonary:      Effort: Pulmonary effort is normal.      Breath sounds: Normal breath sounds.   Abdominal:      General: Bowel sounds are normal.      Palpations: Abdomen is soft.   Musculoskeletal:         General: Normal range of motion.      Cervical back: Normal range of motion and neck supple.   Lymphadenopathy:      Cervical: No cervical adenopathy.   Skin:     General: Skin is " dry.   Neurological:      General: No focal deficit present.      Mental Status: She is alert and oriented to person, place, and time. Mental status is at baseline.      Deep Tendon Reflexes: Reflexes are normal and symmetric.   Psychiatric:         Mood and Affect: Mood normal.         Behavior: Behavior normal.

## 2024-06-06 DIAGNOSIS — G89.29 ACUTE EXACERBATION OF CHRONIC LOW BACK PAIN: ICD-10-CM

## 2024-06-06 DIAGNOSIS — F41.8 ANXIETY WITH DEPRESSION: ICD-10-CM

## 2024-06-06 DIAGNOSIS — M51.16 LUMBAR DISC HERNIATION WITH RADICULOPATHY: ICD-10-CM

## 2024-06-06 DIAGNOSIS — M54.50 ACUTE EXACERBATION OF CHRONIC LOW BACK PAIN: ICD-10-CM

## 2024-06-06 NOTE — TELEPHONE ENCOUNTER
Reason for call:   [x] Refill   [] Prior Auth  [x] Other:     Office:   [] PCP/Provider -   [] Specialty/Provider -     Medication: cyclobenzaprine (FLEXERIL) 5 mg tablet Take 1 tablet (5 mg total) by mouth 3 (three) times a day as needed for muscle spasms for up to 7 days       Pharmacy: [id number] - [place] - [address] [place] [address]      Does the patient have enough for 3 days?   [] Yes   [x] No - Send as HP to POD

## 2024-06-07 RX ORDER — CYCLOBENZAPRINE HCL 5 MG
5 TABLET ORAL 3 TIMES DAILY PRN
Qty: 20 TABLET | Refills: 0 | Status: SHIPPED | OUTPATIENT
Start: 2024-06-07 | End: 2024-06-14

## 2024-06-07 NOTE — TELEPHONE ENCOUNTER
Patient is looking to have this medication refilled please advise if its okay to send to pharmacy.     Lov-5/8 Nov- 11/19/24

## 2024-07-22 ENCOUNTER — TELEPHONE (OUTPATIENT)
Dept: GYNECOLOGY | Facility: CLINIC | Age: 54
End: 2024-07-22

## 2024-07-22 NOTE — TELEPHONE ENCOUNTER
Patient would like to see menopause specialist for discussion on hormones, hot flashes, etc. Appointment given with Dr. Thomas for menopause initiative.

## 2024-08-24 ENCOUNTER — APPOINTMENT (EMERGENCY)
Dept: CT IMAGING | Facility: HOSPITAL | Age: 54
DRG: 690 | End: 2024-08-24
Payer: COMMERCIAL

## 2024-08-24 ENCOUNTER — HOSPITAL ENCOUNTER (INPATIENT)
Facility: HOSPITAL | Age: 54
LOS: 1 days | Discharge: HOME | DRG: 690 | End: 2024-08-24
Attending: FAMILY MEDICINE | Admitting: INTERNAL MEDICINE
Payer: COMMERCIAL

## 2024-08-24 VITALS
SYSTOLIC BLOOD PRESSURE: 118 MMHG | HEART RATE: 60 BPM | RESPIRATION RATE: 16 BRPM | BODY MASS INDEX: 26.5 KG/M2 | TEMPERATURE: 97.8 F | WEIGHT: 155.2 LBS | OXYGEN SATURATION: 97 % | HEIGHT: 64 IN | DIASTOLIC BLOOD PRESSURE: 67 MMHG

## 2024-08-24 DIAGNOSIS — R42 DIZZINESS: Primary | ICD-10-CM

## 2024-08-24 DIAGNOSIS — N39.0 UTI (URINARY TRACT INFECTION): ICD-10-CM

## 2024-08-24 DIAGNOSIS — R11.2 NAUSEA & VOMITING: ICD-10-CM

## 2024-08-24 LAB
2HR DELTA HS TROPONIN: 3 NG/L
4HR DELTA HS TROPONIN: 0 NG/L
ALBUMIN SERPL BCG-MCNC: 4.4 G/DL (ref 3.5–5)
ALP SERPL-CCNC: 67 U/L (ref 34–104)
ALT SERPL W P-5'-P-CCNC: 19 U/L (ref 7–52)
ANION GAP SERPL CALCULATED.3IONS-SCNC: 8 MMOL/L (ref 4–13)
AST SERPL W P-5'-P-CCNC: 25 U/L (ref 13–39)
BACTERIA UR QL AUTO: ABNORMAL /HPF
BASOPHILS # BLD AUTO: 0.02 THOUSANDS/ÂΜL (ref 0–0.1)
BASOPHILS NFR BLD AUTO: 0 % (ref 0–1)
BILIRUB SERPL-MCNC: 0.84 MG/DL (ref 0.2–1)
BILIRUB UR QL STRIP: NEGATIVE
BUN SERPL-MCNC: 19 MG/DL (ref 5–25)
CALCIUM SERPL-MCNC: 9.4 MG/DL (ref 8.4–10.2)
CARDIAC TROPONIN I PNL SERPL HS: 21 NG/L
CARDIAC TROPONIN I PNL SERPL HS: 21 NG/L
CARDIAC TROPONIN I PNL SERPL HS: 24 NG/L
CHLORIDE SERPL-SCNC: 105 MMOL/L (ref 96–108)
CLARITY UR: CLEAR
CO2 SERPL-SCNC: 25 MMOL/L (ref 21–32)
COLOR UR: YELLOW
CREAT SERPL-MCNC: 0.6 MG/DL (ref 0.6–1.3)
EOSINOPHIL # BLD AUTO: 0.27 THOUSAND/ÂΜL (ref 0–0.61)
EOSINOPHIL NFR BLD AUTO: 4 % (ref 0–6)
ERYTHROCYTE [DISTWIDTH] IN BLOOD BY AUTOMATED COUNT: 13.8 % (ref 11.6–15.1)
GFR SERPL CREATININE-BSD FRML MDRD: 102 ML/MIN/1.73SQ M
GLUCOSE SERPL-MCNC: 147 MG/DL (ref 65–140)
GLUCOSE UR STRIP-MCNC: NEGATIVE MG/DL
HCT VFR BLD AUTO: 43.3 % (ref 34.8–46.1)
HGB BLD-MCNC: 14.2 G/DL (ref 11.5–15.4)
HGB UR QL STRIP.AUTO: NEGATIVE
IMM GRANULOCYTES # BLD AUTO: 0.01 THOUSAND/UL (ref 0–0.2)
IMM GRANULOCYTES NFR BLD AUTO: 0 % (ref 0–2)
KETONES UR STRIP-MCNC: NEGATIVE MG/DL
LEUKOCYTE ESTERASE UR QL STRIP: NEGATIVE
LYMPHOCYTES # BLD AUTO: 2.91 THOUSANDS/ÂΜL (ref 0.6–4.47)
LYMPHOCYTES NFR BLD AUTO: 41 % (ref 14–44)
MAGNESIUM SERPL-MCNC: 2.1 MG/DL (ref 1.9–2.7)
MCH RBC QN AUTO: 30.1 PG (ref 26.8–34.3)
MCHC RBC AUTO-ENTMCNC: 32.8 G/DL (ref 31.4–37.4)
MCV RBC AUTO: 92 FL (ref 82–98)
MONOCYTES # BLD AUTO: 0.42 THOUSAND/ÂΜL (ref 0.17–1.22)
MONOCYTES NFR BLD AUTO: 6 % (ref 4–12)
NEUTROPHILS # BLD AUTO: 3.55 THOUSANDS/ÂΜL (ref 1.85–7.62)
NEUTS SEG NFR BLD AUTO: 49 % (ref 43–75)
NITRITE UR QL STRIP: POSITIVE
NON-SQ EPI CELLS URNS QL MICRO: ABNORMAL /HPF
NRBC BLD AUTO-RTO: 0 /100 WBCS
PH UR STRIP.AUTO: 6 [PH]
PLATELET # BLD AUTO: 272 THOUSANDS/UL (ref 149–390)
PMV BLD AUTO: 9.2 FL (ref 8.9–12.7)
POTASSIUM SERPL-SCNC: 3.5 MMOL/L (ref 3.5–5.3)
PROT SERPL-MCNC: 6.9 G/DL (ref 6.4–8.4)
PROT UR STRIP-MCNC: NEGATIVE MG/DL
RBC # BLD AUTO: 4.71 MILLION/UL (ref 3.81–5.12)
RBC #/AREA URNS AUTO: ABNORMAL /HPF
SODIUM SERPL-SCNC: 138 MMOL/L (ref 135–147)
SP GR UR STRIP.AUTO: <=1.005
TSH SERPL DL<=0.05 MIU/L-ACNC: 1.72 UIU/ML (ref 0.45–4.5)
UROBILINOGEN UR QL STRIP.AUTO: 0.2 E.U./DL
WBC # BLD AUTO: 7.18 THOUSAND/UL (ref 4.31–10.16)
WBC #/AREA URNS AUTO: ABNORMAL /HPF

## 2024-08-24 PROCEDURE — 96376 TX/PRO/DX INJ SAME DRUG ADON: CPT

## 2024-08-24 PROCEDURE — 93005 ELECTROCARDIOGRAM TRACING: CPT

## 2024-08-24 PROCEDURE — 84443 ASSAY THYROID STIM HORMONE: CPT | Performed by: PHYSICIAN ASSISTANT

## 2024-08-24 PROCEDURE — 83735 ASSAY OF MAGNESIUM: CPT | Performed by: PHYSICIAN ASSISTANT

## 2024-08-24 PROCEDURE — 96361 HYDRATE IV INFUSION ADD-ON: CPT

## 2024-08-24 PROCEDURE — 70496 CT ANGIOGRAPHY HEAD: CPT

## 2024-08-24 PROCEDURE — 85025 COMPLETE CBC W/AUTO DIFF WBC: CPT | Performed by: PHYSICIAN ASSISTANT

## 2024-08-24 PROCEDURE — 81001 URINALYSIS AUTO W/SCOPE: CPT | Performed by: PHYSICIAN ASSISTANT

## 2024-08-24 PROCEDURE — 70498 CT ANGIOGRAPHY NECK: CPT

## 2024-08-24 PROCEDURE — 99203 OFFICE O/P NEW LOW 30 MIN: CPT | Performed by: PSYCHIATRY & NEUROLOGY

## 2024-08-24 PROCEDURE — 99285 EMERGENCY DEPT VISIT HI MDM: CPT | Performed by: PHYSICIAN ASSISTANT

## 2024-08-24 PROCEDURE — 36415 COLL VENOUS BLD VENIPUNCTURE: CPT | Performed by: PHYSICIAN ASSISTANT

## 2024-08-24 PROCEDURE — 99285 EMERGENCY DEPT VISIT HI MDM: CPT

## 2024-08-24 PROCEDURE — 80053 COMPREHEN METABOLIC PANEL: CPT | Performed by: PHYSICIAN ASSISTANT

## 2024-08-24 PROCEDURE — NC001 PR NO CHARGE

## 2024-08-24 PROCEDURE — 99222 1ST HOSP IP/OBS MODERATE 55: CPT

## 2024-08-24 PROCEDURE — 96375 TX/PRO/DX INJ NEW DRUG ADDON: CPT

## 2024-08-24 PROCEDURE — 84484 ASSAY OF TROPONIN QUANT: CPT | Performed by: PHYSICIAN ASSISTANT

## 2024-08-24 PROCEDURE — 96374 THER/PROPH/DIAG INJ IV PUSH: CPT

## 2024-08-24 RX ORDER — HEPARIN SODIUM 5000 [USP'U]/ML
5000 INJECTION, SOLUTION INTRAVENOUS; SUBCUTANEOUS EVERY 8 HOURS SCHEDULED
Status: DISCONTINUED | OUTPATIENT
Start: 2024-08-24 | End: 2024-08-24 | Stop reason: HOSPADM

## 2024-08-24 RX ORDER — CEFTRIAXONE 1 G/50ML
1000 INJECTION, SOLUTION INTRAVENOUS ONCE
Status: COMPLETED | OUTPATIENT
Start: 2024-08-24 | End: 2024-08-24

## 2024-08-24 RX ORDER — NITROFURANTOIN 25; 75 MG/1; MG/1
100 CAPSULE ORAL 2 TIMES DAILY
Qty: 8 CAPSULE | Refills: 0 | Status: SHIPPED | OUTPATIENT
Start: 2024-08-25

## 2024-08-24 RX ORDER — DIAZEPAM 10 MG/2ML
5 INJECTION, SOLUTION INTRAMUSCULAR; INTRAVENOUS ONCE
Status: COMPLETED | OUTPATIENT
Start: 2024-08-24 | End: 2024-08-24

## 2024-08-24 RX ORDER — ONDANSETRON 2 MG/ML
4 INJECTION INTRAMUSCULAR; INTRAVENOUS ONCE
Status: COMPLETED | OUTPATIENT
Start: 2024-08-24 | End: 2024-08-24

## 2024-08-24 RX ORDER — MECLIZINE HCL 12.5 MG 12.5 MG/1
25 TABLET ORAL EVERY 12 HOURS PRN
Qty: 30 TABLET | Refills: 0 | Status: SHIPPED | OUTPATIENT
Start: 2024-08-24

## 2024-08-24 RX ORDER — MECLIZINE HCL 12.5 MG 12.5 MG/1
25 TABLET ORAL ONCE
Status: COMPLETED | OUTPATIENT
Start: 2024-08-24 | End: 2024-08-24

## 2024-08-24 RX ORDER — ALBUTEROL SULFATE 90 UG/1
2 AEROSOL, METERED RESPIRATORY (INHALATION) EVERY 6 HOURS PRN
Status: DISCONTINUED | OUTPATIENT
Start: 2024-08-24 | End: 2024-08-24 | Stop reason: HOSPADM

## 2024-08-24 RX ADMIN — ONDANSETRON 4 MG: 2 INJECTION INTRAMUSCULAR; INTRAVENOUS at 12:05

## 2024-08-24 RX ADMIN — IOHEXOL 85 ML: 350 INJECTION, SOLUTION INTRAVENOUS at 11:15

## 2024-08-24 RX ADMIN — SODIUM CHLORIDE 1000 ML: 0.9 INJECTION, SOLUTION INTRAVENOUS at 10:33

## 2024-08-24 RX ADMIN — MECLIZINE HYDROCHLORIDE 25 MG: 12.5 TABLET ORAL at 11:03

## 2024-08-24 RX ADMIN — CEFTRIAXONE 1000 MG: 1 INJECTION, SOLUTION INTRAVENOUS at 14:21

## 2024-08-24 RX ADMIN — SODIUM CHLORIDE 1000 ML: 0.9 INJECTION, SOLUTION INTRAVENOUS at 12:04

## 2024-08-24 RX ADMIN — DIAZEPAM 5 MG: 5 INJECTION INTRAMUSCULAR; INTRAVENOUS at 12:06

## 2024-08-24 RX ADMIN — ONDANSETRON 4 MG: 2 INJECTION INTRAMUSCULAR; INTRAVENOUS at 10:40

## 2024-08-24 NOTE — H&P
Critical access hospital  Progress Note  Name: Kylee De Paz I  MRN: [id number]  Unit/Bed#: -01 I Date of Admission: 8/24/2024   Date of Service: 8/24/2024 I Hospital Day: 0    Assessment & Plan   UTI (urinary tract infection)  Assessment & Plan  Patient is symptomatic with burning during urination-noted to have nitrites on UA and started on ceftriaxone in ED  Will be discharged on Macrobid 100 twice daily for 4 days    Mild intermittent asthma without complication  Assessment & Plan  Continue home albuterol as needed    Anxiety with depression  Assessment & Plan  Continue Zoloft    * Vertigo  Assessment & Plan  Patient presenting with dizziness for the past day after amusement park ride.  CTA negative    Neurology consulted and recommended no additional workup-patient stable to go home from their end  Will discharge on meclizine as needed              VTE Pharmacologic Prophylaxis:   Moderate Risk (Score 3-4) - Pharmacological DVT Prophylaxis Ordered: heparin.  Code Status: Level 1 - Full Code   Discussion with family: Patient declined call to .     Anticipated Length of Stay: Patient will be admitted on an observation basis with an anticipated length of stay of less than 2 midnights secondary to dizziness.    Total Time Spent on Date of Encounter in care of patient: 40 mins. This time was spent on one or more of the following: performing physical exam; counseling and coordination of care; obtaining or reviewing history; documenting in the medical record; reviewing/ordering tests, medications or procedures; communicating with other healthcare professionals and discussing with patient's family/caregivers.    Chief Complaint: Dizziness    History of Present Illness:  Kylee De Paz is a 54 y.o. female with a PMH of asthma, depression who presents with dizziness.  Patient was at an amusement park yesterday and then after a specific ride became extremely dizzy.  Symptoms initially got  better but then she went to bed and later that night awoke with severe dizziness, room spinning, worsening with head movements.  In the ED CTA was done which was negative.  Neurology was consulted and pending final recommendations but will pursue MRI to rule out central cause.  Patient seen and examined with no active dizziness-after discussion with neurology and their assessment no need for further imaging.  Patient stable to be discharged from their end.    Review of Systems:  Review of Systems   Constitutional:  Negative for chills and fever.   HENT:  Negative for ear pain and sore throat.    Eyes:  Negative for pain and visual disturbance.   Respiratory:  Negative for cough and shortness of breath.    Cardiovascular:  Negative for chest pain and palpitations.   Gastrointestinal:  Negative for abdominal pain and vomiting.   Genitourinary:  Positive for dysuria. Negative for hematuria.   Musculoskeletal:  Negative for arthralgias and back pain.   Skin:  Negative for color change and rash.   Neurological:  Negative for dizziness, syncope and headaches.   Psychiatric/Behavioral: Negative.     All other systems reviewed and are negative.      Past Medical and Surgical History:   Past Medical History:   Diagnosis Date    Allergic 2019    Supha drugs    Anxiety     Carpal tunnel syndrome     right wrist    Chronic back pain     Depression     Obesity (BMI 30.0-34.9) 11/03/2020       Past Surgical History:   Procedure Laterality Date    CARPAL TUNNEL RELEASE Left 10/2021    NO PAST SURGERIES         Meds/Allergies:  Prior to Admission medications    Medication Sig Start Date End Date Taking? Authorizing Provider   albuterol (Ventolin HFA) 90 mcg/act inhaler Inhale 2 puffs every 6 (six) hours as needed for wheezing 10/24/22   Paloma Quinones DO   clobetasol (TEMOVATE) 0.05 % cream Apply topically 2 (two) times a day as needed (rash) 11/21/23   Karl Gaitan MD   cyclobenzaprine (FLEXERIL) 5 mg tablet Take 1 tablet (5  "mg total) by mouth 3 (three) times a day as needed for muscle spasms for up to 7 days 6/7/24 6/14/24  Temo Silver MD   hydrocortisone 2.5 % ointment APPLY A THIN LAYER TO RASH ON FACE 2 TIMES A DAY FOR 1 WEEK  THEN...  (REFER TO PRESCRIPTION NOTES). 2/21/24   Historical Provider, MD   ibuprofen (MOTRIN) 200 mg tablet Take 400 mg by mouth every 6 (six) hours as needed for mild pain    Historical Provider, MD   sertraline (Zoloft) 50 mg tablet Take 1 tablet (50 mg total) by mouth daily 6/7/24 12/4/24  Temo Silver MD   valACYclovir (VALTREX) 1,000 mg tablet Take 1 tablet (1,000 mg total) by mouth 3 (three) times a day for 7 days 11/21/23 5/8/24  Karl Gaitan MD     I have reviewed home medications with patient personally.    Allergies:   Allergies   Allergen Reactions    Bactrim [Sulfamethoxazole-Trimethoprim] Hives       Social History:  Marital Status: /Civil Union   Occupation: [employer]  Patient Pre-hospital Living Situation: Home  Patient Pre-hospital Level of Mobility: walks  Patient Pre-hospital Diet Restrictions: N/A  Substance Use History:   Social History     Substance and Sexual Activity   Alcohol Use Yes    Alcohol/week: 4.0 standard drinks of alcohol    Types: 4 Glasses of wine per week    Comment: socially     Social History     Tobacco Use   Smoking Status Never   Smokeless Tobacco Never     Social History     Substance and Sexual Activity   Drug Use Never    Comment: Denied drug use       Family History:  Family History   Problem Relation Age of Onset    Hypertension Mother     Vision loss Mother         UNSURE WHY    Macular degeneration Mother     Alcohol abuse Father     Mental illness Father     Schizophrenia Father     Breast cancer Maternal Aunt        Physical Exam:     Vitals:   Blood Pressure: 118/67 (08/24/24 1455)  Pulse: 60 (08/24/24 1455)  Temperature: 97.8 °F (36.6 °C) (08/24/24 1455)  Temp Source: Oral (08/24/24 1455)  Respirations: 16 (08/24/24 1455)  Height: 5' 4\" (162.6 " cm) (08/24/24 1010)  Weight - Scale: 70.4 kg (155 lb 3.3 oz) (08/24/24 1455)  SpO2: 98 % (08/24/24 1455)    Physical Exam  Vitals reviewed.   HENT:      Head: Normocephalic and atraumatic.      Mouth/Throat:      Mouth: Mucous membranes are moist.      Pharynx: Oropharynx is clear.   Cardiovascular:      Rate and Rhythm: Normal rate and regular rhythm.   Pulmonary:      Effort: Pulmonary effort is normal.      Breath sounds: Normal breath sounds.   Abdominal:      General: Abdomen is flat. Bowel sounds are normal. There is no distension.      Tenderness: There is no abdominal tenderness.   Musculoskeletal:      Right lower leg: No edema.      Left lower leg: No edema.   Skin:     General: Skin is warm and dry.   Neurological:      Mental Status: She is alert and oriented to person, place, and time.   Psychiatric:         Mood and Affect: Mood normal.         Behavior: Behavior normal.          Additional Data:     Lab Results:  Results from last 7 days   Lab Units 08/24/24  1031   WBC Thousand/uL 7.18   HEMOGLOBIN g/dL 14.2   HEMATOCRIT % 43.3   PLATELETS Thousands/uL 272   SEGS PCT % 49   LYMPHO PCT % 41   MONO PCT % 6   EOS PCT % 4     Results from last 7 days   Lab Units 08/24/24  1031   SODIUM mmol/L 138   POTASSIUM mmol/L 3.5   CHLORIDE mmol/L 105   CO2 mmol/L 25   BUN mg/dL 19   CREATININE mg/dL 0.60   ANION GAP mmol/L 8   CALCIUM mg/dL 9.4   ALBUMIN g/dL 4.4   TOTAL BILIRUBIN mg/dL 0.84   ALK PHOS U/L 67   ALT U/L 19   AST U/L 25   GLUCOSE RANDOM mg/dL 147*             Lab Results   Component Value Date    HGBA1C 5.0 09/18/2019           Lines/Drains:  Invasive Devices       Peripheral Intravenous Line  Duration             Peripheral IV 08/24/24 Right;Ventral (anterior);Proximal Antecubital <1 day                        Imaging: No pertinent imaging reviewed.  CTA head and neck with and without contrast   Final Result by Pallav N Shah, MD (08/24 8864)      CT Brain:  No acute intracranial abnormality. 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 Nonemergent MR imaging with and without contrast could be performed for complete    characterization of the pineal lesion.      CT Angiography:  Unremarkable CTA neck and brain.      Heterogeneous thyroid gland with multiple nodules in the right thyroid lobe; the largest measuring 1.2 cm. No additional sonographic work-up is recommended at this time.                  Workstation performed: [id number]             EKG and Other Studies Reviewed on Admission:   EKG: NSR. HR 55.    ** Please Note: This note has been constructed using a voice recognition system. **

## 2024-08-24 NOTE — DISCHARGE INSTR - AVS FIRST PAGE
1) Please follow up with your PCP in 1-2 weeks    2) A referral to physical therapy has been placed for vestibular therapy    3) Please take your Macrobid for the next 4 days

## 2024-08-24 NOTE — ASSESSMENT & PLAN NOTE
Patient is symptomatic with burning during urination-noted to have nitrites on UA and started on ceftriaxone in ED  Will be discharged on Macrobid 100 twice daily for 4 days

## 2024-08-24 NOTE — ED PROVIDER NOTES
History  Chief Complaint   Patient presents with    Dizziness     Pt reports dizziness w/ nauesa that started last night while laying in bed     Patient is a 54-year-old female with a PMHx of anxiety, carpal tunnel syndrome (right) and chronic back pain, presenting to the ED for evaluation of dizziness and nausea since last night.  Patient states that she was at an amusement park yesterday and felt nauseous and slightly dizzy after going on the [employer] swing ride. She states that these symptoms resolved a few minutes after getting off of the ride. She laid down later that night to go to bed around midnight when she developed recurrence of dizziness which she describes as a room spinning sensation. She states that this was associated with nausea and diaphoresis.  She states that the dizziness worsens with turning her head side to side, standing and ambulation and improves when laying flat and not moving her head. She was able to sleep but woke up this morning with persistent dizziness and nausea and had an episode of vomiting. She denies any associated headaches, neck pain, visual disturbances, diplopia, tinnitus, dysarthria, facial asymmetry or unilateral numbness/weakness.  She does report ongoing numbness/tingling in the right hand in the distrubution of the median nerve which is unchanged. She denies any fevers, chills or recent illnesses. She denies any chest pain, shortness of breath or palpitations. She endorses dysuria over the past week but denies any abdominal pain, flank pain, hematuria or difficulty urinating.  She denies any history of vertigo.  She also notes that she is currently training for a triathlon and has been doing a lot of physical activity recently (swimming, running and biking), but took the day off yesterday.            Prior to Admission Medications   Prescriptions Last Dose Informant Patient Reported? Taking?   albuterol (Ventolin HFA) 90 mcg/act inhaler  Self No No   Sig: Inhale 2  puffs every 6 (six) hours as needed for wheezing   clobetasol (TEMOVATE) 0.05 % cream  Self No No   Sig: Apply topically 2 (two) times a day as needed (rash)   cyclobenzaprine (FLEXERIL) 5 mg tablet   No No   Sig: Take 1 tablet (5 mg total) by mouth 3 (three) times a day as needed for muscle spasms for up to 7 days   hydrocortisone 2.5 % ointment  Self Yes No   Sig: APPLY A THIN LAYER TO RASH ON FACE 2 TIMES A DAY FOR 1 WEEK  THEN...  (REFER TO PRESCRIPTION NOTES).   ibuprofen (MOTRIN) 200 mg tablet  Self Yes No   Sig: Take 400 mg by mouth every 6 (six) hours as needed for mild pain   sertraline (Zoloft) 50 mg tablet 8/23/2024  No Yes   Sig: Take 1 tablet (50 mg total) by mouth daily   valACYclovir (VALTREX) 1,000 mg tablet  Self No No   Sig: Take 1 tablet (1,000 mg total) by mouth 3 (three) times a day for 7 days      Facility-Administered Medications: None       Past Medical History:   Diagnosis Date    Allergic 2019    Supha drugs    Anxiety     Carpal tunnel syndrome     right wrist    Chronic back pain     Depression     Obesity (BMI 30.0-34.9) 11/03/2020       Past Surgical History:   Procedure Laterality Date    CARPAL TUNNEL RELEASE Left 10/2021    NO PAST SURGERIES         Family History   Problem Relation Age of Onset    Hypertension Mother     Vision loss Mother         UNSURE WHY    Macular degeneration Mother     Alcohol abuse Father     Mental illness Father     Schizophrenia Father     Breast cancer Maternal Aunt      I have reviewed and agree with the history as documented.    E-Cigarette/Vaping    E-Cigarette Use Never User      E-Cigarette/Vaping Substances    Nicotine No     THC No     CBD No     Flavoring No     Other No     Unknown No      Social History     Tobacco Use    Smoking status: Never    Smokeless tobacco: Never   Vaping Use    Vaping status: Never Used   Substance Use Topics    Alcohol use: Yes     Alcohol/week: 4.0 standard drinks of alcohol     Types: 4 Glasses of wine per week      Comment: socially    Drug use: Never     Comment: Denied drug use       Review of Systems   Constitutional:  Negative for chills and fever.   HENT:  Negative for congestion, rhinorrhea and sore throat.    Eyes:  Negative for visual disturbance.   Respiratory:  Negative for cough and shortness of breath.    Cardiovascular:  Negative for chest pain, palpitations and leg swelling.   Gastrointestinal:  Positive for nausea and vomiting. Negative for abdominal pain, constipation and diarrhea.   Genitourinary:  Positive for dysuria. Negative for flank pain, frequency and hematuria.   Musculoskeletal:  Negative for back pain and neck pain.   Skin:  Negative for rash.   Neurological:  Positive for dizziness. Negative for syncope, weakness and headaches.   All other systems reviewed and are negative.      Physical Exam  Physical Exam  Vitals and nursing note reviewed.   Constitutional:       General: She is awake.      Appearance: Normal appearance. She is well-developed. She is not toxic-appearing or diaphoretic.   HENT:      Head: Normocephalic and atraumatic.      Right Ear: External ear normal.      Left Ear: External ear normal.      Nose: Nose normal.      Mouth/Throat:      Lips: Pink.      Mouth: Mucous membranes are moist.   Eyes:      General: Lids are normal. No scleral icterus.     Conjunctiva/sclera: Conjunctivae normal.      Pupils: Pupils are equal, round, and reactive to light.   Cardiovascular:      Rate and Rhythm: Normal rate and regular rhythm.      Pulses: Normal pulses.           Radial pulses are 2+ on the right side and 2+ on the left side.      Heart sounds: Normal heart sounds, S1 normal and S2 normal.   Pulmonary:      Effort: Pulmonary effort is normal. No accessory muscle usage.      Breath sounds: Normal breath sounds. No stridor. No decreased breath sounds, wheezing, rhonchi or rales.   Abdominal:      General: Abdomen is flat. Bowel sounds are normal. There is no distension.      Palpations:  Abdomen is soft.      Tenderness: There is no abdominal tenderness. There is no right CVA tenderness, left CVA tenderness, guarding or rebound.   Musculoskeletal:      Cervical back: Full passive range of motion without pain and neck supple. No signs of trauma. No pain with movement.      Right lower leg: No edema.      Left lower leg: No edema.   Lymphadenopathy:      Cervical: No cervical adenopathy.   Skin:     General: Skin is warm and dry.      Capillary Refill: Capillary refill takes less than 2 seconds.      Coloration: Skin is not cyanotic, jaundiced or pale.   Neurological:      General: No focal deficit present.      Mental Status: She is alert and oriented to person, place, and time.      GCS: GCS eye subscore is 4. GCS verbal subscore is 5. GCS motor subscore is 6.      Gait: Gait is intact. Gait normal.      Comments: Patient is awake, alert and oriented x 3.  No dysarthria, facial asymmetry or visual deficits.  Sensation equal and intact bilaterally.  Strength 5/5 in bilateral upper and lower extremities.  Negative pronator drift.  Normal coordination including finger-nose and heel-to-baever.  Negative Romberg.  Patient was able to ambulate with a normal steady gait.  Patient has slight unsteadiness with tandem walk only.  HINTS exam: normal head impulse, 1-2 beats of horizontal nystagmus to left that then resolved, normal test of skew   Psychiatric:         Mood and Affect: Mood normal.         Speech: Speech normal.         Behavior: Behavior is cooperative.         Vital Signs  ED Triage Vitals   Temperature Pulse Respirations Blood Pressure SpO2   08/24/24 1010 08/24/24 1010 08/24/24 1010 08/24/24 1010 08/24/24 1010   98.1 °F (36.7 °C) 61 18 144/79 98 %      Temp Source Heart Rate Source Patient Position - Orthostatic VS BP Location FiO2 (%)   08/24/24 1455 08/24/24 1010 08/24/24 1010 08/24/24 1010 --   Oral Monitor Sitting Left arm       Pain Score       08/24/24 1010       No Pain           Vitals:     08/24/24 1010 08/24/24 1030 08/24/24 1230 08/24/24 1455   BP: 144/79 131/78 130/58 118/67   Pulse: 61 58 57 60   Patient Position - Orthostatic VS: Sitting Lying Sitting Sitting         Visual Acuity      ED Medications  Medications   heparin (porcine) subcutaneous injection 5,000 Units (has no administration in time range)   sodium chloride 0.9 % bolus 1,000 mL (0 mL Intravenous Stopped 8/24/24 1200)   ondansetron (ZOFRAN) injection 4 mg (4 mg Intravenous Given 8/24/24 1040)   meclizine (ANTIVERT) tablet 25 mg (25 mg Oral Given 8/24/24 1103)   iohexol (OMNIPAQUE) 350 MG/ML injection (MULTI-DOSE) 85 mL (85 mL Intravenous Given 8/24/24 1115)   ondansetron (ZOFRAN) injection 4 mg (4 mg Intravenous Given 8/24/24 1205)   sodium chloride 0.9 % bolus 1,000 mL (0 mL Intravenous Stopped 8/24/24 1354)   diazepam (VALIUM) injection 5 mg (5 mg Intravenous Given 8/24/24 1206)   cefTRIAXone (ROCEPHIN) IVPB (premix in dextrose) 1,000 mg 50 mL (0 mg Intravenous Stopped 8/24/24 1441)       Diagnostic Studies  Results Reviewed       Procedure Component Value Units Date/Time    HS Troponin I 4hr [[id number]]  (Normal) Collected: 08/24/24 1421    Lab Status: Final result Specimen: Blood from Arm, Right Updated: 08/24/24 1500     hs TnI 4hr 21 ng/L      Delta 4hr hsTnI 0 ng/L     Urine Microscopic [[id number]]  (Abnormal) Collected: 08/24/24 1354    Lab Status: Final result Specimen: Urine, Clean Catch Updated: 08/24/24 1433     RBC, UA None Seen /hpf      WBC, UA 1-2 /hpf      Epithelial Cells Occasional /hpf      Bacteria, UA Moderate /hpf     UA w Reflex to Microscopic w Reflex to Culture [[id number]]  (Abnormal) Collected: 08/24/24 1354    Lab Status: Final result Specimen: Urine, Clean Catch Updated: 08/24/24 1401     Color, UA Yellow     Clarity, UA Clear     Specific Gravity, UA <=1.005     pH, UA 6.0     Leukocytes, UA Negative     Nitrite, UA Positive     Protein, UA Negative mg/dl      Glucose, UA Negative mg/dl      Ketones,  UA Negative mg/dl      Urobilinogen, UA 0.2 E.U./dl      Bilirubin, UA Negative     Occult Blood, UA Negative    HS Troponin I 2hr [[id number]]  (Normal) Collected: 08/24/24 1242    Lab Status: Final result Specimen: Blood from Arm, Right Updated: 08/24/24 1313     hs TnI 2hr 24 ng/L      Delta 2hr hsTnI 3 ng/L     TSH, 3rd generation with Free T4 reflex [[id number]]  (Normal) Collected: 08/24/24 1031    Lab Status: Final result Specimen: Blood from Arm, Right Updated: 08/24/24 1119     TSH 3RD GENERATON 1.724 uIU/mL     HS Troponin 0hr (reflex protocol) [[id number]]  (Normal) Collected: 08/24/24 1031    Lab Status: Final result Specimen: Blood from Arm, Right Updated: 08/24/24 1110     hs TnI 0hr 21 ng/L     Comprehensive metabolic panel [[id number]]  (Abnormal) Collected: 08/24/24 1031    Lab Status: Final result Specimen: Blood from Arm, Right Updated: 08/24/24 1102     Sodium 138 mmol/L      Potassium 3.5 mmol/L      Chloride 105 mmol/L      CO2 25 mmol/L      ANION GAP 8 mmol/L      BUN 19 mg/dL      Creatinine 0.60 mg/dL      Glucose 147 mg/dL      Calcium 9.4 mg/dL      AST 25 U/L      ALT 19 U/L      Alkaline Phosphatase 67 U/L      Total Protein 6.9 g/dL      Albumin 4.4 g/dL      Total Bilirubin 0.84 mg/dL      eGFR 102 ml/min/1.73sq m     Narrative:      [employer] Kid[employer] guidelines for Chronic Kidney Disease (CKD):     Stage 1 with normal or high GFR (GFR > 90 mL/min/1.73 square meters)    Stage 2 Mild CKD (GFR = 60-89 mL/min/1.73 square meters)    Stage 3A Moderate CKD (GFR = 45-59 mL/min/1.73 square meters)    Stage 3B Moderate CKD (GFR = 30-44 mL/min/1.73 square meters)    Stage 4 Severe CKD (GFR = 15-29 mL/min/1.73 square meters)    Stage 5 End Stage CKD (GFR <15 mL/min/1.73 square meters)  Note: GFR calculation is accurate only with a steady state creatinine    Magnesium [[id number]]  (Normal) Collected: 08/24/24 1031    Lab Status: Final result Specimen: Blood from Arm, Right Updated:  08/24/24 1102     Magnesium 2.1 mg/dL     CBC and differential [[id number]] Collected: 08/24/24 1031    Lab Status: Final result Specimen: Blood from Arm, Right Updated: 08/24/24 1046     WBC 7.18 Thousand/uL      RBC 4.71 Million/uL      Hemoglobin 14.2 g/dL      Hematocrit 43.3 %      MCV 92 fL      MCH 30.1 pg      MCHC 32.8 g/dL      RDW 13.8 %      MPV 9.2 fL      Platelets 272 Thousands/uL      nRBC 0 /100 WBCs      Segmented % 49 %      Immature Grans % 0 %      Lymphocytes % 41 %      Monocytes % 6 %      Eosinophils Relative 4 %      Basophils Relative 0 %      Absolute Neutrophils 3.55 Thousands/µL      Absolute Immature Grans 0.01 Thousand/uL      Absolute Lymphocytes 2.91 Thousands/µL      Absolute Monocytes 0.42 Thousand/µL      Eosinophils Absolute 0.27 Thousand/µL      Basophils Absolute 0.02 Thousands/µL                    CTA head and neck with and without contrast   Final Result by Pallav N Shah, MD (08/24 1159)      CT Brain:  No acute intracranial abnormality. 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 Nonemergent MR imaging with and without contrast could be performed for complete    characterization of the pineal lesion.      CT Angiography:  Unremarkable CTA neck and brain.      Heterogeneous thyroid gland with multiple nodules in the right thyroid lobe; the largest measuring 1.2 cm. No additional sonographic work-up is recommended at this time.                  Workstation performed: [id number]         MRI inpatient order    (Results Pending)              Procedures  ECG 12 Lead Documentation Only    Date/Time: 8/24/2024 10:43 AM    Performed by: Annalee Lucia PA-C  Authorized by: Annalee Lucia PA-C    Indications / Diagnosis:  Dizzy, nausea  ECG reviewed by me, the ED Provider: yes    Patient location:  ED  Previous ECG:     Previous ECG:  Unavailable    Comparison to cardiac monitor: Yes    Interpretation:      Interpretation: normal    Rate:     ECG rate:  55    ECG rate assessment: bradycardic    Rhythm:     Rhythm: sinus bradycardia    Ectopy:     Ectopy: none    QRS:     QRS axis:  Normal    QRS intervals:  Normal  Conduction:     Conduction: normal    ST segments:     ST segments:  Normal  T waves:     T waves: normal    Comments:      No STEMI.           ED Course                                 SBIRT 22yo+      Flowsheet Row Most Recent Value   Initial Alcohol Screen: US AUDIT-C     1. How often do you have a drink containing alcohol? 0 Filed at: 08/24/2024 1009   2. How many drinks containing alcohol do you have on a typical day you are drinking?  0 Filed at: 08/24/2024 1009   3a. Male UNDER 65: How often do you have five or more drinks on one occasion? 0 Filed at: 08/24/2024 1009   3b. FEMALE Any Age, or MALE 65+: How often do you have 4 or more drinks on one occassion? 0 Filed at: 08/24/2024 1009   Audit-C Score 0 Filed at: 08/24/2024 1009   LUCAS: How many times in the past year have you...    Used an illegal drug or used a prescription medication for non-medical reasons? Never Filed at: 08/24/2024 1009                      Medical Decision Making  Patient is a 54-year-old female with a PMHx of anxiety, carpal tunnel syndrome and chronic back pain, presenting to the ED for evaluation of dizziness and nausea since last night.    DDx including but not limited to: benign paroxysmal positional vertigo, Ménière's disease, vestibular neuritis, labyrinthitis, cerebellar stroke or other intracranial etiology, tumor, vertebral or carotid artery dissection, metabolic abnormality, cardiac abnormality, thyroid disease, infectious process.     Patient's labs are unremarkable.  EKG sinus bradycardia with no acute ischemic changes and serial troponin's negative. Patient has no neurological deficits on exam with an NIH of 0. Patient's symptoms most consistent with a peripheral vertigo such as BPPV, but a CTA was obtained for  "further evaluation of possible central causes.  CTA was unremarkable aside from a \"cystic pineal lesion measuring nearly 2 cm with mild mass effect on the tectum but no associated hydrocephalus\".  Patient was given IV fluids, Zofran, meclizine and Valium with some improvement in symptoms; however, she continues to have persistent dizziness with head movements and with standing/ambulating.  Discussed with neurology who agree that central etiology is less likely based on symptoms and exam; however, only 100% way to rule this out is with MRI. Patient was admitted to internal medicine for continued workup and management.       Problems Addressed:  UTI (urinary tract infection):     Details: UA positive for nitrites and bacteria, consistent with UTI.  Patient symptomatic with reported dysuria x 1 week.  She was started on Rocephin in the ED.    Amount and/or Complexity of Data Reviewed  Labs: ordered.  Radiology: ordered.  Discussion of management or test interpretation with external provider(s): Dr. Del Toro (neuro)    Risk  Prescription drug management.  Decision regarding hospitalization.                 Disposition  Final diagnoses:   Dizziness   Nausea & vomiting   UTI (urinary tract infection)     Time reflects when diagnosis was documented in both MDM as applicable and the Disposition within this note       Time User Action Codes Description Comment    8/24/2024  2:10 PM Annalee Lucia Add [R42] Dizziness     8/24/2024  2:11 PM Annalee Lucia Add [R11.2] Nausea & vomiting     8/24/2024  2:11 PM Annalee Lucia Add [N39.0] UTI (urinary tract infection)           ED Disposition       ED Disposition   Admit    Condition   Stable    Date/Time   Sat Aug 24, 2024 1423    Comment   Case was discussed with MYRNA and the patient's admission status was agreed to be Admission Status: Inpatient status to the service of Dr. Randolph.               Follow-up Information    None         Current Discharge Medication List    "     CONTINUE these medications which have NOT CHANGED    Details   sertraline (Zoloft) 50 mg tablet Take 1 tablet (50 mg total) by mouth daily  Qty: 90 tablet, Refills: 1    Associated Diagnoses: Anxiety with depression      albuterol (Ventolin HFA) 90 mcg/act inhaler Inhale 2 puffs every 6 (six) hours as needed for wheezing  Qty: 18 g, Refills: 5    Comments: Substitution to a formulary equivalent within the same pharmaceutical class is authorized.  Associated Diagnoses: Reactive airway disease with acute exacerbation, unspecified asthma severity, unspecified whether persistent      clobetasol (TEMOVATE) 0.05 % cream Apply topically 2 (two) times a day as needed (rash)  Qty: 30 g, Refills: 0    Associated Diagnoses: Herpes zoster without complication      cyclobenzaprine (FLEXERIL) 5 mg tablet Take 1 tablet (5 mg total) by mouth 3 (three) times a day as needed for muscle spasms for up to 7 days  Qty: 20 tablet, Refills: 0    Associated Diagnoses: Acute exacerbation of chronic low back pain; Lumbar disc herniation with radiculopathy      hydrocortisone 2.5 % ointment APPLY A THIN LAYER TO RASH ON FACE 2 TIMES A DAY FOR 1 WEEK  THEN...  (REFER TO PRESCRIPTION NOTES).      ibuprofen (MOTRIN) 200 mg tablet Take 400 mg by mouth every 6 (six) hours as needed for mild pain      valACYclovir (VALTREX) 1,000 mg tablet Take 1 tablet (1,000 mg total) by mouth 3 (three) times a day for 7 days  Qty: 21 tablet, Refills: 0    Associated Diagnoses: Herpes zoster without complication             No discharge procedures on file.    PDMP Review       None            ED Provider  Electronically Signed by             Annalee Lucia PA-C  08/24/24 [id number]

## 2024-08-24 NOTE — PLAN OF CARE
Problem: NEUROSENSORY - ADULT  Goal: Achieves stable or improved neurological status  Description: INTERVENTIONS  - Monitor and report changes in neurological status  - Monitor vital signs such as temperature, blood pressure, glucose, and any other labs ordered   - Initiate measures to prevent increased intracranial pressure  - Monitor for seizure activity and implement precautions if appropriate      Outcome: Adequate for Discharge  Goal: Achieves maximal functionality and self care  Description: INTERVENTIONS  - Monitor swallowing and airway patency with patient fatigue and changes in neurological status  - Encourage and assist patient to increase activity and self care.   - Encourage visually impaired, hearing impaired and aphasic patients to use assistive/communication devices  Outcome: Adequate for Discharge     Problem: CARDIOVASCULAR - ADULT  Goal: Maintains optimal cardiac output and hemodynamic stability  Description: INTERVENTIONS:  - Monitor I/O, vital signs and rhythm  - Monitor for S/S and trends of decreased cardiac output  - Administer and titrate ordered vasoactive medications to optimize hemodynamic stability  - Assess quality of pulses, skin color and temperature  - Assess for signs of decreased coronary artery perfusion  - Instruct patient to report change in severity of symptoms  Outcome: Adequate for Discharge     Problem: METABOLIC, FLUID AND ELECTROLYTES - ADULT  Goal: Electrolytes maintained within normal limits  Description: INTERVENTIONS:  - Monitor labs and assess patient for signs and symptoms of electrolyte imbalances  - Administer electrolyte replacement as ordered  - Monitor response to electrolyte replacements, including repeat lab results as appropriate  - Instruct patient on fluid and nutrition as appropriate  Outcome: Adequate for Discharge  Goal: Fluid balance maintained  Description: INTERVENTIONS:  - Monitor labs   - Monitor I/O and WT  - Instruct patient on fluid and  nutrition as appropriate  - Assess for signs & symptoms of volume excess or deficit  Outcome: Adequate for Discharge

## 2024-08-24 NOTE — ASSESSMENT & PLAN NOTE
Vertigo, most likely peripheral given the lack of focal complaints or deficits, normotension, latency of symptoms with respect to movement, and 100% complete alleviation of symptoms at rest.  Also test of skew is negative as per ED provider.  -Symptoms are significantly improved, this is also fitting with typical BPPV timeframe.  - No clear need for additional imaging or workup at this time.  - Okay to discharge from neurologic standpoint.  - Consider a few pills of meclizine as needed on discharge.  - No clear need for new outpatient neurologic follow-up at this time.  - Please call question/concerns

## 2024-08-24 NOTE — Clinical Note
Case was discussed with MYRNA and the patient's admission status was agreed to be Admission Status: observation status to the service of Dr. Randolph.

## 2024-08-24 NOTE — ASSESSMENT & PLAN NOTE
Patient presenting with dizziness for the past day after amusement park ride.  CTA negative    Neurology consulted and recommended no additional workup-patient stable to go home from their end  Will discharge on meclizine as needed

## 2024-08-24 NOTE — DISCHARGE SUMMARY
[facility]  Progress Note  Name: Kylee De Paz I  MRN: [id number]  Unit/Bed#: -01 I Date of Admission: 8/24/2024   Date of Service: 8/24/2024 I Hospital Day: 0    Assessment & Plan   UTI (urinary tract infection)  Assessment & Plan  Patient is symptomatic with burning during urination-noted to have nitrites on UA and started on ceftriaxone in ED  Will be discharged on Macrobid 100 twice daily for 4 days    Mild intermittent asthma without complication  Assessment & Plan  Continue home albuterol as needed    Anxiety with depression  Assessment & Plan  Continue Zoloft    * Vertigo  Assessment & Plan  Patient presenting with dizziness for the past day after amusement park ride.  CTA negative    Neurology consulted and recommended no additional workup-patient stable to go home from their end  Will discharge on meclizine as needed          Medical Problems       Resolved Problems  Date Reviewed: 8/24/2024   None       Discharging Physician / Practitioner: Moose Galvin MD  PCP: Temo Silver MD  Admission Date:   Admission Orders (From admission, onward)       Ordered        08/24/24 1423  INPATIENT ADMISSION  Once                          Discharge Date: 08/24/24    Consultations During Hospital Stay:  Neuro    Procedures Performed:   N/A    Significant Findings / Test Results:   CT Brain:  No acute intracranial abnormality. 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 Nonemergent MR imaging with and without contrast could be performed for complete characterization of the pineal lesion.   CT Angiography:  Unremarkable CTA neck and brain.    Heterogeneous thyroid gland with multiple nodules in the right thyroid lobe; the largest measuring 1.2 cm. No additional sonographic work-up is recommended at this time.    Incidental Findings:   Pineal lesion noted above   I reviewed the above mentioned incidental findings with the  patient and/or family and they expressed understanding.    Test Results Pending at Discharge (will require follow up):   N/A     Outpatient Tests Requested:  N/A    Complications:  None    Reason for Admission: Dizziness    Hospital Course:   Kylee De Paz is a 54 y.o. female with a PMH of asthma, depression who presents with dizziness.  Patient was at an amusement park yesterday and then after a specific ride became extremely dizzy.  Symptoms initially got better but then she went to bed and later that night awoke with severe dizziness, room spinning, worsening with head movements.  In the ED CTA was done which was negative.  Neurology was consulted and pending final recommendations but will pursue MRI to rule out central cause.  Patient seen and examined with no active dizziness-after discussion with neurology and their assessment no need for further imaging.  Patient stable to be discharged from their end.     Please see above list of diagnoses and related plan for additional information.     Condition at Discharge: fair    Discharge Day Visit / Exam:   * Please refer to separate progress note for these details *    Discussion with Family: Updated  () at bedside.    Discharge instructions/Information to patient and family:   See after visit summary for information provided to patient and family.      Provisions for Follow-Up Care:  See after visit summary for information related to follow-up care and any pertinent home health orders.      Mobility at time of Discharge:      HLM Goal achieved. Continue to encourage appropriate mobility.     Disposition:   Home    Planned Readmission: N/A     Discharge Statement:  I spent 30 minutes discharging the patient. This time was spent on the day of discharge. I had direct contact with the patient on the day of discharge. Greater than 50% of the total time was spent examining patient, answering all patient questions, arranging and discussing plan of  care with patient as well as directly providing post-discharge instructions.  Additional time then spent on discharge activities.    Discharge Medications:  See after visit summary for reconciled discharge medications provided to patient and/or family.      **Please Note: This note may have been constructed using a voice recognition system**

## 2024-08-24 NOTE — TELEMEDICINE
TeleConsultation - Neurology   Kylee De Paz 54 y.o. female MRN: [id number]  Unit/Bed#: -01 Encounter: [id number]      VIRTUAL CARE DOCUMENTATION:     1. This service was provided via Telemedicine using [employer]     2. Parties in the room with patient during teleconsult Patient only    3. Confidentiality My office door was closed     4. Participants No one else was in the room    5. Patient acknowledged consent and understanding of privacy and security of the  Telemedicine consult. I informed the patient that I have reviewed their record in Epic and presented the opportunity for them to ask any questions regarding the visit today.  The patient agreed to participate.    6. Time spent        Assessment & Plan     * Vertigo  Assessment & Plan  Vertigo, most likely peripheral given the lack of focal complaints or deficits, normotension, latency of symptoms with respect to movement, and 100% complete alleviation of symptoms at rest.  Also test of skew is negative as per ED provider.  -Symptoms are significantly improved, this is also fitting with typical BPPV timeframe.  - No clear need for additional imaging or workup at this time.  - Okay to discharge from neurologic standpoint.  - Consider a few pills of meclizine as needed on discharge.  - No clear need for new outpatient neurologic follow-up at this time.  - Please call question/concerns          Kylee De Paz will not need outpatient follow up with Neurology. She will not require outpatient neurological testing.    History of Present Illness     Reason for Consult / Principal Problem: Dizziness.  Hx and PE limited by: Not applicable  HPI: Kylee De Paz is a 54 y.o. right handed female with obesity, depression, chronic back pain, right carpal tunnel syndrome, and anxiety who presents with vertigo.    -Patient reports symptom onset last night while in bed, significantly worse when she attempted to get up.  Patient described room spinning vertigo with  associated nausea and diaphoresis.  She was eventually able to fall asleep while laying on her back.  This morning patient still noted that vertigo however it was improved.  This morning she was also nauseous and had an episode of emesis.  - Currently symptoms have improved further, she still has some mild dizziness on head movement, and mild headache.  But no nausea, queasiness or diaphoresis.  - Patient admits to latency of symptoms associated with movement consistent with BPPV.  - Patient denies any focal numbness, tingling, heaviness, clumsiness of an arm or leg, change in vision, diplopia, blurry vision, or change in hearing, or tinnitus.    Consult to neurology  Consult performed by: Greg Del Toro DO  Consult ordered by: Annalee Lucia PA-C           Review of Systems   Constitutional:  Negative for chills and fever.   HENT:  Negative for facial swelling and hearing loss.    Eyes:  Negative for pain and discharge.   Respiratory:  Negative for cough, choking and shortness of breath.    Cardiovascular:  Negative for chest pain.   Gastrointestinal:  Negative for constipation, diarrhea, nausea and vomiting.   Endocrine: Negative for cold intolerance and heat intolerance.   Genitourinary:  Negative for difficulty urinating, frequency and urgency.   Skin:  Negative for color change and rash.   Neurological:  Positive for dizziness. Negative for facial asymmetry, weakness, light-headedness, numbness and headaches.   All other systems reviewed and are negative.      Historical Information   Past Medical History:   Diagnosis Date    Allergic 2019    Supha drugs    Anxiety     Carpal tunnel syndrome     right wrist    Chronic back pain     Depression     Obesity (BMI 30.0-34.9) 11/03/2020     Past Surgical History:   Procedure Laterality Date    CARPAL TUNNEL RELEASE Left 10/2021    NO PAST SURGERIES       Social History   Social History     Substance and Sexual Activity   Alcohol Use Yes    Alcohol/week: 4.0  "standard drinks of alcohol    Types: 4 Glasses of wine per week    Comment: socially     Social History     Substance and Sexual Activity   Drug Use Never    Comment: Denied drug use     E-Cigarette/Vaping    E-Cigarette Use Never User      E-Cigarette/Vaping Substances    Nicotine No     THC No     CBD No     Flavoring No     Other No     Unknown No      Social History     Tobacco Use   Smoking Status Never   Smokeless Tobacco Never     Family History:   Family History   Problem Relation Age of Onset    Hypertension Mother     Vision loss Mother         UNSURE WHY    Macular degeneration Mother     Alcohol abuse Father     Mental illness Father     Schizophrenia Father     Breast cancer Maternal Aunt        Review of previous medical records was  completed.     Meds/Allergies   all current active meds have been reviewed    Allergies   Allergen Reactions    Bactrim [Sulfamethoxazole-Trimethoprim] Hives       Objective   Vitals:Blood pressure 118/67, pulse 60, temperature 97.8 °F (36.6 °C), temperature source Oral, resp. rate 16, height 5' 4\" (1.626 m), weight 70.4 kg (155 lb 3.3 oz), SpO2 98%, not currently breastfeeding.,Body mass index is 26.64 kg/m².    Intake/Output Summary (Last 24 hours) at 8/24/2024 1516  Last data filed at 8/24/2024 1441  Gross per 24 hour   Intake 2050 ml   Output --   Net 2050 ml       Invasive Devices:   Invasive Devices       Peripheral Intravenous Line  Duration             Peripheral IV 08/24/24 Right;Ventral (anterior);Proximal Antecubital <1 day                    Physical Exam  Constitutional:       General: She is not in acute distress.     Appearance: Normal appearance. She is normal weight. She is not ill-appearing, toxic-appearing or diaphoretic.   HENT:      Head: Normocephalic.      Right Ear: External ear normal.      Left Ear: External ear normal.   Eyes:      General: No scleral icterus.        Right eye: No discharge.         Left eye: No discharge.      Extraocular " Movements: Extraocular movements intact.      Conjunctiva/sclera: Conjunctivae normal.      Pupils: Pupils are equal, round, and reactive to light.   Pulmonary:      Effort: Pulmonary effort is normal. No respiratory distress.      Breath sounds: No stridor.   Skin:     Coloration: Skin is not jaundiced or pale.   Neurological:      General: No focal deficit present.      Mental Status: She is alert.      Cranial Nerves: No cranial nerve deficit.      Sensory: No sensory deficit.      Motor: No weakness.      Coordination: Coordination normal.   Psychiatric:         Mood and Affect: Mood normal.         Behavior: Behavior normal.         Thought Content: Thought content normal.         Judgment: Judgment normal.       Neurologic Exam     Mental Status   Level of consciousness:  - awake and alert    Language:  -fluent, comprehension intact      Visual-spatial:  - no clear signs of hemineglect.  -follow commands equally on both sides.  -     Cranial Nerves     CN III, IV, VI   Pupils are equal, round, and reactive to light.  Pupils are equal and round  Extraocular muscles intact, gaze conjugate.  Face appears symmetric with respect to motor.  Tongue is midline.  Shoulder shrug is symmetric.       Motor Exam Patient moves all 4 extremities equally without drift.  Bulk appears normal     Gait, Coordination, and Reflexes  no gross ataxia in any limb.  Finger-to-nose was intact and symmetric  Gait was deferred       Lab Results: I have personally reviewed pertinent reports.    Imaging Studies: I have personally reviewed pertinent reports.   and I have personally reviewed pertinent films in PACS  EKG, Pathology, and Other Studies: I have personally reviewed pertinent reports.    VTE Prophylaxis: Sequential compression device (Venodyne)     Code Status: Level 1 - Full Code  Advance Directive and Living Will:      Power of :    POLST:      Counseling / Coordination of Care  N/A

## 2024-08-24 NOTE — NURSING NOTE
Pt DC to home via friend in stable condition. All belongings packed. IV catheter removed fully intact. AVS reviewed with pt, and all questions answered.

## 2024-08-25 LAB
ATRIAL RATE: 55 BPM
ATRIAL RATE: 59 BPM
P AXIS: 29 DEGREES
P AXIS: 46 DEGREES
PR INTERVAL: 184 MS
PR INTERVAL: 184 MS
QRS AXIS: 42 DEGREES
QRS AXIS: 59 DEGREES
QRSD INTERVAL: 76 MS
QRSD INTERVAL: 80 MS
QT INTERVAL: 474 MS
QT INTERVAL: 478 MS
QTC INTERVAL: 457 MS
QTC INTERVAL: 469 MS
T WAVE AXIS: 37 DEGREES
T WAVE AXIS: 57 DEGREES
VENTRICULAR RATE: 55 BPM
VENTRICULAR RATE: 59 BPM

## 2024-08-25 PROCEDURE — 93010 ELECTROCARDIOGRAM REPORT: CPT | Performed by: INTERNAL MEDICINE

## 2024-08-25 NOTE — UTILIZATION REVIEW
Initial Clinical Review    Admission: Date/Time/Statement:   Admission Orders (From admission, onward)       Ordered        08/24/24 1423  INPATIENT ADMISSION  Once                          Orders Placed This Encounter   Procedures    INPATIENT ADMISSION     Standing Status:   Standing     Number of Occurrences:   1     Order Specific Question:   Level of Care     Answer:   Med Surg [16]     Order Specific Question:   Estimated length of stay     Answer:   More than 2 Midnights     Order Specific Question:   Certification     Answer:   I certify that inpatient services are medically necessary for this patient for a duration of greater than two midnights. See H&P and MD Progress Notes for additional information about the patient's course of treatment.     ED Arrival Information       Expected   -    Arrival   8/24/2024 09:58    Acuity   Urgent              Means of arrival   Walk-In    Escorted by   Spouse    Service   Hospitalist    Admission type   Emergency              Arrival complaint   dizziness, nuasea, sweating             Chief Complaint   Patient presents with    Dizziness     Pt reports dizziness w/ nauesa that started last night while laying in bed       Initial Presentation: 54 y.o. female presents to the ED from home with c/o dizziness and dysuria x 1 wk.  Was at an amusement park during the day on a ride and had extreme dizziness after.  Symptoms improved then woke with severe dizziness, room spinning, worsening with head movements. PMH: asthma, depression.  In the ED VS stable.  Labs - abnormal UA. Imaging - multiple R thyroid nodules. Treated with IV fluids, IV Zofran x 2, IV Valium, IV antibiotics, PO Meclizine. On exam dizziness had resolved.  Neuro was consulted and recommended no additional work up.  Admitted to INPATIENT status with Vertigo, UTI - pt was d/c home in stable condition on PO antibiotics and OP PT eval.      Anticipated Length of Stay/Certification Statement: Patient will be  admitted on an observation basis with an anticipated length of stay of less than 2 midnights secondary to dizziness.     8/24 Neuro TeleHealth Consult - Vertigo, most likely peripheral given the lack of focal complaints or deficits, normotension, latency of symptoms with respect to movement, and 100% complete alleviation of symptoms at rest. Also test of skew is negative as per ED provider.  Symptoms improved.  Typical BPPV timeframe. No need additional testing, ok for d/c, can provide a few doses of PRN Meclizine.       ED Triage Vitals [08/24/24 1010]   Temperature Pulse Respirations Blood Pressure SpO2 Pain Score   98.1 °F (36.7 °C) 61 18 144/79 98 % No Pain     Weight (last 2 days) before discharge       Date/Time Weight    08/24/24 14:55:04 70.4 (155.2)    08/24/24 1010 70.3 (155)            Vital Signs (last 3 days) before discharge       Date/Time Temp Pulse Resp BP MAP (mmHg) SpO2 O2 Device Patient Position - Orthostatic VS Pain    08/24/24 1548 -- -- -- -- -- 97 % None (Room air) -- No Pain    08/24/24 14:55:04 97.8 °F (36.6 °C) 60 16 118/67 84 98 % None (Room air) Sitting --    08/24/24 1230 -- 57 15 130/58 84 95 % None (Room air) Sitting --    08/24/24 1030 -- 58 16 131/78 99 97 % None (Room air) Lying --    08/24/24 1010 98.1 °F (36.7 °C) 61 18 144/79 -- 98 % None (Room air) Sitting No Pain              Pertinent Labs/Diagnostic Test Results:   Radiology:  CTA head and neck with and without contrast   Final Interpretation by Pallav N Shah, MD (08/24 1159)      CT Brain:  No acute intracranial abnormality. 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 Nonemergent MR imaging with and without contrast could be performed for complete    characterization of the pineal lesion.      CT Angiography:  Unremarkable CTA neck and brain.      Heterogeneous thyroid gland with multiple nodules in the right thyroid lobe; the largest measuring 1.2 cm. No  additional sonographic work-up is recommended at this time.     Cardiology:  ECG 12 lead   Final Result by Sourav Johnson DO (08/25 0734)   Sinus bradycardia   Prolonged QT   Abnormal ECG   When compared with ECG of 24-AUG-2024 10:38, (unconfirmed)   No significant change was found   Confirmed by Sourav Johnson ([id number]) on 8/25/2024 7:34:37 AM      ECG 12 lead   Final Result by Sourav Johnson DO (08/25 0734)   Sinus bradycardia   Prolonged QT   No previous ECGs available   Confirmed by Sourav Johnson ([id number]) on 8/25/2024 7:34:16 AM        GI:  No orders to display           Results from last 7 days   Lab Units 08/24/24  1031   WBC Thousand/uL 7.18   HEMOGLOBIN g/dL 14.2   HEMATOCRIT % 43.3   PLATELETS Thousands/uL 272   TOTAL NEUT ABS Thousands/µL 3.55         Results from last 7 days   Lab Units 08/24/24  1031   SODIUM mmol/L 138   POTASSIUM mmol/L 3.5   CHLORIDE mmol/L 105   CO2 mmol/L 25   ANION GAP mmol/L 8   BUN mg/dL 19   CREATININE mg/dL 0.60   EGFR ml/min/1.73sq m 102   CALCIUM mg/dL 9.4   MAGNESIUM mg/dL 2.1     Results from last 7 days   Lab Units 08/24/24  1031   AST U/L 25   ALT U/L 19   ALK PHOS U/L 67   TOTAL PROTEIN g/dL 6.9   ALBUMIN g/dL 4.4   TOTAL BILIRUBIN mg/dL 0.84         Results from last 7 days   Lab Units 08/24/24  1031   GLUCOSE RANDOM mg/dL 147*     Results from last 7 days   Lab Units 08/24/24  1421 08/24/24  1242 08/24/24  1031   HS TNI 0HR ng/L  --   --  21   HS TNI 2HR ng/L  --  24  --    HSTNI D2 ng/L  --  3  --    HS TNI 4HR ng/L 21  --   --    HSTNI D4 ng/L 0  --   --              Results from last 7 days   Lab Units 08/24/24  1031   TSH 3RD GENERATON uIU/mL 1.724     Results from last 7 days   Lab Units 08/24/24  1354   CLARITY UA  Clear   COLOR UA  Yellow   SPEC GRAV UA  <=1.005*   PH UA  6.0   GLUCOSE UA mg/dl Negative   KETONES UA mg/dl Negative   BLOOD UA  Negative   PROTEIN UA mg/dl Negative   NITRITE UA  Positive*   BILIRUBIN UA  Negative   UROBILINOGEN UA E.U./dl 0.2    LEUKOCYTES UA  Negative   WBC UA /hpf 1-2   RBC UA /hpf None Seen   BACTERIA UA /hpf Moderate*   EPITHELIAL CELLS WET PREP /hpf Occasional     ED Treatment-Medication Administration from 08/24/2024 0958 to 08/24/2024 1449         Date/Time Order Dose Route Action     08/24/2024 1033 sodium chloride 0.9 % bolus 1,000 mL 1,000 mL Intravenous New Bag     08/24/2024 1040 ondansetron (ZOFRAN) injection 4 mg 4 mg Intravenous Given     08/24/2024 1103 meclizine (ANTIVERT) tablet 25 mg 25 mg Oral Given     08/24/2024 1115 iohexol (OMNIPAQUE) 350 MG/ML injection (MULTI-DOSE) 85 mL 85 mL Intravenous Given     08/24/2024 1205 ondansetron (ZOFRAN) injection 4 mg 4 mg Intravenous Given     08/24/2024 1204 sodium chloride 0.9 % bolus 1,000 mL 1,000 mL Intravenous New Bag     08/24/2024 1206 diazepam (VALIUM) injection 5 mg 5 mg Intravenous Given     08/24/2024 1421 cefTRIAXone (ROCEPHIN) IVPB (premix in dextrose) 1,000 mg 50 mL 1,000 mg Intravenous New Bag            Past Medical History:   Diagnosis Date    Allergic 2019    Supha drugs    Anxiety     Carpal tunnel syndrome     right wrist    Chronic back pain     Depression     Obesity (BMI 30.0-34.9) 11/03/2020     Present on Admission:   Mild intermittent asthma without complication   Anxiety with depression      Admitting Diagnosis: Dizziness [R42]  UTI (urinary tract infection) [N39.0]  Nausea & vomiting [R11.2]  Age/Sex: 54 y.o. female  Admission Orders:  Scheduled Medications:      Continuous IV Infusions:      PRN Meds:    Macrobid  Meclizine  IP CONSULT TO NEUROLOGY    Network Utilization Review Department  ATTENTION: Please call with any questions or concerns to [phone] and carefully listen to the prompts so that you are directed to the right person. All voicemails are confidential.   For Discharge needs, contact Care Management DC Support Team at [phone] opt. 2  Send all requests for admission clinical reviews, approved or denied determinations and any  other requests to dedicated fax number below belonging to the campus where the patient is receiving treatment. List of dedicated fax numbers for the Facilities:  FACILITY NAME UR FAX NUMBER   ADMISSION DENIALS (Administrative/Medical Necessity) [phone]   DISCHARGE SUPPORT TEAM (NETWORK) [phone]   PARENT CHILD HEALTH (Maternity/NICU/Pediatrics) [phone]   [facility] [phone]   [facility] [phone]   [facility] [phone]   [facility] [phone]   [facility] [phone]   [facility] [phone]   [facility] [phone]   [facility] [phone]   [facility] [phone]   [facility] [phone]   [facility] [phone]   [facility] [phone]

## 2024-08-26 ENCOUNTER — TRANSITIONAL CARE MANAGEMENT (OUTPATIENT)
Dept: INTERNAL MEDICINE CLINIC | Facility: OTHER | Age: 54
End: 2024-08-26

## 2024-08-26 ENCOUNTER — OFFICE VISIT (OUTPATIENT)
Dept: INTERNAL MEDICINE CLINIC | Age: 54
End: 2024-08-26
Payer: COMMERCIAL

## 2024-08-26 VITALS
OXYGEN SATURATION: 99 % | HEIGHT: 64 IN | BODY MASS INDEX: 27.49 KG/M2 | DIASTOLIC BLOOD PRESSURE: 76 MMHG | SYSTOLIC BLOOD PRESSURE: 112 MMHG | HEART RATE: 68 BPM | TEMPERATURE: 97.2 F | WEIGHT: 161 LBS

## 2024-08-26 DIAGNOSIS — R42 VERTIGO: Primary | ICD-10-CM

## 2024-08-26 DIAGNOSIS — E34.8 PINEAL GLAND CYST: ICD-10-CM

## 2024-08-26 DIAGNOSIS — E04.1 THYROID NODULE: ICD-10-CM

## 2024-08-26 DIAGNOSIS — Z12.31 ENCOUNTER FOR SCREENING MAMMOGRAM FOR BREAST CANCER: ICD-10-CM

## 2024-08-26 PROCEDURE — 99214 OFFICE O/P EST MOD 30 MIN: CPT

## 2024-08-26 NOTE — PROGRESS NOTES
Transition of Care Visit  Name: Kylee De Paz      : 1970      MRN: [id number]  Encounter Provider: Edwardo Alberts MD  Encounter Date: 2024   Encounter department: [facility]    Assessment & Plan   1. Vertigo  Assessment & Plan:  - recent ED visit for dizziness and nausea after going [place] amusement [place]  - diagnosed with vertigo, given meclizine and zofran; patient has not taken meclizine yet due to wanting to follow with PCP first  - Wali-Hallpike maneuver +  - referred to PT for vestibular rehabilitation treatment  - instructed to take meclizine for breakthrough cases    Orders:  -     Ambulatory Referral to Physical Therapy; Future  2. Encounter for screening mammogram for breast cancer  3. Thyroid nodule  Assessment & Plan:  - multiple thyroid nodules seen incidentally on CTH   - TSH normal  - thyroid US ordered for evaluation  Orders:  -     US thyroid; Future; Expected date: 2024  4. Pineal gland cyst  Assessment & Plan:  - 2cm pineal gland cyst seen on CT scan  w/ mild mass effect on tectum  - MRI brain ordered for further evaluation  Orders:  -     MRI brain w contrast; Future; Expected date: 2024         History of Present Illness     Transitional Care Management Review:   Kylee DeP az is a 54 y.o. female here for TCM follow up.     During the TCM phone call patient stated:  TCM Call       None          TCM Call       None          Patient is a 53 y/o F w/ PMH depression who is coming in after an ED visit for dizziness and nausea. She states that she was at an amusement park the day before her ED visit and had gone on a few rides. While on one of the rides she felt dizziness, but it went away. Later that night she started to feel dizzy again while lying down and also had nausea. She went to the hospital and was diagnosed with vertigo. She was given zofran and meclizine for the nausea and vertigo, respectively, which  "improved her symptoms. She states that she has not taken the meclizine due to wanting to follow-up with her PCP before starting it. She is currently complaining of light-headedness and headache. It is worse with bending over or when turning her head sometimes. She denies any fevers, seizure, or neck pain.     Patient also was found to have UTI in the ED and is currently being treated with 5 days of Macrobid. She states that her dysuria has gone away.     While in the ED she had a CT scan of her head performed which showed incidental findings of 2cm pineal gland cyst and multiple thyroid nodules. TSH was normal at the time.          Review of Systems   Constitutional:  Negative for chills, fatigue and fever.   HENT:  Negative for ear discharge, ear pain, hearing loss and sore throat.    Respiratory:  Negative for chest tightness and shortness of breath.    Cardiovascular:  Negative for chest pain.   Gastrointestinal:  Positive for nausea. Negative for abdominal distention, abdominal pain, blood in stool, diarrhea and vomiting.   Endocrine: Positive for polyuria. Negative for polydipsia and polyphagia.   Genitourinary:  Negative for difficulty urinating and dysuria.   Musculoskeletal:  Negative for back pain, neck pain and neck stiffness.   Skin:  Negative for rash.   Neurological:  Positive for dizziness, light-headedness and headaches. Negative for seizures.     Objective     /76 (BP Location: Left arm, Patient Position: Sitting, Cuff Size: Standard)   Pulse 68   Temp (!) 97.2 °F (36.2 °C) (Temporal)   Ht 5' 4\" (1.626 m)   Wt 73 kg (161 lb)   SpO2 99%   BMI 27.64 kg/m²     Physical Exam  Constitutional:       Appearance: Normal appearance.   HENT:      Head: Normocephalic and atraumatic.      Nose: Nose normal.      Mouth/Throat:      Mouth: Mucous membranes are moist.   Eyes:      Extraocular Movements: Extraocular movements intact.      Pupils: Pupils are equal, round, and reactive to light. "   Cardiovascular:      Rate and Rhythm: Normal rate and regular rhythm.      Pulses: Normal pulses.      Heart sounds: Normal heart sounds. No murmur heard.     No friction rub. No gallop.   Pulmonary:      Effort: Pulmonary effort is normal. No respiratory distress.      Breath sounds: Normal breath sounds. No wheezing, rhonchi or rales.   Abdominal:      General: Abdomen is flat. Bowel sounds are normal. There is no distension.      Palpations: Abdomen is soft.      Tenderness: There is no abdominal tenderness. There is no guarding.      Hernia: No hernia is present.   Skin:     General: Skin is warm.      Capillary Refill: Capillary refill takes less than 2 seconds.   Neurological:      Mental Status: She is alert and oriented to person, place, and time. Mental status is at baseline.      Cranial Nerves: Cranial nerves 2-12 are intact.      Sensory: Sensation is intact.      Motor: Motor function is intact. No weakness.      Coordination: Coordination is intact.      Comments: Wali-Hallpike maneuver +   Psychiatric:         Mood and Affect: Mood normal.         Behavior: Behavior normal.       Medications have been reviewed by provider in current encounter    Administrative Statements

## 2024-08-26 NOTE — ASSESSMENT & PLAN NOTE
- multiple thyroid nodules seen incidentally on CTH 8/24  - TSH normal  - thyroid US ordered for evaluation

## 2024-08-26 NOTE — ASSESSMENT & PLAN NOTE
- 2cm pineal gland cyst seen on CT scan 8/24 w/ mild mass effect on tectum  - MRI brain ordered for further evaluation

## 2024-08-26 NOTE — PATIENT INSTRUCTIONS
- Referral to PT for vertigo. They should be able to treat you.  - Thyroid ultrasound ordered for thyroid nodules  - MRI ordered for pineal cyst  - Follow-up in 1 month

## 2024-08-26 NOTE — ASSESSMENT & PLAN NOTE
- recent ED visit for dizziness and nausea after going on a ride at an amusement park  - diagnosed with vertigo, given meclizine and zofran; patient has not taken meclizine yet due to wanting to follow with PCP first  - [place]-Hallpike maneuver +  - referred to PT for vestibular rehabilitation treatment  - instructed to take meclizine for breakthrough cases

## 2024-08-27 ENCOUNTER — TELEPHONE (OUTPATIENT)
Dept: INTERNAL MEDICINE CLINIC | Age: 54
End: 2024-08-27

## 2024-08-27 DIAGNOSIS — R42 VERTIGO: Primary | ICD-10-CM

## 2024-08-27 RX ORDER — ONDANSETRON 4 MG/1
4 TABLET, FILM COATED ORAL EVERY 8 HOURS PRN
Qty: 20 TABLET | Refills: 0 | Status: SHIPPED | OUTPATIENT
Start: 2024-08-27 | End: 2024-08-28 | Stop reason: SDUPTHER

## 2024-08-27 NOTE — TELEPHONE ENCOUNTER
Patient states that she is still feeling nausea after recent hospitalization for vertigo. She was given Zofran in the hospital which helped. She did not receive Zofran after PCP visit. I spoke with patient on the phone and restarted her on Zofran PRN.

## 2024-08-28 DIAGNOSIS — R42 VERTIGO: ICD-10-CM

## 2024-08-28 RX ORDER — ONDANSETRON 4 MG/1
4 TABLET, FILM COATED ORAL EVERY 8 HOURS PRN
Qty: 20 TABLET | Refills: 0 | Status: SHIPPED | OUTPATIENT
Start: 2024-08-28

## 2024-08-28 NOTE — TELEPHONE ENCOUNTER
Reason for call:   [x] Refill   [] Prior Auth  [] Other:     Office:   [x] PCP/Provider -   [] Specialty/Provider -               Does the patient have enough for 3 days?   [] Yes   [] No - Send as HP to POD

## 2024-08-29 ENCOUNTER — TELEPHONE (OUTPATIENT)
Dept: INTERNAL MEDICINE CLINIC | Facility: OTHER | Age: 54
End: 2024-08-29

## 2024-08-29 DIAGNOSIS — N30.00 ACUTE CYSTITIS WITHOUT HEMATURIA: Primary | ICD-10-CM

## 2024-08-29 DIAGNOSIS — R42 VERTIGO: ICD-10-CM

## 2024-08-29 RX ORDER — FLUCONAZOLE 150 MG/1
150 TABLET ORAL ONCE
Qty: 1 TABLET | Refills: 0 | Status: SHIPPED | OUTPATIENT
Start: 2024-08-29 | End: 2024-08-29

## 2024-08-29 RX ORDER — CEPHALEXIN 500 MG/1
500 CAPSULE ORAL EVERY 8 HOURS SCHEDULED
Qty: 9 CAPSULE | Refills: 0 | Status: SHIPPED | OUTPATIENT
Start: 2024-08-29 | End: 2024-09-01

## 2024-08-29 NOTE — TELEPHONE ENCOUNTER
Last time when she was seen urine cultures were not done right now it is not a good time to get the urine sample because it will be negative or falsely negative.  I will send a prescription for a Diflucan for vaginal itching possible yeast infection and 3 days of Keflex and we will see how she does

## 2024-08-29 NOTE — TELEPHONE ENCOUNTER
Pt was prescribed macrobid in hosp for UTI and pt states she finished her last dose today and she is still having sxs.  Pt is experiencing dysuria, frequency and itching.  Please advise.  Pt is able to go to a [facility] to provide another urine if needed.

## 2024-08-30 ENCOUNTER — HOSPITAL ENCOUNTER (OUTPATIENT)
Dept: MRI IMAGING | Facility: HOSPITAL | Age: 54
End: 2024-08-30
Payer: COMMERCIAL

## 2024-08-30 ENCOUNTER — HOSPITAL ENCOUNTER (OUTPATIENT)
Dept: ULTRASOUND IMAGING | Facility: HOSPITAL | Age: 54
End: 2024-08-30
Payer: COMMERCIAL

## 2024-08-30 ENCOUNTER — EVALUATION (OUTPATIENT)
Dept: PHYSICAL THERAPY | Facility: CLINIC | Age: 54
End: 2024-08-30
Payer: COMMERCIAL

## 2024-08-30 DIAGNOSIS — E04.1 THYROID NODULE: ICD-10-CM

## 2024-08-30 DIAGNOSIS — R42 VERTIGO: ICD-10-CM

## 2024-08-30 DIAGNOSIS — E34.8 PINEAL GLAND CYST: ICD-10-CM

## 2024-08-30 PROCEDURE — 76536 US EXAM OF HEAD AND NECK: CPT

## 2024-08-30 PROCEDURE — 97161 PT EVAL LOW COMPLEX 20 MIN: CPT

## 2024-08-30 PROCEDURE — 97112 NEUROMUSCULAR REEDUCATION: CPT

## 2024-08-30 PROCEDURE — A9585 GADOBUTROL INJECTION: HCPCS | Performed by: INTERNAL MEDICINE

## 2024-08-30 PROCEDURE — 70553 MRI BRAIN STEM W/O & W/DYE: CPT

## 2024-08-30 RX ORDER — GADOBUTROL 604.72 MG/ML
7 INJECTION INTRAVENOUS
Status: COMPLETED | OUTPATIENT
Start: 2024-08-30 | End: 2024-08-30

## 2024-08-30 RX ADMIN — GADOBUTROL 7 ML: 604.72 INJECTION INTRAVENOUS at 19:15

## 2024-08-30 NOTE — PROGRESS NOTES
PT Evaluation     Today's date: 2024  Patient name: Kylee De Paz  : 1970  MRN: [id number]  Referring provider: Temo Silver MD  Dx:   Encounter Diagnosis     ICD-10-CM    1. Vertigo  R42 Ambulatory Referral to Physical Therapy                     Assessment  Impairments: activity intolerance, lacks appropriate home exercise program, safety issue, participation limitations and activity limitations  Other impairment: dizziness    Assessment details: Kylee De Paz is a pleasant 54 y.o. female who presents with room spinning dizziness that started approximately 1 weeks ago. Triggering events include: rolling in bed; supine to/from sitting; quick movements of her head to the L, and bending over. Her symptoms last for less than a minute when they are triggered. Due to this, she occasionally feels off-balance while walking.    PT examination findings include: Dizziness Handicap Index score of 30/100 indicating mild to moderate self-perceived impairment; symptoms with cervical extension and bilateral rotation; and L up-beating. These findings are consistent with L posterior semi-circular canal benign paroxysmal positional vertigo.     L Epley maneuver was performed twice and tolerated well with symptoms produced with each position change with significant reduction in 2nd round. She was instructed to try to avoid laying flat and bending down today until she goes to bed today. She verbalized understanding. The patient would benefit from skilled physical therapy to provide canalith repositioning, exercises, neuromuscular reeducation, manual techniques, gait training, and modalities as deemed necessary in order to help her achieve her goals and decrease her dizziness symptoms.   Understanding of Dx/Px/POC: good     Prognosis: good    Goals  STGs in 4 weeks  1. Patient will be independent with HEP (for VOR, movement deficits, and/or balance as needed).  2. Patient will be able to perform all cervical ROM  without provocation of symptoms.  3. Balance testing will be completed prn.    LTGs in 8 weeks  1. Patient will not have any symptom provocation during any BPPV testing.  2. Patient will improve score on Dizziness Handicap Index by at least 17% for decreased perception of disability (17% is minimal detectable change).  3. Patient will be able to complete all bed mobility and household tasks without provocation of symptoms for improved QOL.     Plan  Patient would benefit from: PT eval and skilled physical therapy    Planned therapy interventions: balance, neuromuscular re-education, canalith repositioning, patient/caregiver education, postural training, strengthening, stretching, therapeutic activities, therapeutic exercise, graded exercise and home exercise program    Frequency: 2x week  Duration in weeks: 8  Plan of Care beginning date: 8/30/2024  Plan of Care expiration date: 10/30/2024  Treatment plan discussed with: patient      Subjective Evaluation    History of Present Illness  Mechanism of injury: Kylee states that she started experiencing vertigo symptoms Friday night into Saturday. She describes it as a room spinning dizziness that did initially present with nausea. Eventually went to ER for significant dizziness. EKG, urine test (showed UTI), troponins (elevated 0 and 2 hrs), gave her IV saline and doses of meclizine and zofran and then valium. CT scan showed a cyst on her pineal gland and on her thyroid so having ultrasound and MRI on these. MD do not think they are related to the dizziness.    For the majority of the week, she has felt less dizzy--able to drive. Triggering movements include: getting in/out of bed; turning her head to L. She was at [employer] on Friday and while she was on a ride she started to feel dizzy. She usually rides rides without issues. For the week, she has been feeling more lightheaded. She admits that she needs more hydration. She has not done any exercise this week but  plans to get back into her training for a sprint triathlon.      She has not needed to take the meclizine.   Patient Goals  Patient goal: not be dizzy  Pain  No pain reported          Objective    Dysequilibrium: Yes  Lightheadedness: Yes  Vertigo: Yes  Rocking or Swaying: Yes         Oscillopsia: No  Diplopia: Yes--blurry vision since going on zoloft for the past year (last seen eye MD in march)  Motion sickness: Yes--normal    Exacerbation Factors:  Bending over: Yes  Turning Head: Yes  Rolling in bed: Yes  Walking: No  Looking up: No  Supine to/from sitting: Yes    Duration of Symptoms: a few seconds     Concurrent Complaints:  Tinnitus:No  Aural Fullness:No  Known hearing loss:No  Nausea, Vomiting: Yes  Altered Vision: Yes  Poor Concentration: No  Memory Loss: No  Peripheral Neuropathy:Yes--chronic (carpal tunnel)  Headache: Yes      PHYSICAL FINDINGS:  Oculomotor ROM: WNL  Resting nystagmus: No  Gaze holding nystagmus: No   Smooth pursuit Normal    Vertical Saccades:Normal  Horizontal Saccades:Normal  Convergence: Normal    VOR: Normal    Head thrust (room light): Normal    Dynamic Visual Acuity: NT  Dynamic Head: 20/  Static Head: 20/      MCTSIB: NT      DHI: score 30/100 =  mild to moderate self perceived disability  0-30 mild , 30-60 moderate,  severe disability      Positional testing: Left Right   [place] Camilo pike + with L up-beating torsional nystagmus with severe symptoms negative   Roll test:         Cervical ROM:  WNL in all planes of motion--minor dizziness with ext and bilateral rotation           Short Term Goal Expiration Date:(9/30/2024)  Long Term Goal Expiration Date: (10/30/2024)  POC Expiration Date: (10/30/2024)      POC expires Unit limit Auth Expiration date PT/OT/ST + Visit Limit?   10/30/24  TBD on IE 60 vpcy                             Visit/Unit Tracking  AUTH Status:  Date 8/30             TBD on IE Used 1 IE              Remaining  TBD                   Precautions n/a        Manuals 8/30                       Pt education BPPV diagnosis, prognosis, post maneuver precautions               Neuro Re-Ed         CRM L epley x2 rds    Self epley for HEP                                                       Ther Ex                                                                        Ther Activity                        Gait Training                        Modalities

## 2024-09-05 ENCOUNTER — TELEPHONE (OUTPATIENT)
Age: 54
End: 2024-09-05

## 2024-09-06 ENCOUNTER — OFFICE VISIT (OUTPATIENT)
Dept: PHYSICAL THERAPY | Facility: CLINIC | Age: 54
End: 2024-09-06
Payer: COMMERCIAL

## 2024-09-06 DIAGNOSIS — R42 VERTIGO: Primary | ICD-10-CM

## 2024-09-06 PROCEDURE — 97112 NEUROMUSCULAR REEDUCATION: CPT

## 2024-09-06 NOTE — PROGRESS NOTES
"Daily Note     Today's date: 2024  Patient name: Kylee De Paz  : 1970  MRN: [id number]  Referring provider: Temo Silver MD  Dx:   Encounter Diagnosis     ICD-10-CM    1. Vertigo  R42                      Subjective: Kylee states that overall she is feeling better. No spinning dizziness, just minor lightheadedness.       Objective: See treatment diary below    L Wali-hallpike: 2-3 beats of L up beating torsional nystagmus--very minor symptoms      Assessment: Kylee responded favorably to L epley maneuver on IE with no room spinning dizziness since. L [place]-hallpike was assessed with minor symptoms of lightheadedness and weirdness in the head with 2-3 beats of L up beating torsional nystagmus. L epley was performed twice with no nystagmus on second round and only \"weird\" symptoms remaining. Discussed that these remaining symptoms may be due to vestibular intolerance following BPPV and provided with VOR x1 viewing and spivey daroff exercises for HEP to address these other factors. She verbalized understanding of all recommendations. She would benefit from continued skilled PT to address her remaining symptoms.      Plan: Continue per plan of care.      Short Term Goal Expiration Date:(2024)  Long Term Goal Expiration Date: (10/30/2024)  POC Expiration Date: (10/30/2024)      POC expires Unit limit Auth Expiration date PT/OT/ST + Visit Limit?   10/30/24  TBD on IE 60 vpcy                             Visit/Unit Tracking  AUTH Status:  Date             TBD on IE Used 1 IE 2             Remaining  TBD                   Precautions n/a       Manuals                       Pt education BPPV diagnosis, prognosis, post maneuver precautions               Neuro Re-Ed         CRM L epley x2 rds    Self epley for HEP L epley x2 rds              VOR x1 viewing  HEP      Spivey daroff  HEP                              Ther Ex                                                                      "   Ther Activity                        Gait Training                        Modalities

## 2024-09-19 ENCOUNTER — APPOINTMENT (OUTPATIENT)
Dept: PHYSICAL THERAPY | Facility: CLINIC | Age: 54
End: 2024-09-19
Payer: COMMERCIAL

## 2024-09-20 ENCOUNTER — TELEPHONE (OUTPATIENT)
Age: 54
End: 2024-09-20

## 2024-09-20 ENCOUNTER — OFFICE VISIT (OUTPATIENT)
Dept: GYNECOLOGY | Facility: CLINIC | Age: 54
End: 2024-09-20
Payer: COMMERCIAL

## 2024-09-20 VITALS — DIASTOLIC BLOOD PRESSURE: 62 MMHG | WEIGHT: 164 LBS | SYSTOLIC BLOOD PRESSURE: 114 MMHG | BODY MASS INDEX: 28.15 KG/M2

## 2024-09-20 DIAGNOSIS — Z79.899 LONG-TERM USE OF HIGH-RISK MEDICATION: ICD-10-CM

## 2024-09-20 DIAGNOSIS — G47.9 SLEEP DISTURBANCES: ICD-10-CM

## 2024-09-20 DIAGNOSIS — Z78.0 MENOPAUSE: Primary | ICD-10-CM

## 2024-09-20 DIAGNOSIS — R39.15 URINARY URGENCY: ICD-10-CM

## 2024-09-20 DIAGNOSIS — R23.2 HOT FLASHES: ICD-10-CM

## 2024-09-20 DIAGNOSIS — N89.8 VAGINAL DRYNESS: ICD-10-CM

## 2024-09-20 DIAGNOSIS — R15.2 FECAL URGENCY: ICD-10-CM

## 2024-09-20 DIAGNOSIS — E66.3 OVERWEIGHT (BMI 25.0-29.9): ICD-10-CM

## 2024-09-20 PROCEDURE — 99205 OFFICE O/P NEW HI 60 MIN: CPT | Performed by: OBSTETRICS & GYNECOLOGY

## 2024-09-20 NOTE — PROGRESS NOTES
Assessment/Plan:     I have spent a total time of 64 minutes in caring for this patient on the day of the visit/encounter including Risks and benefits of tx options for hot flashes, vaginal dryness, weight gain, urgency, hair loss, sleep concerns.    Hot flashes-we discussed hot flashes in great detail.    We reviewed prescription medications including SSRI antidepressants, Brisdelle and hormone replacement therap I am James.  She is already on Zoloft so would not add an antidepressant for hot flashes.  Side effects and risks reviewed for all of these options.    She is a good candidate for HRT and also Veozah.  We discussed some other nonhormonal options however I feel that she would have better results from 1 of these 2 options.  Side effects of both were reviewed.  I explained the need to monitor liver enzymes with Veozah and she just had a normal CMP 1 month ago.  After an extensive discussion, she would like to try Veozah as she was considering it before.  I sent a prescription for her and also gave her information to review on all of the above and gave her the discount card.  She is aware that she will need a 3-month follow-up.  I did review all of her medications with Veozah to be sure there is no interaction.  Labs ordered so that she can have her liver enzymes rechecked at 1 to 3 months.  It will then be done at 6 and 9 months.      Sleep disturbances-I gave her some information on sleep hygiene.  I explained that sleep disturbances can be caused by many things that are not menopause related however if we can decrease the frequency of her night sweats, she will most likely have better sleep.    Weight gain-we discussed weight gain as often being from loss of lean muscle mass, being more sedentary and need for dietary changes.  She does seem to get adequate exercise including weight training and weightbearing exercise.  She needs with a  twice a week.  Admittedly, she feels there is room for  improvement in her diet and she would like to meet with someone for guidance in this area.  I did place a referral to weight management so she could have a consult with a dietitian.  She is also going to look into her benefits through work as they offer many preventative health care benefits.  If she can do 1 through her employer, she will cancel the referral.  I did give her some information on the programs available through weight management.    Urgency-she is having urgency of urine and also of stool.  This is concerning to her.  Her mother has had the same problem.  It is not frequent at this point however she would like to do what ever is possible to control this.  We did discuss urinary urgency and overactive bladder.  I explained that there are conservative measures that can be taken that will most likely give her some improvement such as avoiding bladder irritants, Kegels and bladder training.  I gave her information so that she can start working on this.  We also discussed pelvic floor PT as this will also help with these concerns.  It should also help with the fecal urgency.  If this is not providing enough relief, can consider urogyn consult.    Hair loss-we discussed causes for hair loss such as menopause and also stress.  We did discuss  over-the-counter topical treatments briefly.  I recommended that she could talk to her dermatologist.    Vaginal dryness-we discussed treatment for vaginal dryness such as oil-based or silicone based lubricants.  We also discussed vaginal moisturizer that can be used as maintenance and also vaginal estrogen.  For now, I would recommend that she try the over-the-counter, nonhormonal options.  Technically estrogen is contraindicated with Veozah although I feel be interaction with vaginal estrogen would be minimal.  She is agreeable and she was given a list of options to review.    She will return  in 3 months for follow-up.  She was encouraged to call with any  concerns.       There are no diagnoses linked to this encounter.      Subjective:     Patient ID: Kylee De Paz is a 54 y.o. female.    Menopause visit    Patient presents for menopause discussion.  She is new to our practice.  She has been getting regular GYN care at [facility].  She went through menopause spontaneously at age 52.  Menopause was relatively uneventful.  She has been having hot flashes which are extremely uncomfortable and frequent night sweats.  She has had them since she went through menopause and initially they were intermittent but now they are consistent.  She is not sleeping well, mostly because of the disruption from the night sweats.    She was planning to start Veozah with her regular gynecologist however had issues with getting a prescription filled.    She has noted more hair loss over the past several months which is quite noticeable to her.  She is also having vaginal dryness and discomfort with intercourse.    She has concerns about weight gain.  She exercises a great deal and does watch her diet.  She has been training for triathlons and has gained about 12 pounds over the past year.  She feels that she could use some guidance with diet.    Another concern is that she has urinary urgency and occasional urge incontinence and also has fecal urgency and has had to instances of fecal incontinence.  Her mother has had the same thing.  She does not feel that she has to wear a pad every day.    Past OB/GYN history-she had a few abnormal Pap several years ago, had colposcopy.  No treatment required.  Recent vulvar biopsy was benign.  Vaginal delivery x 1, 6 pounds 4 ounces.  She is getting regular mammograms.  She has been recalled but has not needed any biopsies.  She was on oral contraceptives for several years with no issues.  No history of blood clot, she is a non-smoker, has headaches but no history of migraines.    Past medical history and surgical history reviewed.          Review  of Systems   Constitutional:  Positive for unexpected weight change.        Difficulty with weight loss   Endocrine: Positive for heat intolerance.   Genitourinary:  Positive for dyspareunia and urgency.   Skin:         Hair loss   Neurological:  Positive for dizziness.        Pt has vertigo   Psychiatric/Behavioral:  Positive for sleep disturbance.          Objective:     Physical Exam

## 2024-09-20 NOTE — TELEPHONE ENCOUNTER
Patient called about results. She is looking to know what the recommendations, or advise would be. Please advise.

## 2024-09-23 ENCOUNTER — OFFICE VISIT (OUTPATIENT)
Dept: PHYSICAL THERAPY | Facility: CLINIC | Age: 54
End: 2024-09-23
Payer: COMMERCIAL

## 2024-09-23 DIAGNOSIS — R93.89 ABNORMAL THYROID ULTRASOUND: Primary | ICD-10-CM

## 2024-09-23 DIAGNOSIS — R42 VERTIGO: Primary | ICD-10-CM

## 2024-09-23 PROBLEM — N39.0 UTI (URINARY TRACT INFECTION): Status: RESOLVED | Noted: 2024-08-24 | Resolved: 2024-09-23

## 2024-09-23 PROCEDURE — 97112 NEUROMUSCULAR REEDUCATION: CPT

## 2024-09-23 NOTE — TELEPHONE ENCOUNTER
Gastroenterology Nephrology Patient received a letter in the mail from [facility] stating to follow up with primary care in 3-5 months. Patient has follow up in office on 11/19. Patient is under the impression she would need to follow up with testing in 3-5 months per letter. Explained per radiologist on US results, that patient is to follow up with [facility] in 12 months. Patient is aware to complete labs         Cardiology Cardiology Cardiology Nephrology Nephrology Internal Medicine Internal Medicine Internal Medicine

## 2024-09-23 NOTE — PROGRESS NOTES
Daily Note     Today's date: 2024  Patient name: Kylee De Paz  : 1970  MRN: [id number]  Referring provider: Temo Silver MD  Dx:   Encounter Diagnosis     ICD-10-CM    1. Vertigo  R42                      Subjective: Kylee states that on Wednesday, her dizziness started to get worse again. She says its the lightheaded and minor room spinning sensation. Comes on with standing up, laying down. She has not been doing the exercises.       Objective: See treatment diary below    L Gianni-hallpike: positive with room spinning symptoms and L up-beating torsional nystagmus  R [place]-hallpike: negative with minor lightheaded symptoms      Assessment: Kylee had a recurrence of her dizziness symptoms over the past week. L [place]-hallpike remains positive with moderate dizziness symptoms and brief episode of L up-beating torsional nystagmus. L epley was performed 3 times with reduction in symptoms from round to round with minimal symptoms remaining during final round. No nystagmus was observed in the final round of L epley in the gianni-hallpike position. She was encouraged to resume performing self-epleys at home to address her dizziness symptoms. Also discussed sleeping position as prolonged L sidelying can contribute to BPPV and she will attempt to sleep in a different position. She would benefit from continued skilled PT to address her symptoms.      Plan: Continue per plan of care.  Progress treatment as tolerated.       Short Term Goal Expiration Date:(2024)  Long Term Goal Expiration Date: (10/30/2024)  POC Expiration Date: (10/30/2024)      POC expires Unit limit Auth Expiration date PT/OT/ST + Visit Limit?   10/30/24  12/31/24 60 vpcy                             Visit/Unit Tracking  AUTH Status:  Date            60 visits pcy Used 1 IE 2 3            Remaining  59 58 57                 Precautions n/a       Manuals                      Pt education BPPV diagnosis, prognosis,  post maneuver precautions               Neuro Re-Ed         CRM L epley x2 rds    Self epley for HEP L epley x2 rds L epley x3 rds             VOR x1 viewing  HEP      Patric wolf  HEP                              Ther Ex                                                                        Ther Activity                        Gait Training                        Modalities

## 2024-09-23 NOTE — TELEPHONE ENCOUNTER
I ordered the parathyroid hormone level ultrasound of the thyroid was essentially unremarkable need follow-up in 1 year

## 2024-10-02 ENCOUNTER — APPOINTMENT (OUTPATIENT)
Dept: PHYSICAL THERAPY | Facility: CLINIC | Age: 54
End: 2024-10-02
Payer: COMMERCIAL

## 2024-10-02 NOTE — PROGRESS NOTES
Daily Note     Today's date: 10/2/2024  Patient name: Kylee De Paz  : 1970  MRN: [id number]  Referring provider: Temo Silver MD  Dx:   Encounter Diagnosis     ICD-10-CM    1. Vertigo  R42                      Subjective: ***      Objective: See treatment diary below    L [place]-hallpike: ***      Assessment: Tolerated treatment {Tolerated treatment :}. Patient {assessment:}      Plan: {PLAN:[id number]}     Short Term Goal Expiration Date:(2024)  Long Term Goal Expiration Date: (10/30/2024)  POC Expiration Date: (10/30/2024)      POC expires Unit limit Auth Expiration date PT/OT/ST + Visit Limit?   10/30/24  12/31/24 60 vpcy                             Visit/Unit Tracking  AUTH Status:  Date 8/30 9/6 9/23 10/2***          60 visits pcy Used 1 IE 2 3 4           Remaining  59 58 57 56                Precautions n/a       Manuals 8/30 9/6 9/23 10/2                    Pt education BPPV diagnosis, prognosis, post maneuver precautions               Neuro Re-Ed         CRM L epley x2 rds    Self epley for HEP L epley x2 rds L epley x3 rds L epley ***    L semont ***            VOR x1 viewing  HEP      Patric wolf  HEP                              Ther Ex                                                                        Ther Activity                        Gait Training                        Modalities

## 2024-10-07 ENCOUNTER — OFFICE VISIT (OUTPATIENT)
Dept: PHYSICAL THERAPY | Facility: CLINIC | Age: 54
End: 2024-10-07
Payer: COMMERCIAL

## 2024-10-07 DIAGNOSIS — R42 VERTIGO: Primary | ICD-10-CM

## 2024-10-07 PROCEDURE — 97112 NEUROMUSCULAR REEDUCATION: CPT

## 2024-10-07 NOTE — PROGRESS NOTES
PT Progress Note     Today's date: 10/7/2024  Patient name: Kylee De Paz  : 1970  MRN: [id number]  Referring provider: Temo Silver MD  Dx:   Encounter Diagnosis     ICD-10-CM    1. Vertigo  R42                      Subjective: Kylee states that the dizziness has been very good. She has been trying to sleep more on her other side until she can't. The last time she had dizziness was at least over a week ago.       Objective: See treatment diary below    L [place]-hallpike: negative for symptoms and nystagmus  R [place]-hallpike: negative for symptoms and nystagmus  L roll test: negative for symptoms and nystagmus  R roll test: negative for symptoms and nystagmus      Assessment: Kylee has responded favorably to canalith repositioning with no subjective symptoms for the past week. All BPPV positional testing was negative for all canals bilaterally for both symptoms and nystagmus. Her DHI score significantly improved from 30/100 on IE to 0/100 today. She has met all goals for PT with resolution of symptoms. Due to high recurrence rate of BPPV, she will be placed on a 30 day hold in case symptoms return. If they do, she is to call the clinic to get rescheduled. If no contact from her in that 30 days, she will be discharged at that time. She verbalized understanding and agreement with this plan with all her questions answered.    Goals  STGs in 4 weeks  1. Patient will be independent with HEP (for VOR, movement deficits, and/or balance as needed). - MET  2. Patient will be able to perform all cervical ROM without provocation of symptoms. - MET  3. Balance testing will be completed prn. - deferred     LTGs in 8 weeks  1. Patient will not have any symptom provocation during any BPPV testing. - MET  2. Patient will improve score on Dizziness Handicap Index by at least 17% for decreased perception of disability (17% is minimal detectable change). - MET  3. Patient will be able to complete all bed mobility and  household tasks without provocation of symptoms for improved QOL.  - MET         Plan: PT on hold for 30 days in case symptoms return     Short Term Goal Expiration Date:(9/30/2024)  Long Term Goal Expiration Date: (10/30/2024)  POC Expiration Date: (10/30/2024)      POC expires Unit limit Auth Expiration date PT/OT/ST + Visit Limit?   10/30/24  12/31/24 60 vpcy                             Visit/Unit Tracking  AUTH Status:  Date 8/30 9/6 9/23 10/7          60 visits pcy Used 1 IE 2 3 4 PN           Remaining  59 58 57 56                Precautions n/a       Manuals 8/30 9/6 9/23 10/7                    Pt education BPPV diagnosis, prognosis, post maneuver precautions   Negative BPPV testing--high recurrence rate, PT on hold for 30 days in case symptoms return            Neuro Re-Ed         CRM L epley x2 rds    Self epley for HEP L epley x2 rds L epley x3 rds             VOR x1 viewing  HEP      Patric wolf  HEP                              Ther Ex                                                                        Ther Activity                        Gait Training                        Modalities

## 2024-10-16 DIAGNOSIS — R23.2 HOT FLASHES: Primary | ICD-10-CM

## 2024-10-16 DIAGNOSIS — N89.8 VAGINAL DRYNESS: ICD-10-CM

## 2024-10-16 RX ORDER — PROGESTERONE 100 MG/1
100 CAPSULE ORAL
Qty: 30 CAPSULE | Refills: 3 | Status: SHIPPED | OUTPATIENT
Start: 2024-10-16

## 2024-10-16 RX ORDER — ESTRADIOL 0.03 MG/D
1 PATCH TRANSDERMAL WEEKLY
Qty: 4 PATCH | Refills: 3 | Status: SHIPPED | OUTPATIENT
Start: 2024-10-16

## 2024-10-16 RX ORDER — ESTRADIOL 0.1 MG/G
1 CREAM VAGINAL 2 TIMES WEEKLY
Qty: 42.5 G | Refills: 2 | Status: SHIPPED | OUTPATIENT
Start: 2024-10-17 | End: 2024-10-18

## 2024-10-17 DIAGNOSIS — N89.8 VAGINAL DRYNESS: ICD-10-CM

## 2024-10-18 RX ORDER — ESTRADIOL 0.1 MG/G
CREAM VAGINAL
Qty: 42.5 G | Refills: 2 | Status: SHIPPED | OUTPATIENT
Start: 2024-10-18

## 2024-10-29 DIAGNOSIS — F41.8 ANXIETY WITH DEPRESSION: ICD-10-CM

## 2024-11-01 ENCOUNTER — TELEMEDICINE (OUTPATIENT)
Dept: OTHER | Facility: HOSPITAL | Age: 54
End: 2024-11-01
Payer: COMMERCIAL

## 2024-11-01 DIAGNOSIS — J01.40 ACUTE NON-RECURRENT PANSINUSITIS: Primary | ICD-10-CM

## 2024-11-01 PROCEDURE — 99213 OFFICE O/P EST LOW 20 MIN: CPT | Performed by: PHYSICIAN ASSISTANT

## 2024-11-01 RX ORDER — CETIRIZINE HYDROCHLORIDE, PSEUDOEPHEDRINE HYDROCHLORIDE 5; 120 MG/1; MG/1
1 TABLET, FILM COATED, EXTENDED RELEASE ORAL 2 TIMES DAILY
Qty: 20 TABLET | Refills: 0 | Status: SHIPPED | OUTPATIENT
Start: 2024-11-01

## 2024-11-01 RX ORDER — FLUTICASONE PROPIONATE 50 MCG
1 SPRAY, SUSPENSION (ML) NASAL DAILY
Qty: 9.9 ML | Refills: 0 | Status: SHIPPED | OUTPATIENT
Start: 2024-11-01

## 2024-11-01 NOTE — PATIENT INSTRUCTIONS
"As discussed, sinus infections are typically viral and will get better on their own in 7-10 days. You should try symptomatic relief in the meantime with OTC antihistamine (Claritin or Zyrtec), Afrin for up to 3 days then switch to Flonase, and Sudafed (behind the counter) and mucinex. You can also try sinus rinses with a neti pot or nasal lavage (be sure to use distilled water.) Sleep with a cool mist humidifier at your bedside. If your symptoms do not improve after 7-10 days, you may need antibiotics because it is more likely to be bacterial at that point.  Follow-up with your primary care physician for recheck in 2-3 business days. Go to the ER for sudden severe headache, high fevers, change in vision, seizure activity or anything else that is concerning.    [facility] Phone number is [phone] if you need assistance or have further questions  note in \"Letters\" section of [facility] [email or web address]. Can print if opened from a web browser    "

## 2024-11-01 NOTE — PROGRESS NOTES
Virtual Regular Visit  Name: Kylee De Paz      : 1970      MRN: [id number]  Encounter Provider: Your Video Visit Provider  Encounter Date: 2024   Encounter department: VIRTUAL CARE     Verification of patient location:    Patient is located at Home in the following state in which I hold an active license [place]    Assessment & Plan  Acute non-recurrent pansinusitis    Orders:    cetirizine-pseudoephedrine (ZyrTEC-D) 5-120 MG per tablet; Take 1 tablet by mouth 2 (two) times a day    fluticasone (FLONASE) 50 mcg/act nasal spray; 1 spray into each nostril daily         Encounter provider Your Video Visit Provider    The patient was identified by name and date of birth. Kylee De Paz was informed that this is a telemedicine visit and that the visit is being conducted through the Epic Embedded platform. She agrees to proceed..  My office door was closed. No one else was in the room.  She acknowledged consent and understanding of privacy and security of the video platform. The patient has agreed to participate and understands they can discontinue the visit at any time.    Patient is aware this is a billable service.     History of Present Illness     Pt reports cold sx, yellow mucous, nasal congestion, pansinus pressure, PND, HA starting 2 days ago. Took nyquil last night and dayquil, ibuprofen with some relief. Denies fevers, CP, SOB.         History obtained from : patient  Review of Systems   Constitutional:  Positive for fatigue. Negative for fever.   HENT:  Positive for congestion, postnasal drip, sinus pressure and sinus pain. Negative for ear pain, nosebleeds and sore throat.    Eyes:  Negative for redness.   Respiratory:  Negative for shortness of breath.    Cardiovascular:  Negative for chest pain.   Gastrointestinal:  Negative for blood in stool.   Genitourinary:  Negative for hematuria.   Musculoskeletal:  Negative for gait problem.   Skin:  Negative for rash.   Neurological:  Negative for  seizures.   Psychiatric/Behavioral:  Negative for behavioral problems.      Medical History Reviewed by provider this encounter:  Allergies  Meds  Problems           Objective     There were no vitals taken for this visit.  Physical Exam  Constitutional:       General: She is not in acute distress.     Appearance: Normal appearance. She is not toxic-appearing.   HENT:      Head: Normocephalic and atraumatic.      Nose: No rhinorrhea.      Comments: Sounds congested     Mouth/Throat:      Mouth: Mucous membranes are moist.   Eyes:      Conjunctiva/sclera: Conjunctivae normal.      Comments: glasses   Pulmonary:      Effort: Pulmonary effort is normal. No respiratory distress.      Breath sounds: No wheezing (no gross audible wheeze through computer).   Musculoskeletal:      Cervical back: Normal range of motion.   Skin:     Findings: No rash (on face or neck).   Neurological:      Mental Status: She is alert.      Cranial Nerves: No dysarthria or facial asymmetry.   Psychiatric:         Mood and Affect: Mood normal.         Behavior: Behavior normal.         Visit Time  Total Visit Duration: 10 min

## 2024-11-02 ENCOUNTER — OFFICE VISIT (OUTPATIENT)
Dept: URGENT CARE | Facility: CLINIC | Age: 54
End: 2024-11-02
Payer: COMMERCIAL

## 2024-11-02 VITALS
WEIGHT: 170 LBS | OXYGEN SATURATION: 99 % | SYSTOLIC BLOOD PRESSURE: 120 MMHG | BODY MASS INDEX: 29.02 KG/M2 | RESPIRATION RATE: 20 BRPM | HEART RATE: 70 BPM | HEIGHT: 64 IN | DIASTOLIC BLOOD PRESSURE: 60 MMHG | TEMPERATURE: 98.6 F

## 2024-11-02 DIAGNOSIS — J06.9 UPPER RESPIRATORY TRACT INFECTION, UNSPECIFIED TYPE: ICD-10-CM

## 2024-11-02 DIAGNOSIS — H10.33 ACUTE CONJUNCTIVITIS OF BOTH EYES, UNSPECIFIED ACUTE CONJUNCTIVITIS TYPE: Primary | ICD-10-CM

## 2024-11-02 PROCEDURE — S9083 URGENT CARE CENTER GLOBAL: HCPCS | Performed by: NURSE PRACTITIONER

## 2024-11-02 PROCEDURE — G0382 LEV 3 HOSP TYPE B ED VISIT: HCPCS | Performed by: NURSE PRACTITIONER

## 2024-11-02 RX ORDER — TOBRAMYCIN 3 MG/ML
1 SOLUTION/ DROPS OPHTHALMIC
Qty: 10 ML | Refills: 0 | Status: SHIPPED | OUTPATIENT
Start: 2024-11-02

## 2024-11-02 NOTE — PATIENT INSTRUCTIONS
"Change your pillow case tonight and tomorrow night.  Use a clean cloth each time you wipe your eye.  Practice careful handwashing, especially for the first 24 hours of treatment.  For the first 24 hours, it is alright to use the drops every 2 hours while awake; after that resume the every 4 hours while awake frequency.  Viruses are worst for the first 3-6 days, mostly better by days 7-10 and all the way better by days 7-14.  If you are not feeling improvement in a few days, go ahead and  the Augmentin--I have timed that to be available Tuesday or after.  If symptoms significantly change/worsen, then you should be seen again.  Patient Education     Conjunctivitis (pink eye)   The Basics   Written by the doctors and editors at [facility]   What is pink eye? -- Pink eye is a term people use to describe an infection or irritation of the eye. The medical term for pink eye is \"conjunctivitis.\"  If you have pink eye, your eye (or eyes) might:   Turn pink or red   Weep or ooze a gooey liquid   Become itchy or burn   Get stuck shut, especially when you first wake up  Pink eye can be caused by an infection, allergies, or an unknown irritation.  Can you catch pink eye from someone else? -- Yes. When pink eye is caused by an infection, it can spread easily. Usually, people catch it from touching something that has been in contact with an infected person's eye. It can also be spread when an infected person touches someone else, and then that person touches their eye.  If someone you know has pink eye, avoid touching their pillowcases, towels, or other personal items.  When should I see a doctor or nurse? -- See your doctor or nurse if your eye hurts, or if you still have trouble seeing clearly after blinking. If you do not have these problems, but think you might have pink eye, your doctor or nurse might be able to give you advice over the phone.  Can pink eye be treated? -- Most cases of pink eye go away on their own " "without treatment. But some types of pink eye can be treated.  When pink eye is caused by infection, it is usually caused by a virus, so antibiotics will not help. Still, pink eye caused by a virus can last several days.   Pink eye caused by an infection with bacteria can be treated with antibiotic eye drops, gel, or ointment.   Pink eye caused by other problems can be treated with eye drops normally used to treat allergies. These drops will not cure the pink eye, but they can help with itchiness and irritation.  When using eye drops for infection, do not touch your healthy eye after touching your infected eye. Also, do not touch the bottle or dropper directly onto 1 eye and then use it in the other. These things can cause the infection to spread from 1 eye to the other.  If your eyelids feel swollen, it might also help to hold a cool wet cloth on the area.  What if I wear contact lenses? -- If you wear contact lenses and you have symptoms of pink eye, it is really important to have a doctor look at your eyes. In people who wear contacts, the symptoms of pink eye can be caused by \"corneal abrasion.\" Corneal abrasion is a scratch on the eye and can be a serious problem.  During treatment for eye infections, you might need to stop wearing your contacts for a short time. If your contacts are disposable, throw them away and use new ones. If your contacts are not disposable, you need to carefully clean them. You should also throw away your contact lens case and get a new one.  When can I go back to work or school? -- If you have pink eye caused by an infection, remember that it can spread very easily. The best way to avoid spreading it is to stay away from other people until you no longer have symptoms. If this is not possible, wash your hands often (figure 1). It's also important to avoid touching your eyes and sharing items that could spread the infection.  Schools and day cares usually have rules about when a child " with pink eye can return. If a child has a bacterial infection, they will probably need to stay home until they have gotten antibiotic eye drops or ointment for 24 hours.  Can pink eye be prevented? -- To keep from getting or spreading pink eye caused by an infection:   Wash your hands often with soap and water.   Try not to touch your eyes.   Avoid sharing towels, bedding, or other personal items with a person who has pink eye.  If your pink eye is caused by allergies, it might help to stay inside with the windows shut as much as possible during peak allergy seasons.  What problems should I watch for? -- Call your doctor or nurse if:   You have trouble seeing clearly after blinking.   Your eye is still red or has drainage after 3 days.   You have eye pain that is getting worse.  All topics are updated as new evidence becomes available and our peer review process is complete.  This topic retrieved from [employer] on: Feb 28, 2024.  Topic 50100 Version 20.0  Release: 32.2.4 - C32.58  © 2024 UpToDate, Inc. and/or its affiliates. All rights reserved.  figure 1: How to wash your hands     Wet your hands with clean water, and apply a small amount of soap. Lather and rub hands together for at least 20 seconds. Clean your wrists, palms, backs of your hands, between your fingers, tips of your fingers, thumbs, and under and around your nails. Rinse well, and dry your hands using a clean towel.  Graphic 565226 Version 7.0  Consumer Information Use and Disclaimer   Disclaimer: This generalized information is a limited summary of diagnosis, treatment, and/or medication information. It is not meant to be comprehensive and should be used as a tool to help the user understand and/or assess potential diagnostic and treatment options. It does NOT include all information about conditions, treatments, medications, side effects, or risks that may apply to a specific patient. It is not intended to be medical advice or a substitute for the  "medical advice, diagnosis, or treatment of a health care provider based on the health care provider's examination and assessment of a patient's specific and unique circumstances. Patients must speak with a health care provider for complete information about their health, medical questions, and treatment options, including any risks or benefits regarding use of medications. This information does not endorse any treatments or medications as safe, effective, or approved for treating a specific patient. UpToDate, Inc. and its affiliates disclaim any warranty or liability relating to this information or the use thereof.The use of this information is governed by the Terms of Use, available at https://www.[employer].com/en/know/clinical-effectiveness-terms. 2024© UpToDate, Inc. and its affiliates and/or licensors. All rights reserved.  Copyright   © 2024 UpToDate, Inc. and/or its affiliates. All rights reserved.    Patient Education     Cough, runny nose, and the common cold   The Basics   Written by the doctors and editors at [facility]   What causes cough, runny nose, and other symptoms of the common cold? -- These symptoms are usually caused by a virus. Doctors also use the term \"viral upper respiratory infection\" or \"viral URI.\" Lots of different viruses can get into your nose, mouth, throat, or airways and cause cold symptoms.  Most people get better from a cold without any lasting problems. Even so, having a cold can be uncomfortable.  What are the symptoms of the common cold? -- Symptoms can include:   Sneezing   Coughing   Sniffling and runny nose   Sore throat   Chest congestion  In children, the common cold can also cause a fever. But adults do not usually get a fever when they have a cold.  Colds usually last about 3 to 7 days in adults and 10 days in children. But some people have symptoms for up to 2 weeks.  How can I tell if I have a cold or something else? -- Sometimes, it can be hard to tell if you have a " cold or something else. Some cold symptoms can also be caused by other illnesses, such as COVID-19, the flu, or strep throat.  There are sometimes clues that can help you tell the difference:   COVID-19 often starts out very similar to a cold, although it can also cause a fever. If you have cold symptoms and have been around someone with COVID-19, you should get a test to find out if you have it, too.   The flu is more likely to cause fever, body aches, and extreme tiredness than a cold.   Strep throat usually causes severe throat pain. It can also cause a fever and swollen glands in the neck. People with strep throat usually do not have other cold symptoms like a stuffy nose or cough.  If you think that you might have an illness other than the common cold, call your doctor or nurse. They can tell you what to do.  Can medicine help with a cold? -- Usually, a cold gets better on its own and does not need treatment. Because colds are usually caused by viruses, antibiotics will not help.  If you are a teen or an adult, you can try cough and cold medicines that you can get without a prescription. These medicines might help with your symptoms. But they can't cure your cold, or help you get well faster.  If you decide to try non-prescription cold medicines:   Read the directions on the label, and follow them carefully.   Do not combine 2 or more medicines that have acetaminophen in them. If you take too much acetaminophen, it can damage your liver.   If you have a heart condition, have high blood pressure, or take any prescription medicines, talk to your doctor or pharmacist before taking cold medicine. They can tell you which medicines are safe.  Some medicines are not safe for children:   If your child is younger than 6, do not give them any cold medicines. These medicines are not safe for young children. Even if your child is older than 6, cough and cold medicines are unlikely to help.   Never give aspirin to any child  younger than 18 years old. In children, aspirin can cause a life-threatening condition called Reye syndrome.   When giving your child acetaminophen or other non-prescription medicines, never give more than the recommended dose.  Is there anything I can do on my own to feel better? -- Yes. You can:   Get plenty of rest.   Drink lots of fluids (water, juice, or broth) to stay hydrated. This will help replace any fluids lost if you have a runny nose or sweating from a fever. Warm tea or soup can help soothe a sore throat.   If the air in your home feels dry, use a cool-mist humidifier. This can help a stuffy nose and make it easier to breathe.   Use saline nose drops or spray to relieve stuffiness.   Avoid smoking, and stay away from places where people are smoking.  Can the common cold lead to more serious problems? -- In some cases, yes. In some people, having a cold can lead to:   Ear infections   Worse asthma symptoms   Sinus infections   Pneumonia or bronchitis (infections of the lungs)  Can colds be prevented? -- There are some things you can do to keep germs from spreading:   Wash your hands with soap and water often (figure 1) - This can also help prevent the spread of other illnesses like the flu and COVID-19.   Cover your cough - Cough into your elbow instead of your hands. Teach children to do this, too. Throw away used tissues right away.   Clean surfaces - The germs that cause the common cold can live on tables, door handles, and other surfaces for at least 2 hours.   Stay home if you are sick - When you do need to be around other people, consider wearing a face mask until you are feeling better.  When should I call the doctor? -- Contact your doctor or nurse if you:   Lose your sense of taste or smell   Have a fever of more than 100.4°F (38°C) that comes with shaking chills, loss of appetite, or trouble breathing   Have a very bad sore throat   Have a fever and also have lung disease, such as emphysema or  asthma   Have a cough that lasts longer than 10 days or starts getting worse   Have chest pain when you cough or breathe deeply, have trouble breathing, or cough up blood  If you are older than 65, or if you have any chronic medical conditions such as diabetes, contact your doctor or nurse any time you get a long-lasting cough.  Take your child to the emergency department if they:   Become confused or stop responding to you   Have trouble breathing or have to work hard to breathe  Contact your child's doctor or nurse if the child:   Loses their sense of taste or smell or won't eat foods that they ate before   Has a very bad sore throat   Refuses to drink anything for a long time   Is younger than 4 months   Has a fever and is not acting like themselves   Has a cough that lasts for more than 2 weeks and is not getting any better or is getting worse   Has a stuffed or runny nose that gets worse or does not get any better after 10 days   Has red eyes or yellow goop coming out of their eyes   Has ear pain, pulls at their ears, or shows other signs of having an ear infection  All topics are updated as new evidence becomes available and our peer review process is complete.  This topic retrieved from [email or web address] on: Feb 26, 2024.  Topic 63285 Version 30.0  Release: 32.2.4 - C32.56  © 2024 UpToDate, Inc. and/or its affiliates. All rights reserved.  figure 1: How to wash your hands     Wet your hands with clean water, and apply a small amount of soap. Lather and rub hands together for at least 20 seconds. Clean your wrists, palms, backs of your hands, between your fingers, tips of your fingers, thumbs, and under and around your nails. Rinse well, and dry your hands using a clean towel.  Graphic 945620 Version 7.0  Consumer Information Use and Disclaimer   Disclaimer: This generalized information is a limited summary of diagnosis, treatment, and/or medication information. It is not meant to be comprehensive and should be used as  a tool to help the user understand and/or assess potential diagnostic and treatment options. It does NOT include all information about conditions, treatments, medications, side effects, or risks that may apply to a specific patient. It is not intended to be medical advice or a substitute for the medical advice, diagnosis, or treatment of a health care provider based on the health care provider's examination and assessment of a patient's specific and unique circumstances. Patients must speak with a health care provider for complete information about their health, medical questions, and treatment options, including any risks or benefits regarding use of medications. This information does not endorse any treatments or medications as safe, effective, or approved for treating a specific patient. UpToDate, Inc. and its affiliates disclaim any warranty or liability relating to this information or the use thereof.The use of this information is governed by the Terms of Use, available at https://www.[employer].com/en/know/clinical-effectiveness-terms. 2024© UpToDate, Inc. and its affiliates and/or licensors. All rights reserved.  Copyright   © 2024 UpToDate, Inc. and/or its affiliates. All rights reserved.

## 2024-11-03 NOTE — PROGRESS NOTES
"  [facility]        NAME: Kylee De Paz is a 54 y.o. female  : 1970    MRN: [id number]  DATE: 2024  TIME: 8:12 PM      Assessment and Plan     Acute conjunctivitis of both eyes, unspecified acute conjunctivitis type [H10.33]  1. Acute conjunctivitis of both eyes, unspecified acute conjunctivitis type  tobramycin (TOBREX) 0.3 % SOLN      2. Upper respiratory tract infection, unspecified type  amoxicillin-clavulanate (AUGMENTIN) 875-125 mg per tablet            Patient Instructions     Patient Instructions   Change your pillow case tonight and tomorrow night.  Use a clean cloth each time you wipe your eye.  Practice careful handwashing, especially for the first 24 hours of treatment.  For the first 24 hours, it is alright to use the drops every 2 hours while awake; after that resume the every 4 hours while awake frequency.  Viruses are worst for the first 3-6 days, mostly better by days 7-10 and all the way better by days 7-14.  If you are not feeling improvement in a few days, go ahead and  the Augmentin--I have timed that to be available Tuesday or after.  If symptoms significantly change/worsen, then you should be seen again.  Patient Education     Conjunctivitis (pink eye)   The Basics   Written by the doctors and editors at [facility]   What is pink eye? -- Pink eye is a term people use to describe an infection or irritation of the eye. The medical term for pink eye is \"conjunctivitis.\"  If you have pink eye, your eye (or eyes) might:   Turn pink or red   Weep or ooze a gooey liquid   Become itchy or burn   Get stuck shut, especially when you first wake up  Pink eye can be caused by an infection, allergies, or an unknown irritation.  Can you catch pink eye from someone else? -- Yes. When pink eye is caused by an infection, it can spread easily. Usually, people catch it from touching something that has been in contact with an infected person's eye. It can also be spread when an " "infected person touches someone else, and then that person touches their eye.  If someone you know has pink eye, avoid touching their pillowcases, towels, or other personal items.  When should I see a doctor or nurse? -- See your doctor or nurse if your eye hurts, or if you still have trouble seeing clearly after blinking. If you do not have these problems, but think you might have pink eye, your doctor or nurse might be able to give you advice over the phone.  Can pink eye be treated? -- Most cases of pink eye go away on their own without treatment. But some types of pink eye can be treated.  When pink eye is caused by infection, it is usually caused by a virus, so antibiotics will not help. Still, pink eye caused by a virus can last several days.   Pink eye caused by an infection with bacteria can be treated with antibiotic eye drops, gel, or ointment.   Pink eye caused by other problems can be treated with eye drops normally used to treat allergies. These drops will not cure the pink eye, but they can help with itchiness and irritation.  When using eye drops for infection, do not touch your healthy eye after touching your infected eye. Also, do not touch the bottle or dropper directly onto 1 eye and then use it in the other. These things can cause the infection to spread from 1 eye to the other.  If your eyelids feel swollen, it might also help to hold a cool wet cloth on the area.  What if I wear contact lenses? -- If you wear contact lenses and you have symptoms of pink eye, it is really important to have a doctor look at your eyes. In people who wear contacts, the symptoms of pink eye can be caused by \"corneal abrasion.\" Corneal abrasion is a scratch on the eye and can be a serious problem.  During treatment for eye infections, you might need to stop wearing your contacts for a short time. If your contacts are disposable, throw them away and use new ones. If your contacts are not disposable, you need to " carefully clean them. You should also throw away your contact lens case and get a new one.  When can I go back to work or school? -- If you have pink eye caused by an infection, remember that it can spread very easily. The best way to avoid spreading it is to stay away from other people until you no longer have symptoms. If this is not possible, wash your hands often (figure 1). It's also important to avoid touching your eyes and sharing items that could spread the infection.  Schools and day cares usually have rules about when a child with pink eye can return. If a child has a bacterial infection, they will probably need to stay home until they have gotten antibiotic eye drops or ointment for 24 hours.  Can pink eye be prevented? -- To keep from getting or spreading pink eye caused by an infection:   Wash your hands often with soap and water.   Try not to touch your eyes.   Avoid sharing towels, bedding, or other personal items with a person who has pink eye.  If your pink eye is caused by allergies, it might help to stay inside with the windows shut as much as possible during peak allergy seasons.  What problems should I watch for? -- Call your doctor or nurse if:   You have trouble seeing clearly after blinking.   Your eye is still red or has drainage after 3 days.   You have eye pain that is getting worse.  All topics are updated as new evidence becomes available and our peer review process is complete.  This topic retrieved from [employer] on: Feb 28, 2024.  Topic 50695 Version 20.0  Release: 32.2.4 - C32.58  © 2024 UpToDate, Inc. and/or its affiliates. All rights reserved.  figure 1: How to wash your hands     Wet your hands with clean water, and apply a small amount of soap. Lather and rub hands together for at least 20 seconds. Clean your wrists, palms, backs of your hands, between your fingers, tips of your fingers, thumbs, and under and around your nails. Rinse well, and dry your hands using a clean  "anna.  Graphic 592642 Version 7.0  Consumer Information Use and Disclaimer   Disclaimer: This generalized information is a limited summary of diagnosis, treatment, and/or medication information. It is not meant to be comprehensive and should be used as a tool to help the user understand and/or assess potential diagnostic and treatment options. It does NOT include all information about conditions, treatments, medications, side effects, or risks that may apply to a specific patient. It is not intended to be medical advice or a substitute for the medical advice, diagnosis, or treatment of a health care provider based on the health care provider's examination and assessment of a patient's specific and unique circumstances. Patients must speak with a health care provider for complete information about their health, medical questions, and treatment options, including any risks or benefits regarding use of medications. This information does not endorse any treatments or medications as safe, effective, or approved for treating a specific patient. UpToDate, Inc. and its affiliates disclaim any warranty or liability relating to this information or the use thereof.The use of this information is governed by the Terms of Use, available at https://www.[employer].com/en/know/clinical-effectiveness-terms. 2024© UpToDate, Inc. and its affiliates and/or licensors. All rights reserved.  Copyright   © 2024 UpToDate, Inc. and/or its affiliates. All rights reserved.    Patient Education     Cough, runny nose, and the common cold   The Basics   Written by the doctors and editors at [facility]   What causes cough, runny nose, and other symptoms of the common cold? -- These symptoms are usually caused by a virus. Doctors also use the term \"viral upper respiratory infection\" or \"viral URI.\" Lots of different viruses can get into your nose, mouth, throat, or airways and cause cold symptoms.  Most people get better from a cold without any " lasting problems. Even so, having a cold can be uncomfortable.  What are the symptoms of the common cold? -- Symptoms can include:   Sneezing   Coughing   Sniffling and runny nose   Sore throat   Chest congestion  In children, the common cold can also cause a fever. But adults do not usually get a fever when they have a cold.  Colds usually last about 3 to 7 days in adults and 10 days in children. But some people have symptoms for up to 2 weeks.  How can I tell if I have a cold or something else? -- Sometimes, it can be hard to tell if you have a cold or something else. Some cold symptoms can also be caused by other illnesses, such as COVID-19, the flu, or strep throat.  There are sometimes clues that can help you tell the difference:   COVID-19 often starts out very similar to a cold, although it can also cause a fever. If you have cold symptoms and have been around someone with COVID-19, you should get a test to find out if you have it, too.   The flu is more likely to cause fever, body aches, and extreme tiredness than a cold.   Strep throat usually causes severe throat pain. It can also cause a fever and swollen glands in the neck. People with strep throat usually do not have other cold symptoms like a stuffy nose or cough.  If you think that you might have an illness other than the common cold, call your doctor or nurse. They can tell you what to do.  Can medicine help with a cold? -- Usually, a cold gets better on its own and does not need treatment. Because colds are usually caused by viruses, antibiotics will not help.  If you are a teen or an adult, you can try cough and cold medicines that you can get without a prescription. These medicines might help with your symptoms. But they can't cure your cold, or help you get well faster.  If you decide to try non-prescription cold medicines:   Read the directions on the label, and follow them carefully.   Do not combine 2 or more medicines that have acetaminophen  in them. If you take too much acetaminophen, it can damage your liver.   If you have a heart condition, have high blood pressure, or take any prescription medicines, talk to your doctor or pharmacist before taking cold medicine. They can tell you which medicines are safe.  Some medicines are not safe for children:   If your child is younger than 6, do not give them any cold medicines. These medicines are not safe for young children. Even if your child is older than 6, cough and cold medicines are unlikely to help.   Never give aspirin to any child younger than 18 years old. In children, aspirin can cause a life-threatening condition called Reye syndrome.   When giving your child acetaminophen or other non-prescription medicines, never give more than the recommended dose.  Is there anything I can do on my own to feel better? -- Yes. You can:   Get plenty of rest.   Drink lots of fluids (water, juice, or broth) to stay hydrated. This will help replace any fluids lost if you have a runny nose or sweating from a fever. Warm tea or soup can help soothe a sore throat.   If the air in your home feels dry, use a cool-mist humidifier. This can help a stuffy nose and make it easier to breathe.   Use saline nose drops or spray to relieve stuffiness.   Avoid smoking, and stay away from places where people are smoking.  Can the common cold lead to more serious problems? -- In some cases, yes. In some people, having a cold can lead to:   Ear infections   Worse asthma symptoms   Sinus infections   Pneumonia or bronchitis (infections of the lungs)  Can colds be prevented? -- There are some things you can do to keep germs from spreading:   Wash your hands with soap and water often (figure 1) - This can also help prevent the spread of other illnesses like the flu and COVID-19.   Cover your cough - Cough into your elbow instead of your hands. Teach children to do this, too. Throw away used tissues right away.   Clean surfaces - The  germs that cause the common cold can live on tables, door handles, and other surfaces for at least 2 hours.   Stay home if you are sick - When you do need to be around other people, consider wearing a face mask until you are feeling better.  When should I call the doctor? -- Contact your doctor or nurse if you:   Lose your sense of taste or smell   Have a fever of more than 100.4°F (38°C) that comes with shaking chills, loss of appetite, or trouble breathing   Have a very bad sore throat   Have a fever and also have lung disease, such as emphysema or asthma   Have a cough that lasts longer than 10 days or starts getting worse   Have chest pain when you cough or breathe deeply, have trouble breathing, or cough up blood  If you are older than 65, or if you have any chronic medical conditions such as diabetes, contact your doctor or nurse any time you get a long-lasting cough.  Take your child to the emergency department if they:   Become confused or stop responding to you   Have trouble breathing or have to work hard to breathe  Contact your child's doctor or nurse if the child:   Loses their sense of taste or smell or won't eat foods that they ate before   Has a very bad sore throat   Refuses to drink anything for a long time   Is younger than 4 months   Has a fever and is not acting like themselves   Has a cough that lasts for more than 2 weeks and is not getting any better or is getting worse   Has a stuffed or runny nose that gets worse or does not get any better after 10 days   Has red eyes or yellow goop coming out of their eyes   Has ear pain, pulls at their ears, or shows other signs of having an ear infection  All topics are updated as new evidence becomes available and our peer review process is complete.  This topic retrieved from [facility] on: Feb 26, 2024.  Topic 62033 Version 30.0  Release: 32.2.4 - C32.56  © 2024 UpToDate, Inc. and/or its affiliates. All rights reserved.  figure 1: How to wash your  hands     Wet your hands with clean water, and apply a small amount of soap. Lather and rub hands together for at least 20 seconds. Clean your wrists, palms, backs of your hands, between your fingers, tips of your fingers, thumbs, and under and around your nails. Rinse well, and dry your hands using a clean towel.  Graphic 382178 Version 7.0  Consumer Information Use and Disclaimer   Disclaimer: This generalized information is a limited summary of diagnosis, treatment, and/or medication information. It is not meant to be comprehensive and should be used as a tool to help the user understand and/or assess potential diagnostic and treatment options. It does NOT include all information about conditions, treatments, medications, side effects, or risks that may apply to a specific patient. It is not intended to be medical advice or a substitute for the medical advice, diagnosis, or treatment of a health care provider based on the health care provider's examination and assessment of a patient's specific and unique circumstances. Patients must speak with a health care provider for complete information about their health, medical questions, and treatment options, including any risks or benefits regarding use of medications. This information does not endorse any treatments or medications as safe, effective, or approved for treating a specific patient. UpToDate, Inc. and its affiliates disclaim any warranty or liability relating to this information or the use thereof.The use of this information is governed by the Terms of Use, available at https://www.woltersCakeStyleuwer.com/en/know/clinical-effectiveness-terms. 2024© UpToDate, Inc. and its affiliates and/or licensors. All rights reserved.  Copyright   © 2024 UpToDate, Inc. and/or its affiliates. All rights reserved.      Follow up with PCP in 3-5 days.  Proceed to  ER if symptoms worsen.    Chief Complaint     Chief Complaint   Patient presents with    Eye Problem     Bilateral  eye irritation and drainage since yesterday. Has been dealing with sinus issues, no abx.          History of Present Illness     Patient had onset of sinus symptoms couple days ago.  She had a virtual visit yesterday and was prescribed Zyrtec with Sudafed.  She has not started this yet; will be picking it up today.  She has started having irritation of both eyes with some drainage.  She expresses concern about conjunctivitis.  We discussed that conjunctivitis can be viral, bacterial (or actually allergy based).  We discussed that sinus pressure can also cause mucus to drain through the eyes.    Eye Problem   Associated symptoms include an eye discharge, eye redness and itching.       Review of Systems     Review of Systems   HENT:  Positive for congestion, sinus pressure and sinus pain.    Eyes:  Positive for discharge, redness and itching.   All other systems reviewed and are negative.        Current Medications       Current Outpatient Medications:     albuterol (Ventolin HFA) 90 mcg/act inhaler, Inhale 2 puffs every 6 (six) hours as needed for wheezing, Disp: 18 g, Rfl: 5    [START ON 11/5/2024] amoxicillin-clavulanate (AUGMENTIN) 875-125 mg per tablet, Take 1 tablet by mouth every 12 (twelve) hours for 7 days Do not start before November 5, 2024., Disp: 14 tablet, Rfl: 0    cetirizine-pseudoephedrine (ZyrTEC-D) 5-120 MG per tablet, Take 1 tablet by mouth 2 (two) times a day, Disp: 20 tablet, Rfl: 0    estradiol (Climara) 0.025 mg/24 hr, Place 1 patch on the skin over 7 days once a week, Disp: 4 patch, Rfl: 3    fluticasone (FLONASE) 50 mcg/act nasal spray, 1 spray into each nostril daily, Disp: 9.9 mL, Rfl: 0    hydrocortisone 2.5 % ointment, APPLY A THIN LAYER TO RASH ON FACE 2 TIMES A DAY FOR 1 WEEK  THEN...  (REFER TO PRESCRIPTION NOTES)., Disp: , Rfl:     ibuprofen (MOTRIN) 200 mg tablet, Take 400 mg by mouth every 6 (six) hours as needed for mild pain, Disp: , Rfl:     meclizine (ANTIVERT) 12.5 MG tablet,  Take 2 tablets (25 mg total) by mouth every 12 (twelve) hours as needed for dizziness, Disp: 30 tablet, Rfl: 0    ondansetron (ZOFRAN) 4 mg tablet, Take 1 tablet (4 mg total) by mouth every 8 (eight) hours as needed for nausea or vomiting, Disp: 20 tablet, Rfl: 0    Progesterone 100 MG CAPS, Take 100 mg by mouth daily at bedtime, Disp: 30 capsule, Rfl: 3    sertraline (Zoloft) 50 mg tablet, Take 1 tablet (50 mg total) by mouth daily, Disp: 90 tablet, Rfl: 1    tobramycin (TOBREX) 0.3 % SOLN, Administer 1 drop to both eyes every 4 (four) hours while awake, Disp: 10 mL, Rfl: 0    cyclobenzaprine (FLEXERIL) 5 mg tablet, Take 1 tablet (5 mg total) by mouth 3 (three) times a day as needed for muscle spasms for up to 7 days, Disp: 20 tablet, Rfl: 0    Current Allergies     Allergies as of 11/02/2024 - Reviewed 11/02/2024   Allergen Reaction Noted    Bactrim [sulfamethoxazole-trimethoprim] Hives 08/17/2018              The following portions of the patient's history were reviewed and updated as appropriate: allergies, current medications, past family history, past medical history, past social history, past surgical history and problem list.     Past Medical History:   Diagnosis Date    Abnormal Pap smear of cervix     Allergic 2019    Supha drugs    Anxiety     Carpal tunnel syndrome     right wrist    Chronic back pain     Depression     Genital warts     Herpes     Menopause ovarian failure     Obesity (BMI 30.0-34.9) 11/03/2020    Urinary incontinence     Vulvar ulcer        Past Surgical History:   Procedure Laterality Date    CARPAL TUNNEL RELEASE Left 10/2021    CONDYLOMA EXCISION/FULGURATION      NO PAST SURGERIES         Family History   Problem Relation Age of Onset    Hypertension Mother     Vision loss Mother         UNSURE WHY    Macular degeneration Mother     Atrial fibrillation Mother         Atrial stenosis    Alcohol abuse Father     Mental illness Father     Schizophrenia Father     Breast cancer Maternal  "Aunt          Medications have been verified.        Objective     /60   Pulse 70   Temp 98.6 °F (37 °C)   Resp 20   Ht 5' 4\" (1.626 m)   Wt 77.1 kg (170 lb)   SpO2 99%   BMI 29.18 kg/m²   No LMP recorded.         Physical Exam     Physical Exam  Vitals and nursing note reviewed.   Constitutional:       General: She is not in acute distress.     Appearance: Normal appearance. She is well-developed. She is ill-appearing. She is not toxic-appearing or diaphoretic.   HENT:      Head: Normocephalic and atraumatic.      Right Ear: Hearing, tympanic membrane, ear canal and external ear normal.      Left Ear: Hearing, tympanic membrane, ear canal and external ear normal.      Nose: Mucosal edema and congestion present.      Right Sinus: Maxillary sinus tenderness and frontal sinus tenderness present.      Left Sinus: Maxillary sinus tenderness and frontal sinus tenderness present.      Mouth/Throat:      Pharynx: Uvula midline. No posterior oropharyngeal erythema.      Tonsils: No tonsillar exudate or tonsillar abscesses.   Eyes:      Conjunctiva/sclera:      Right eye: Right conjunctiva is injected (Slight). Exudate present.      Left eye: Left conjunctiva is injected (Slight). Exudate present.      Pupils: Pupils are equal, round, and reactive to light.   Cardiovascular:      Rate and Rhythm: Normal rate and regular rhythm.      Heart sounds: Normal heart sounds. No murmur heard.     No friction rub. No gallop.   Pulmonary:      Effort: Pulmonary effort is normal. No tachypnea, bradypnea, accessory muscle usage, prolonged expiration, respiratory distress or retractions.      Breath sounds: Normal breath sounds and air entry. No stridor or decreased air movement. No decreased breath sounds, wheezing, rhonchi or rales.   Abdominal:      General: Bowel sounds are normal. There is no distension.      Palpations: Abdomen is soft.      Tenderness: There is no abdominal tenderness.   Musculoskeletal:         " General: Normal range of motion.      Cervical back: Normal range of motion and neck supple.   Skin:     General: Skin is warm and dry.      Capillary Refill: Capillary refill takes less than 2 seconds.   Neurological:      General: No focal deficit present.      Mental Status: She is alert and oriented to person, place, and time.   Psychiatric:         Mood and Affect: Mood and affect normal.         Behavior: Behavior normal. Behavior is cooperative.         Thought Content: Thought content normal.         Judgment: Judgment normal.

## 2024-11-04 ENCOUNTER — TELEPHONE (OUTPATIENT)
Age: 54
End: 2024-11-04

## 2024-11-04 NOTE — TELEPHONE ENCOUNTER
Patient was seen twice last week for a sinus infection. An antibiotic was prescribed for her but it is not available until tomorrow. She is now having right ear pain and her symptoms are worsening. She would like to know if there is any way she can have the antibiotic for today - she is unable to take more time off from work to be seen again. Please call and advise.

## 2024-11-05 NOTE — TELEPHONE ENCOUNTER
Patient was started on Augmentin 11-2-24.  Advise she continue this prescription to completion and if her symptoms are worsening she needs to be seen in office

## 2024-11-11 ENCOUNTER — EVALUATION (OUTPATIENT)
Dept: PHYSICAL THERAPY | Facility: CLINIC | Age: 54
End: 2024-11-11
Payer: COMMERCIAL

## 2024-11-11 DIAGNOSIS — R42 VERTIGO: Primary | ICD-10-CM

## 2024-11-11 PROCEDURE — 97164 PT RE-EVAL EST PLAN CARE: CPT

## 2024-11-11 PROCEDURE — 97112 NEUROMUSCULAR REEDUCATION: CPT

## 2024-11-11 NOTE — PROGRESS NOTES
PT Re-evaluation Note     Today's date: 2024  Patient name: Kylee De Paz  : 1970  MRN: [id number]  Referring provider: Temo Silver MD  Dx:   Encounter Diagnosis     ICD-10-CM    1. Vertigo  R42                      Subjective: Kylee states that she started feeling room spinning dizziness again about 3 weeks ago. She describes it as a moderate intensity. She last felt it yesterday while in bed trying to fall asleep. She will feel it when she lays down, gets up, moves in bed, sometimes while exercising. It remains always on the L side. It lasts about 10-15 seconds when triggered. She denies it stopping her from doing anything, just more cautious while exercising.    Patient goals:  not be dizzy    Pain:  No pain reported      Objective: See treatment diary below    Dysequilibrium: Yes  Lightheadedness: No  Vertigo: Yes  Rocking or Swaying: No         Oscillopsia: No  Diplopia: No  Motion sickness: No    Exacerbation Factors:  Bending over: Yes  Turning Head: No  Rolling in bed: Yes  Walking: No  Looking up: No  Supine to/from sitting: Yes    Duration of Symptoms: less than 30 seconds     Concurrent Complaints:  Tinnitus:No  Aural Fullness:No  Known hearing loss:No  Nausea, Vomiting: No  Altered Vision: No  Poor Concentration: No  Memory Loss: No  Peripheral Neuropathy:Yes--carpal tunnel in R wrist  Cervical Pain: No   Headache: Yes--chronic      PHYSICAL FINDINGS:  Oculomotor ROM: WNL  Resting nystagmus: No  Gaze holding nystagmus No   Smooth pursuit Normal    Vertical Saccades:Normal  Horizontal Saccades:Normal  Convergence: Normal    Head thrust (room light): Normal    Dynamic Visual Acuity: NT  Dynamic Head: 20/  Static Head: 20/      DHI: score 28/100 =  mild self-perceived disability  (0-30 mild , 30-60 moderate,  severe disability)      Positional testing: Left Right   [place] Positive with subtle L up-beating torsional nystagmus Negative for nystagmus--very minor dizziness    Roll test: Negative for symptoms and nystagmus Negative for symptoms and nystagmus       Cervical ROM:  WNL--minor dizziness with flexion        Assessment: Kylee returns to outpatient physical therapy with return of her vertigo symptoms 3 ago. Re-evaluation was completed to reassess her vertigo symptoms. She had minor reported symptoms with cervical flexion and positive L [place]-hallpike with minor subtle L up-beating torsional nystagmus. This is consistent with previous L posterior canal BPPV with recurrence of symptoms. L epley was performed 3 times with less symptoms provoked during each maneuver. Her  was present and educated on BPPV diagnosis and self-epley maneuver handout was provided to them to perform at home over the next week or so due to busy schedule. They were educated to perform 2-3 rounds of the maneuver at home on days she is dizzy. If no dizziness present with her normal movements, advised not to do the maneuver. They verbalized understanding. She would benefit from continued skilled PT to address her dizziness symptoms and improve her independence with managing her symptoms in the future.      Goals  STGs in 4 weeks  1. Patient will be independent with HEP (for VOR, movement deficits, and/or balance as needed). - NEW  2. Patient will be able to perform all cervical ROM without provocation of symptoms. - NEW     LTGs in 8 weeks  1. Patient will not have any symptom provocation during any BPPV testing. - NEW  2. Patient will improve score on Dizziness Handicap Index by at least 17% for decreased perception of disability (17% is minimal detectable change). - NEW  3. Patient will be able to complete all bed mobility and household tasks without provocation of symptoms for improved QOL.  - NEW      Plan: Continue per plan of care.  Progress treatment as tolerated.      Planned therapy interventions: balance, neuromuscular re-education, canalith repositioning, patient/caregiver education,  postural training, strengthening, stretching, therapeutic activities, therapeutic exercise, graded exercise and home exercise program     Frequency: 2x week  Duration in weeks: 8    Plan of Care beginning date: 11/11/2024  Plan of Care expiration date: 1/11/2025     Short Term Goal Expiration Date:(12/11/2024)  Long Term Goal Expiration Date: (1/11/2025)  POC Expiration Date: (1/11/2025)      POC expires Unit limit Auth Expiration date PT/OT/ST + Visit Limit?   10/30/24  12/31/24 60 vpcy   1/11/25                          Visit/Unit Tracking  AUTH Status:  Date 8/30 9/6 9/23 10/7 11/11         60 visits pcy Used 1 IE 2 3 4 PN 5 RE          Remaining  59 58 57 56 55               Precautions n/a       Manuals 8/30 9/6 9/23 10/7 11/11                   Pt education BPPV diagnosis, prognosis, post maneuver precautions   Negative BPPV testing--high recurrence rate, PT on hold for 30 days in case symptoms return            Neuro Re-Ed         CRM L epley x2 rds    Self epley for HEP L epley x2 rds L epley x3 rds  L epley x3 rds    HEP of self-epley with 's assistance           VOR x1 viewing  HEP      Patric wolf  HEP                              Ther Ex                                                                        Ther Activity                        Gait Training                        Modalities

## 2024-11-25 ENCOUNTER — APPOINTMENT (OUTPATIENT)
Dept: PHYSICAL THERAPY | Facility: CLINIC | Age: 54
End: 2024-11-25
Payer: COMMERCIAL

## 2025-01-07 ENCOUNTER — OFFICE VISIT (OUTPATIENT)
Dept: URGENT CARE | Facility: CLINIC | Age: 55
End: 2025-01-07
Payer: COMMERCIAL

## 2025-01-07 VITALS
DIASTOLIC BLOOD PRESSURE: 73 MMHG | RESPIRATION RATE: 18 BRPM | TEMPERATURE: 98.2 F | OXYGEN SATURATION: 97 % | WEIGHT: 170.4 LBS | HEART RATE: 70 BPM | BODY MASS INDEX: 29.25 KG/M2 | SYSTOLIC BLOOD PRESSURE: 112 MMHG

## 2025-01-07 DIAGNOSIS — J01.11 ACUTE RECURRENT FRONTAL SINUSITIS: Primary | ICD-10-CM

## 2025-01-07 LAB — S PYO AG THROAT QL: NEGATIVE

## 2025-01-07 PROCEDURE — S9083 URGENT CARE CENTER GLOBAL: HCPCS | Performed by: PHYSICIAN ASSISTANT

## 2025-01-07 PROCEDURE — G0382 LEV 3 HOSP TYPE B ED VISIT: HCPCS | Performed by: PHYSICIAN ASSISTANT

## 2025-01-07 PROCEDURE — 87880 STREP A ASSAY W/OPTIC: CPT | Performed by: PHYSICIAN ASSISTANT

## 2025-01-07 RX ORDER — METHYLPREDNISOLONE 4 MG/1
TABLET ORAL
Qty: 21 TABLET | Refills: 0 | Status: SHIPPED | OUTPATIENT
Start: 2025-01-07

## 2025-01-07 NOTE — PATIENT INSTRUCTIONS
Discussed that symptoms are most likely viral in nature.  Will try course of steroids for symptoms and continuing over-the-counter cold medication.  Placed prescription for antibiotic to be filled if symptoms do not improve after course of steroids.  Encourage follow-up with ENT for further evaluation management of recurrent sinusitis.

## 2025-01-07 NOTE — PROGRESS NOTES
[facility]        NAME: Kylee De Paz is a 54 y.o. female  : 1970    MRN: [id number]  DATE: 2025  TIME: 3:09 PM    Assessment and Plan   Acute recurrent frontal sinusitis [J01.11]  1. Acute recurrent frontal sinusitis  POCT rapid strepA    methylPREDNISolone 4 MG tablet therapy pack    amoxicillin-clavulanate (AUGMENTIN) 875-125 mg per tablet            Patient Instructions     Patient Instructions   Discussed that symptoms are most likely viral in nature.  Will try course of steroids for symptoms and continuing over-the-counter cold medication.  Placed prescription for antibiotic to be filled if symptoms do not improve after course of steroids.  Encourage follow-up with ENT for further evaluation management of recurrent sinusitis.      Follow up with PCP in 3-5 days.  Proceed to  ER if symptoms worsen.    Chief Complaint     Chief Complaint   Patient presents with    Sinusitis    Sore Throat    Headache    Cough     All saturday         History of Present Illness       Patient is a 54-year-old female presenting today with cold-like symptoms x 1 week.  Patient notes over the last week or so she has had some nasal congestion, runny nose, cough and sinus pressure.  Has been taking over-the-counter cough and cold medication with no relief of her symptoms, notes they have been worsening over the last few days.  Has a history of sinusitis, has been under the care of ENT but has not had a visit in a few years.  Denies fever, chest tightness, SOB, N/V/D.        Review of Systems   Review of Systems   Constitutional:  Negative for chills, fatigue and fever.   HENT:  Positive for congestion, postnasal drip and sinus pressure. Negative for sore throat.    Eyes:  Negative for redness and itching.   Respiratory:  Positive for cough. Negative for chest tightness and shortness of breath.    Cardiovascular:  Negative for chest pain.   Gastrointestinal:  Negative for diarrhea, nausea and vomiting.    Musculoskeletal:  Negative for arthralgias and myalgias.   Neurological:  Negative for light-headedness and headaches.         Current Medications       Current Outpatient Medications:     albuterol (Ventolin HFA) 90 mcg/act inhaler, Inhale 2 puffs every 6 (six) hours as needed for wheezing, Disp: 18 g, Rfl: 5    amoxicillin-clavulanate (AUGMENTIN) 875-125 mg per tablet, Take 1 tablet by mouth every 12 (twelve) hours for 7 days, Disp: 14 tablet, Rfl: 0    cetirizine-pseudoephedrine (ZyrTEC-D) 5-120 MG per tablet, Take 1 tablet by mouth 2 (two) times a day, Disp: 20 tablet, Rfl: 0    estradiol (Climara) 0.025 mg/24 hr, Place 1 patch on the skin over 7 days once a week, Disp: 4 patch, Rfl: 3    fluticasone (FLONASE) 50 mcg/act nasal spray, 1 spray into each nostril daily, Disp: 9.9 mL, Rfl: 0    hydrocortisone 2.5 % ointment, APPLY A THIN LAYER TO RASH ON FACE 2 TIMES A DAY FOR 1 WEEK  THEN...  (REFER TO PRESCRIPTION NOTES)., Disp: , Rfl:     ibuprofen (MOTRIN) 200 mg tablet, Take 400 mg by mouth every 6 (six) hours as needed for mild pain, Disp: , Rfl:     meclizine (ANTIVERT) 12.5 MG tablet, Take 2 tablets (25 mg total) by mouth every 12 (twelve) hours as needed for dizziness, Disp: 30 tablet, Rfl: 0    methylPREDNISolone 4 MG tablet therapy pack, Use as directed on package, Disp: 21 tablet, Rfl: 0    ondansetron (ZOFRAN) 4 mg tablet, Take 1 tablet (4 mg total) by mouth every 8 (eight) hours as needed for nausea or vomiting, Disp: 20 tablet, Rfl: 0    Progesterone 100 MG CAPS, Take 100 mg by mouth daily at bedtime, Disp: 30 capsule, Rfl: 3    sertraline (Zoloft) 50 mg tablet, Take 1 tablet (50 mg total) by mouth daily, Disp: 90 tablet, Rfl: 1    cyclobenzaprine (FLEXERIL) 5 mg tablet, Take 1 tablet (5 mg total) by mouth 3 (three) times a day as needed for muscle spasms for up to 7 days, Disp: 20 tablet, Rfl: 0    tobramycin (TOBREX) 0.3 % SOLN, Administer 1 drop to both eyes every 4 (four) hours while awake  (Patient not taking: Reported on 1/7/2025), Disp: 10 mL, Rfl: 0    Current Allergies     Allergies as of 01/07/2025 - Reviewed 01/07/2025   Allergen Reaction Noted    Bactrim [sulfamethoxazole-trimethoprim] Hives 08/17/2018            The following portions of the patient's history were reviewed and updated as appropriate: allergies, current medications, past family history, past medical history, past social history, past surgical history and problem list.     Past Medical History:   Diagnosis Date    Abnormal Pap smear of cervix     Allergic 2019    Supha drugs    Anxiety     Carpal tunnel syndrome     right wrist    Chronic back pain     Depression     Genital warts     Herpes     Menopause ovarian failure     Obesity (BMI 30.0-34.9) 11/03/2020    Urinary incontinence     Vulvar ulcer        Past Surgical History:   Procedure Laterality Date    CARPAL TUNNEL RELEASE Left 10/2021    CONDYLOMA EXCISION/FULGURATION      NO PAST SURGERIES         Family History   Problem Relation Age of Onset    Hypertension Mother     Vision loss Mother         UNSURE WHY    Macular degeneration Mother     Atrial fibrillation Mother         Atrial stenosis    Alcohol abuse Father     Mental illness Father     Schizophrenia Father     Breast cancer Maternal Aunt          Medications have been verified.        Objective   /73   Pulse 70   Temp 98.2 °F (36.8 °C)   Resp 18   Wt 77.3 kg (170 lb 6.4 oz)   SpO2 97%   BMI 29.25 kg/m²        Physical Exam     Physical Exam  Vitals and nursing note reviewed.   Constitutional:       General: She is not in acute distress.     Appearance: Normal appearance.   HENT:      Head: Normocephalic and atraumatic.      Right Ear: Tympanic membrane, ear canal and external ear normal.      Left Ear: Tympanic membrane, ear canal and external ear normal.      Nose: Congestion present.      Right Turbinates: Swollen and pale.      Left Turbinates: Swollen and pale.      Right Sinus: Frontal sinus  tenderness present. No maxillary sinus tenderness.      Left Sinus: Frontal sinus tenderness present. No maxillary sinus tenderness.      Mouth/Throat:      Mouth: Mucous membranes are moist.      Pharynx: Oropharynx is clear. No oropharyngeal exudate or posterior oropharyngeal erythema.   Eyes:      Conjunctiva/sclera: Conjunctivae normal.      Pupils: Pupils are equal, round, and reactive to light.   Cardiovascular:      Rate and Rhythm: Normal rate and regular rhythm.      Pulses: Normal pulses.      Heart sounds: Normal heart sounds.   Pulmonary:      Effort: Pulmonary effort is normal.      Breath sounds: Normal breath sounds.   Musculoskeletal:      Cervical back: Normal range of motion. No tenderness.   Lymphadenopathy:      Cervical: No cervical adenopathy.   Skin:     General: Skin is warm.      Capillary Refill: Capillary refill takes less than 2 seconds.   Neurological:      Mental Status: She is alert.

## 2025-01-22 ENCOUNTER — RA CDI HCC (OUTPATIENT)
Dept: OTHER | Facility: HOSPITAL | Age: 55
End: 2025-01-22

## 2025-01-22 NOTE — PROGRESS NOTES
HCC coding opportunities       Chart reviewed, no opportunity found: CHART REVIEWED, NO OPPORTUNITY FOUND        Patients Insurance        Commercial Insurance: [employer]

## 2025-01-29 ENCOUNTER — OFFICE VISIT (OUTPATIENT)
Dept: INTERNAL MEDICINE CLINIC | Age: 55
End: 2025-01-29
Payer: COMMERCIAL

## 2025-01-29 VITALS
HEART RATE: 99 BPM | DIASTOLIC BLOOD PRESSURE: 74 MMHG | WEIGHT: 171.4 LBS | BODY MASS INDEX: 29.42 KG/M2 | TEMPERATURE: 98.1 F | SYSTOLIC BLOOD PRESSURE: 118 MMHG | OXYGEN SATURATION: 96 %

## 2025-01-29 DIAGNOSIS — E34.8 PINEAL GLAND CYST: ICD-10-CM

## 2025-01-29 DIAGNOSIS — J45.20 MILD INTERMITTENT ASTHMA WITHOUT COMPLICATION: ICD-10-CM

## 2025-01-29 DIAGNOSIS — Z23 ENCOUNTER FOR IMMUNIZATION: ICD-10-CM

## 2025-01-29 DIAGNOSIS — R42 VERTIGO: ICD-10-CM

## 2025-01-29 DIAGNOSIS — E04.1 THYROID NODULE: ICD-10-CM

## 2025-01-29 DIAGNOSIS — F41.8 ANXIETY WITH DEPRESSION: Primary | ICD-10-CM

## 2025-01-29 DIAGNOSIS — Z23 NEED FOR SHINGLES VACCINE: ICD-10-CM

## 2025-01-29 PROCEDURE — 90750 HZV VACC RECOMBINANT IM: CPT

## 2025-01-29 PROCEDURE — 90471 IMMUNIZATION ADMIN: CPT

## 2025-01-29 PROCEDURE — 99214 OFFICE O/P EST MOD 30 MIN: CPT | Performed by: INTERNAL MEDICINE

## 2025-01-29 RX ORDER — FLUOXETINE 10 MG/1
10 CAPSULE ORAL DAILY
Qty: 30 CAPSULE | Refills: 3 | Status: SHIPPED | OUTPATIENT
Start: 2025-01-29 | End: 2025-03-18 | Stop reason: SDUPTHER

## 2025-01-29 NOTE — PROGRESS NOTES
Name: Kylee De Paz      : 1970      MRN: [id number]  Encounter Provider: Temo Silver MD  Encounter Date: 2025   Encounter department: [facility]  :  Assessment & Plan  Anxiety with depression      Orders:    FLUoxetine (PROzac) 10 mg capsule; Take 1 capsule (10 mg total) by mouth daily    Vertigo  resolved       Mild intermittent asthma without complication  stable       Pineal gland cyst    Orders:    MRI brain w wo contrast; Future    Comprehensive metabolic panel; Future           History of Present Illness   This is a very pleasant 54 years young lady who is here today for the regular follow-up she is doing well except for blurred vision she went to the eye doctor several times and they could not find anything she thinks that it could be from the Zoloft she wanted to stop the Zoloft and try something else which is neutral on the weight and also does not cause any problem with the sexual dysfunction at this point we will we discussed about it and decided that she will take 10 mg of Prozac certainly I can increase the dose I will see her back in about 3 months    History of pineal cyst August last August we did the MRI they recommended MRI in 12 months she does not have any symptoms right now I will get the MRI and follow-up    Patient has an ultrasound of thyroid for question about thyroid nodule but it looks like the nodule is outside the thyroid recommended PTH level although her calcium is normal so I doubt that normal calcium and phosphorus she had any problem with the parathyroid problem but we will get the parathyroid test done for review    Anxiety is still playing a major role into her life and she is taking sertraline which was helping but now she having problem with the sertraline and will change to the Prozac and see how she does with that          Review of Systems   Constitutional:  Positive for fatigue. Negative for chills.   HENT:  Negative for  congestion, ear pain, hearing loss, postnasal drip, sinus pressure, sore throat and voice change.    Eyes:  Positive for visual disturbance. Negative for pain and discharge.   Respiratory:  Negative for cough, chest tightness and shortness of breath.    Cardiovascular:  Negative for chest pain, palpitations and leg swelling.   Gastrointestinal:  Negative for abdominal pain, blood in stool, diarrhea, nausea and rectal pain.   Genitourinary:  Negative for difficulty urinating, dysuria and urgency.   Musculoskeletal:  Negative for arthralgias and joint swelling.   Skin:  Negative for rash.   Allergic/Immunologic: Negative for environmental allergies and food allergies.   Neurological:  Negative for dizziness, tremors, weakness, numbness and headaches.   Hematological:  Negative for adenopathy.   Psychiatric/Behavioral:  Negative for behavioral problems and hallucinations. The patient is nervous/anxious.        Objective   /74 (BP Location: Left arm, Patient Position: Sitting, Cuff Size: Standard)   Pulse 99   Temp 98.1 °F (36.7 °C) (Temporal)   Wt 77.7 kg (171 lb 6.4 oz)   SpO2 96%   BMI 29.42 kg/m²      Physical Exam  HENT:      Head: Normocephalic.   Eyes:      Pupils: Pupils are equal, round, and reactive to light.   Cardiovascular:      Rate and Rhythm: Normal rate and regular rhythm.      Heart sounds: No murmur heard.  Pulmonary:      Breath sounds: Normal breath sounds.   Abdominal:      General: Bowel sounds are normal.      Palpations: Abdomen is soft.   Musculoskeletal:      Cervical back: Normal range of motion.   Skin:     General: Skin is warm.   Neurological:      Mental Status: She is alert and oriented to person, place, and time.   Psychiatric:         Behavior: Behavior normal.         Thought Content: Thought content normal.

## 2025-02-03 ENCOUNTER — TELEPHONE (OUTPATIENT)
Age: 55
End: 2025-02-03

## 2025-02-03 NOTE — TELEPHONE ENCOUNTER
Pt called having a UTI, burning and frequent urination. Was wondering if Dr Silver needs to order a urine test. She needs antibiotics. Please advise and notify pt

## 2025-02-03 NOTE — TELEPHONE ENCOUNTER
Spoke wit patient- Patient states UTI symptoms started today. Patient is requesting UA order to be placed so she can go to [facility] to give specimen. Patient declined to be seen in office. Patient states she lives over an hour away from the office and is unable to come in. Advised patient could go to  for immediate assistance but would like to avoid that since she has a high copay.  Patient is aware Dr. Silver is working in hospital this week and unsure when he will respond to this concern/message. Patient asked that this message be sent to all providers in the office if order can possibly be placed. Please advise, thank you.

## 2025-02-04 DIAGNOSIS — R30.0 DYSURIA: Primary | ICD-10-CM

## 2025-02-04 LAB
ALBUMIN SERPL-MCNC: 4.4 G/DL (ref 3.5–5.7)
ALP SERPL-CCNC: 76 U/L (ref 35–120)
ALT SERPL-CCNC: 14 U/L
ANION GAP SERPL CALCULATED.3IONS-SCNC: 7 MMOL/L (ref 3–11)
AST SERPL-CCNC: 18 U/L
BACTERIA URNS QL MICRO: ABNORMAL
BILIRUB SERPL-MCNC: 0.7 MG/DL (ref 0.2–1)
BUN SERPL-MCNC: 19 MG/DL (ref 7–25)
CALCIUM SERPL-MCNC: 9.6 MG/DL (ref 8.5–10.5)
CHLORIDE SERPL-SCNC: 103 MMOL/L (ref 100–109)
CO2 SERPL-SCNC: 29 MMOL/L (ref 21–31)
CREAT SERPL-MCNC: 0.62 MG/DL (ref 0.4–1.1)
CYTOLOGY CMNT CVX/VAG CYTO-IMP: ABNORMAL
GFR/BSA.PRED SERPLBLD CYS-BASED-ARV: 105 ML/MIN/{1.73_M2}
GLUCOSE SERPL-MCNC: 103 MG/DL (ref 65–99)
GLUCOSE UR QL STRIP: NEGATIVE MG/DL
HGB UR QL STRIP: ABNORMAL MG/DL
KETONES UR QL STRIP: NEGATIVE MG/DL
LEUKOCYTE ESTERASE UR QL STRIP: ABNORMAL /UL
MUCOUS THREADS URNS QL MICRO: ABNORMAL
NITRITE UR QL STRIP: NEGATIVE
PH UR: 6 [PH] (ref 4.5–8)
POTASSIUM SERPL-SCNC: 4.1 MMOL/L (ref 3.5–5.2)
PROT 24H UR-MRATE: NEGATIVE MG/DL
PROT SERPL-MCNC: 6.7 G/DL (ref 6.3–8.3)
PTH-INTACT SERPL-MCNC: 41.3 PG/ML (ref 12–88)
RBC #/AREA URNS HPF: ABNORMAL /HPF (ref 0–2)
SL AMB POCT URINE COMMENT: ABNORMAL
SODIUM SERPL-SCNC: 139 MMOL/L (ref 135–145)
SP GR UR: 1.01 (ref 1–1.03)
SQUAMOUS #/AREA URNS HPF: >10 /LPF (ref 0–5)
WBC #/AREA URNS HPF: ABNORMAL /HPF (ref 0–5)

## 2025-02-05 ENCOUNTER — RESULTS FOLLOW-UP (OUTPATIENT)
Dept: INTERNAL MEDICINE CLINIC | Age: 55
End: 2025-02-05

## 2025-02-05 DIAGNOSIS — R39.9 UTI SYMPTOMS: Primary | ICD-10-CM

## 2025-02-05 RX ORDER — CIPROFLOXACIN 500 MG/1
500 TABLET, FILM COATED ORAL EVERY 12 HOURS SCHEDULED
Qty: 10 TABLET | Refills: 0 | Status: SHIPPED | OUTPATIENT
Start: 2025-02-05 | End: 2025-02-10

## 2025-02-06 ENCOUNTER — RESULTS FOLLOW-UP (OUTPATIENT)
Dept: INTERNAL MEDICINE CLINIC | Age: 55
End: 2025-02-06

## 2025-02-06 LAB — LEGIONELLA SPEC CULT: NORMAL

## 2025-02-12 ENCOUNTER — TELEPHONE (OUTPATIENT)
Dept: GYNECOLOGY | Facility: CLINIC | Age: 55
End: 2025-02-12

## 2025-02-12 NOTE — TELEPHONE ENCOUNTER
"Patient called stating that her estrogen patches are not sticking. Do you have a different brand or suggestion? Also, she rescheduled her med check appt for April 25, because she just started the meds a few weeks ago. Third thing, she would like 3mo supply of her scripts sent to [facility]. [facility] will not fill them any more as she reached her max \"maintenance med refills\" that her insurance covers. If it doesn't go to [facility], she would have to pay out of pocket. Thank you. (Please respond to pod or clinical team- I will be off until next week)  "

## 2025-02-13 DIAGNOSIS — R23.2 HOT FLASHES: ICD-10-CM

## 2025-02-13 RX ORDER — PROGESTERONE 100 MG/1
100 CAPSULE ORAL
Qty: 90 CAPSULE | Refills: 0 | Status: SHIPPED | OUTPATIENT
Start: 2025-02-13

## 2025-02-13 RX ORDER — ESTRADIOL 0.03 MG/D
1 FILM, EXTENDED RELEASE TRANSDERMAL 2 TIMES WEEKLY
Qty: 24 PATCH | Refills: 0 | Status: SHIPPED | OUTPATIENT
Start: 2025-02-13

## 2025-03-09 ENCOUNTER — HOSPITAL ENCOUNTER (EMERGENCY)
Facility: HOSPITAL | Age: 55
Discharge: HOME | End: 2025-03-09
Attending: EMERGENCY MEDICINE | Admitting: EMERGENCY MEDICINE
Payer: COMMERCIAL

## 2025-03-09 ENCOUNTER — APPOINTMENT (EMERGENCY)
Dept: RADIOLOGY | Facility: HOSPITAL | Age: 55
End: 2025-03-09
Payer: COMMERCIAL

## 2025-03-09 VITALS
OXYGEN SATURATION: 97 % | DIASTOLIC BLOOD PRESSURE: 77 MMHG | SYSTOLIC BLOOD PRESSURE: 143 MMHG | HEART RATE: 70 BPM | TEMPERATURE: 98 F | RESPIRATION RATE: 18 BRPM

## 2025-03-09 DIAGNOSIS — S82.832A CLOSED FRACTURE OF DISTAL END OF LEFT FIBULA: Primary | ICD-10-CM

## 2025-03-09 PROCEDURE — 73610 X-RAY EXAM OF ANKLE: CPT

## 2025-03-09 PROCEDURE — 99284 EMERGENCY DEPT VISIT MOD MDM: CPT | Performed by: EMERGENCY MEDICINE

## 2025-03-09 PROCEDURE — 99283 EMERGENCY DEPT VISIT LOW MDM: CPT

## 2025-03-09 NOTE — DISCHARGE INSTRUCTIONS
Wear fracture boot as indicated.  I would recommend that you follow-up with an orthopedist to ensure that this fracture heals appropriately.  You may continue to use ice every 6 hours for discomfort as well as Motrin or Tylenol.  I have contacted the orthopedic service to let them know that you may be calling to set up an appointment.  I would call tomorrow to get on their schedule.    To the ER for any new, concerning, worsening issues.

## 2025-03-09 NOTE — ED PROVIDER NOTES
Time reflects when diagnosis was documented in both MDM as applicable and the Disposition within this note       Time User Action Codes Description Comment    3/9/2025  1:15 PM Bryon Andrea Add [S82.832A] Closed fracture of distal end of left fibula           ED Disposition       ED Disposition   Discharge    Condition   Stable    Date/Time   Sun Mar 9, 2025  1:17 PM    Comment   Kylee De Paz discharge to home/self care.                   Assessment & Plan       Medical Decision Making  54-year-old female presents emergency room presents to the emergency department status post inversion injury of the left ankle after walking on uneven pavement.  Patient has increasing pain with ambulation over the lateral aspect of the left ankle.  Patient notes that the injury occurred yesterday.  Differential diagnosis is sprain versus fracture.  X-ray was taken of the left ankle which shows a nondisplaced left distal fibula fracture.  The patient was placed in a cam boot and offered crutches.  The patient was told follow-up with orthopedic surgery and use ice, Tylenol and Motrin for pain.  Patient was told to return for new or concerning issues.  Patient discharged.    Amount and/or Complexity of Data Reviewed  Radiology: ordered and independent interpretation performed.             Medications - No data to display    ED Risk Strat Scores                            SBIRT 22yo+      Flowsheet Row Most Recent Value   Initial Alcohol Screen: US AUDIT-C     1. How often do you have a drink containing alcohol? 0 Filed at: 03/09/2025 1225   2. How many drinks containing alcohol do you have on a typical day you are drinking?  0 Filed at: 03/09/2025 1225   3a. Male UNDER 65: How often do you have five or more drinks on one occasion? 0 Filed at: 03/09/2025 1225   3b. FEMALE Any Age, or MALE 65+: How often do you have 4 or more drinks on one occassion? 0 Filed at: 03/09/2025 1225   Audit-C Score 0 Filed at: 03/09/2025 1225   LUCAS:  How many times in the past year have you...    Used an illegal drug or used a prescription medication for non-medical reasons? Never Filed at: 03/09/2025 1225                            History of Present Illness       Chief Complaint   Patient presents with    Ankle Injury     Rolled ankle        Past Medical History:   Diagnosis Date    Abnormal Pap smear of cervix     Allergic 2019    Supha drugs    Anxiety     Carpal tunnel syndrome     right wrist    Chronic back pain     Depression     Genital warts     Herpes     Menopause ovarian failure     Obesity (BMI 30.0-34.9) 11/03/2020    Urinary incontinence     Vulvar ulcer       Past Surgical History:   Procedure Laterality Date    CARPAL TUNNEL RELEASE Left 10/2021    CONDYLOMA EXCISION/FULGURATION      NO PAST SURGERIES        Family History   Problem Relation Age of Onset    Hypertension Mother     Vision loss Mother         UNSURE WHY    Macular degeneration Mother     Atrial fibrillation Mother         Atrial stenosis    Alcohol abuse Father     Mental illness Father     Schizophrenia Father     Breast cancer Maternal Aunt       Social History     Tobacco Use    Smoking status: Never    Smokeless tobacco: Never   Vaping Use    Vaping status: Never Used   Substance Use Topics    Alcohol use: Yes     Alcohol/week: 4.0 standard drinks of alcohol     Types: 4 Glasses of wine per week     Comment: socially    Drug use: Never     Comment: Denied drug use      E-Cigarette/Vaping    E-Cigarette Use Never User       E-Cigarette/Vaping Substances    Nicotine No     THC No     CBD No     Flavoring No     Other No     Unknown No       I have reviewed and agree with the history as documented.     Female presents emergency department secondary to ankle injury.  Patient notes that she was walking on uneven pavement yesterday evening and noted that her ankle on the left sustained an inversion injury.  The patient notes that she is having increasing pain and now it has lost  ability to bear weight due to increasing pain on the lateral aspect.  Patient denies any numbness weakness or blood thinner use.  The patient denies any other injuries and came to the ER today for evaluation.        Review of Systems   Constitutional:  Positive for activity change.   Cardiovascular:  Negative for chest pain and palpitations.   Gastrointestinal:  Negative for abdominal pain and vomiting.   Musculoskeletal:  Positive for arthralgias and myalgias. Negative for back pain.   Skin:  Negative for color change and rash.   All other systems reviewed and are negative.          Objective       ED Triage Vitals   Temperature Pulse Blood Pressure Respirations SpO2 Patient Position - Orthostatic VS   03/09/25 1226 03/09/25 1226 03/09/25 1226 03/09/25 1245 03/09/25 1226 03/09/25 1245   98 °F (36.7 °C) 75 141/70 18 95 % Sitting      Temp src Heart Rate Source BP Location FiO2 (%) Pain Score    -- 03/09/25 1245 03/09/25 1245 -- --     Monitor Left arm        Vitals      Date and Time Temp Pulse SpO2 Resp BP Pain Score FACES Pain Rating User   03/09/25 1310 98 °F (36.7 °C) 70 97 % 18 143/77 -- -- AP   03/09/25 1251 -- -- -- 18 -- -- -- AP   03/09/25 1245 -- 76 98 % 18 131/66 -- -- AP   03/09/25 1226 98 °F (36.7 °C) 75 95 % -- 141/70 -- -- RTM            Physical Exam  Vitals and nursing note reviewed.   Constitutional:       General: She is not in acute distress.     Appearance: She is well-developed.   HENT:      Head: Normocephalic and atraumatic.   Eyes:      Conjunctiva/sclera: Conjunctivae normal.   Cardiovascular:      Rate and Rhythm: Normal rate and regular rhythm.      Heart sounds: No murmur heard.  Pulmonary:      Effort: Pulmonary effort is normal. No respiratory distress.      Breath sounds: Normal breath sounds.   Abdominal:      Palpations: Abdomen is soft.      Tenderness: There is no abdominal tenderness.   Musculoskeletal:         General: Swelling, tenderness and signs of injury present.       Cervical back: Neck supple.      Right lower leg: No edema.      Left lower leg: No edema.      Comments: Patient with pain over the distal fibula as well as the anterior talofibular ligament region.  There is no base of the fifth metatarsal pain or calcaneus tenderness.  There is no anterior posterior drawer of the ankle.  Cap refill is less than 3 seconds and sensation is intact.   Skin:     General: Skin is warm and dry.      Capillary Refill: Capillary refill takes less than 2 seconds.      Coloration: Skin is not pale.      Findings: No bruising.   Neurological:      General: No focal deficit present.      Mental Status: She is alert.   Psychiatric:         Mood and Affect: Mood normal.         Results Reviewed       None            XR ankle 3+ views LEFT   ED Interpretation by Bryon Andrea Jr., DO ([id number])   Fibula fracture.          Procedures    ED Medication and Procedure Management   Prior to Admission Medications   Prescriptions Last Dose Informant Patient Reported? Taking?   FLUoxetine (PROzac) 10 mg capsule   No No   Sig: Take 1 capsule (10 mg total) by mouth daily   Progesterone 100 MG CAPS   No No   Sig: Take 100 mg by mouth daily at bedtime   albuterol (Ventolin HFA) 90 mcg/act inhaler  Self No No   Sig: Inhale 2 puffs every 6 (six) hours as needed for wheezing   cetirizine-pseudoephedrine (ZyrTEC-D) 5-120 MG per tablet  Self No No   Sig: Take 1 tablet by mouth 2 (two) times a day   cyclobenzaprine (FLEXERIL) 5 mg tablet  Self No No   Sig: Take 1 tablet (5 mg total) by mouth 3 (three) times a day as needed for muscle spasms for up to 7 days   estradiol (Clarisa) 0.025 MG/24HR   No No   Sig: Place 1 patch on the skin 2 (two) times a week   fluticasone (FLONASE) 50 mcg/act nasal spray  Self No No   Si spray into each nostril daily   hydrocortisone 2.5 % ointment  Self Yes No   Sig: APPLY A THIN LAYER TO RASH ON FACE 2 TIMES A DAY FOR 1 WEEK  THEN...  (REFER TO PRESCRIPTION NOTES).    ibuprofen (MOTRIN) 200 mg tablet  Self Yes No   Sig: Take 400 mg by mouth every 6 (six) hours as needed for mild pain   meclizine (ANTIVERT) 12.5 MG tablet  Self No No   Sig: Take 2 tablets (25 mg total) by mouth every 12 (twelve) hours as needed for dizziness   ondansetron (ZOFRAN) 4 mg tablet  Self No No   Sig: Take 1 tablet (4 mg total) by mouth every 8 (eight) hours as needed for nausea or vomiting      Facility-Administered Medications: None     Discharge Medication List as of 3/9/2025  1:30 PM        CONTINUE these medications which have NOT CHANGED    Details   albuterol (Ventolin HFA) 90 mcg/act inhaler Inhale 2 puffs every 6 (six) hours as needed for wheezing, Starting Mon 10/24/2022, Normal      cetirizine-pseudoephedrine (ZyrTEC-D) 5-120 MG per tablet Take 1 tablet by mouth 2 (two) times a day, Starting Fri 11/1/2024, Normal      cyclobenzaprine (FLEXERIL) 5 mg tablet Take 1 tablet (5 mg total) by mouth 3 (three) times a day as needed for muscle spasms for up to 7 days, Starting Fri 6/7/2024, Until Wed 1/29/2025 at 2359, Normal      estradiol (Clarisa) 0.025 MG/24HR Place 1 patch on the skin 2 (two) times a week, Starting u 2/13/2025, Normal      FLUoxetine (PROzac) 10 mg capsule Take 1 capsule (10 mg total) by mouth daily, Starting Wed 1/29/2025, Normal      fluticasone (FLONASE) 50 mcg/act nasal spray 1 spray into each nostril daily, Starting Fri 11/1/2024, Normal      hydrocortisone 2.5 % ointment APPLY A THIN LAYER TO RASH ON FACE 2 TIMES A DAY FOR 1 WEEK  THEN...  (REFER TO PRESCRIPTION NOTES)., Historical Med      ibuprofen (MOTRIN) 200 mg tablet Take 400 mg by mouth every 6 (six) hours as needed for mild pain, Historical Med      meclizine (ANTIVERT) 12.5 MG tablet Take 2 tablets (25 mg total) by mouth every 12 (twelve) hours as needed for dizziness, Starting Sat 8/24/2024, Normal      ondansetron (ZOFRAN) 4 mg tablet Take 1 tablet (4 mg total) by mouth every 8 (eight) hours as needed for nausea  or vomiting, Starting Wed 8/28/2024, Normal      Progesterone 100 MG CAPS Take 100 mg by mouth daily at bedtime, Starting Thu 2/13/2025, Normal             ED SEPSIS DOCUMENTATION   Time reflects when diagnosis was documented in both MDM as applicable and the Disposition within this note       Time User Action Codes Description Comment    3/9/2025  1:15 PM Bryon Andrea Add [S82.832A] Closed fracture of distal end of left fibula                  Bryon Andrea Jr., DO  03/09/25 2674

## 2025-03-18 DIAGNOSIS — F41.8 ANXIETY WITH DEPRESSION: ICD-10-CM

## 2025-03-18 NOTE — TELEPHONE ENCOUNTER
Pharmacy change to mail order    Reason for call:   [x] Refill   [] Prior Auth  [] Other:     Office:   [x] PCP/Provider - Sorathia  [] Specialty/Provider -     Medication:   Fluoxetine 10 mg, 1 qd, 90    Pharmacy:   [facility]    Local Pharmacy   Does the patient have enough for 3 days?   [x] Yes   [] No - Send as HP to POD    Mail Away Pharmacy   Does the patient have enough for 10 days?   [] Yes   [] No - Send as HP to POD

## 2025-03-19 RX ORDER — FLUOXETINE 10 MG/1
10 CAPSULE ORAL DAILY
Qty: 90 CAPSULE | Refills: 1 | Status: SHIPPED | OUTPATIENT
Start: 2025-03-19

## 2025-03-29 ENCOUNTER — OFFICE VISIT (OUTPATIENT)
Dept: URGENT CARE | Facility: CLINIC | Age: 55
End: 2025-03-29
Payer: COMMERCIAL

## 2025-03-29 VITALS
OXYGEN SATURATION: 98 % | SYSTOLIC BLOOD PRESSURE: 131 MMHG | TEMPERATURE: 98.4 F | HEART RATE: 80 BPM | RESPIRATION RATE: 18 BRPM | DIASTOLIC BLOOD PRESSURE: 59 MMHG

## 2025-03-29 DIAGNOSIS — Z23 ENCOUNTER FOR IMMUNIZATION: ICD-10-CM

## 2025-03-29 DIAGNOSIS — S40.811A ABRASION OF RIGHT ARM, INITIAL ENCOUNTER: Primary | ICD-10-CM

## 2025-03-29 PROCEDURE — 90715 TDAP VACCINE 7 YRS/> IM: CPT

## 2025-03-29 PROCEDURE — S9083 URGENT CARE CENTER GLOBAL: HCPCS | Performed by: PHYSICIAN ASSISTANT

## 2025-03-29 PROCEDURE — 90471 IMMUNIZATION ADMIN: CPT | Performed by: PHYSICIAN ASSISTANT

## 2025-03-29 PROCEDURE — G0382 LEV 3 HOSP TYPE B ED VISIT: HCPCS | Performed by: PHYSICIAN ASSISTANT

## 2025-03-29 NOTE — PROGRESS NOTES
[facility]    NAME: Kylee De Paz is a 54 y.o. female  : 1970    MRN: [id number]  DATE: 2025  TIME: 6:37 PM    Assessment and Plan   Abrasion of right arm, initial encounter [S40.811A]  1. Abrasion of right arm, initial encounter  Tdap Vaccine greater than or equal to 6yo      2. Encounter for immunization  Tdap Vaccine greater than or equal to 6yo          Patient Instructions     Patient Instructions   Antibiotic ointment twice daily. Keep area clean and dry.  Tetanus updated today.    Chief Complaint     Chief Complaint   Patient presents with    Extremity Laceration     Right arm lac at 4pm, not bleeding. Pt requesting tetanus. Was cut on metal - possibly maría elena       History of Present Illness   54-year-old female here with a cut on her right forearm.  Cut it on a piece of metal today.  Needs tetanus updated.        Review of Systems   Review of Systems   Constitutional:  Negative for chills and fever.   Respiratory:  Negative for cough and shortness of breath.    Cardiovascular:  Negative for chest pain.   Skin:  Positive for wound.       Current Medications     Current Outpatient Medications:     albuterol (Ventolin HFA) 90 mcg/act inhaler, Inhale 2 puffs every 6 (six) hours as needed for wheezing, Disp: 18 g, Rfl: 5    cetirizine-pseudoephedrine (ZyrTEC-D) 5-120 MG per tablet, Take 1 tablet by mouth 2 (two) times a day, Disp: 20 tablet, Rfl: 0    estradiol (Clarisa) 0.025 MG/24HR, Place 1 patch on the skin 2 (two) times a week, Disp: 24 patch, Rfl: 0    FLUoxetine (PROzac) 10 mg capsule, Take 1 capsule (10 mg total) by mouth daily, Disp: 90 capsule, Rfl: 1    fluticasone (FLONASE) 50 mcg/act nasal spray, 1 spray into each nostril daily, Disp: 9.9 mL, Rfl: 0    hydrocortisone 2.5 % ointment, APPLY A THIN LAYER TO RASH ON FACE 2 TIMES A DAY FOR 1 WEEK  THEN...  (REFER TO PRESCRIPTION NOTES)., Disp: , Rfl:     ibuprofen (MOTRIN) 200 mg tablet, Take 400 mg by mouth every 6 (six) hours as  needed for mild pain, Disp: , Rfl:     meclizine (ANTIVERT) 12.5 MG tablet, Take 2 tablets (25 mg total) by mouth every 12 (twelve) hours as needed for dizziness, Disp: 30 tablet, Rfl: 0    ondansetron (ZOFRAN) 4 mg tablet, Take 1 tablet (4 mg total) by mouth every 8 (eight) hours as needed for nausea or vomiting, Disp: 20 tablet, Rfl: 0    Progesterone 100 MG CAPS, Take 100 mg by mouth daily at bedtime, Disp: 90 capsule, Rfl: 0    cyclobenzaprine (FLEXERIL) 5 mg tablet, Take 1 tablet (5 mg total) by mouth 3 (three) times a day as needed for muscle spasms for up to 7 days, Disp: 20 tablet, Rfl: 0    Current Allergies     Allergies as of 03/29/2025 - Reviewed 03/29/2025   Allergen Reaction Noted    Bactrim [sulfamethoxazole-trimethoprim] Hives 08/17/2018          The following portions of the patient's history were reviewed and updated as appropriate: allergies, current medications, past family history, past medical history, past social history, past surgical history and problem list.   Past Medical History:   Diagnosis Date    Abnormal Pap smear of cervix     Allergic 2019    Supha drugs    Anxiety     Carpal tunnel syndrome     right wrist    Chronic back pain     Depression     Genital warts     Herpes     Menopause ovarian failure     Obesity (BMI 30.0-34.9) 11/03/2020    Urinary incontinence     Vulvar ulcer      Past Surgical History:   Procedure Laterality Date    CARPAL TUNNEL RELEASE Left 10/2021    CONDYLOMA EXCISION/FULGURATION      NO PAST SURGERIES       Family History   Problem Relation Age of Onset    Hypertension Mother     Vision loss Mother         UNSURE WHY    Macular degeneration Mother     Atrial fibrillation Mother         Atrial stenosis    Alcohol abuse Father     Mental illness Father     Schizophrenia Father     Breast cancer Maternal Aunt      Social History     Socioeconomic History    Marital status: /Civil Union     Spouse name: Not on file    Number of children: Not on file     Years of education: Not on file    Highest education level: Not on file   Occupational History    Not on file   Tobacco Use    Smoking status: Never    Smokeless tobacco: Never   Vaping Use    Vaping status: Never Used   Substance and Sexual Activity    Alcohol use: Yes     Alcohol/week: 4.0 standard drinks of alcohol     Types: 4 Glasses of wine per week     Comment: socially    Drug use: Never     Comment: Denied drug use    Sexual activity: Yes     Partners: Male     Birth control/protection: Post-menopausal   Other Topics Concern    Not on file   Social History Narrative    Not on file     Social Drivers of Health     Financial Resource Strain: Not on file   Food Insecurity: Not on file   Transportation Needs: Not on file   Physical Activity: Not on file   Stress: Not on file (11/4/2024)   Social Connections: Not on file   Intimate Partner Violence: Low Risk  (4/13/2021)    Received from [facility], [facility]    Intimate Partner Violence     Insults You: Not on file     Threatens You: Not on file     Screams at You: Not on file     Physically Hurt: Not on file     Intimate Partner Violence Score: Not on file   Housing Stability: Not on file     Medications have been verified.    Objective   /59   Pulse 80   Temp 98.4 °F (36.9 °C)   Resp 18   SpO2 98%      Physical Exam   Physical Exam  Vitals and nursing note reviewed.   Constitutional:       Appearance: Normal appearance.   Cardiovascular:      Rate and Rhythm: Normal rate and regular rhythm.      Pulses: Normal pulses.      Heart sounds: Normal heart sounds.   Pulmonary:      Effort: Pulmonary effort is normal.      Breath sounds: Normal breath sounds.   Musculoskeletal:      Cervical back: Normal range of motion.   Skin:            Comments: Right forearm with long abrasion present, superficial laceration.  Wound edges still approximated.     Neurological:      Mental Status: She is alert.                      daughter

## 2025-04-21 NOTE — PROGRESS NOTES
Name: Kylee De Paz      : 1970      MRN: [id number]  Encounter Provider: Adalgisa hTomas DO  Encounter Date: 2025   Enc[facility]  :  Assessment & Plan  Hot flashes    Orders:    Progesterone 100 MG CAPS; Take 100 mg by mouth daily at bedtime    estradiol (Clarisa) 0.0375 MG/24HR; Place 1 patch on the skin 2 (two) times a week    Vaginal dryness    Orders:    estradiol (ESTRACE VAGINAL) 0.1 mg/g vaginal cream; Insert 1 g into the vagina 2 (two) times a week At bedtime      Hot flashes - we discussed increasing her estrogen dose so that she will not  have as many hot flashes.  RX sent for 0.0375mg estradiol and progesterone 100mg.  If hot flashes persist, she will call and we can increase.    Decreased libido - discussed this in detail.  She is on prozac which could  exacerbate this.  Briefly discussed using prasterone for vaginal dryness, this could possible help with libido along with dryness.  She does not want this but would like to try vaginal estrogen cream.  RX sent    History of Present Illness   HPI  Kylee De Paz is a 54 y.o. female who presents for follow up. She was started on an estradiol patch and oral progesterone for hot flashes.  She was switched to a twice a week patch due to adhesion issues.  This is working well for her. She denies side effects, has had no bleeding.  BP is good.  She is sleeping better. She is happy with this and would like to continue.    She is still having hot flashes but much less.  She is having decreased libido    She does note less vaginal dryness      Review of Systems   Constitutional: Negative.    Gastrointestinal: Negative.    Endocrine: Positive for heat intolerance.   Genitourinary: Negative.           Objective   There were no vitals taken for this visit.     Physical Exam

## 2025-04-25 ENCOUNTER — OFFICE VISIT (OUTPATIENT)
Dept: GYNECOLOGY | Facility: CLINIC | Age: 55
End: 2025-04-25
Payer: COMMERCIAL

## 2025-04-25 VITALS — WEIGHT: 165 LBS | BODY MASS INDEX: 28.32 KG/M2 | DIASTOLIC BLOOD PRESSURE: 60 MMHG | SYSTOLIC BLOOD PRESSURE: 110 MMHG

## 2025-04-25 DIAGNOSIS — R23.2 HOT FLASHES: Primary | ICD-10-CM

## 2025-04-25 DIAGNOSIS — N89.8 VAGINAL DRYNESS: ICD-10-CM

## 2025-04-25 PROCEDURE — 99213 OFFICE O/P EST LOW 20 MIN: CPT | Performed by: OBSTETRICS & GYNECOLOGY

## 2025-04-25 RX ORDER — ESTRADIOL 0.04 MG/D
1 PATCH, EXTENDED RELEASE TRANSDERMAL 2 TIMES WEEKLY
Qty: 24 PATCH | Refills: 3 | Status: SHIPPED | OUTPATIENT
Start: 2025-04-28

## 2025-04-25 RX ORDER — ESTRADIOL 0.1 MG/G
1 CREAM VAGINAL 2 TIMES WEEKLY
Qty: 42.5 G | Refills: 3 | Status: SHIPPED | OUTPATIENT
Start: 2025-04-28

## 2025-04-25 RX ORDER — PROGESTERONE 100 MG/1
100 CAPSULE ORAL
Qty: 90 CAPSULE | Refills: 3 | Status: SHIPPED | OUTPATIENT
Start: 2025-04-25

## 2025-05-02 ENCOUNTER — VBI (OUTPATIENT)
Dept: ADMINISTRATIVE | Facility: OTHER | Age: 55
End: 2025-05-02

## 2025-05-02 DIAGNOSIS — S82.832A OTHER CLOSED FRACTURE OF DISTAL END OF LEFT FIBULA, INITIAL ENCOUNTER: Primary | ICD-10-CM

## 2025-05-13 ENCOUNTER — RA CDI HCC (OUTPATIENT)
Dept: OTHER | Facility: HOSPITAL | Age: 55
End: 2025-05-13

## 2025-05-13 NOTE — PROGRESS NOTES
HCC coding opportunities       Chart reviewed, no opportunity found: CHART REVIEWED, NO OPPORTUNITY FOUND        Patients Insurance        Commercial Insurance: [employer]

## 2025-05-21 ENCOUNTER — OFFICE VISIT (OUTPATIENT)
Dept: INTERNAL MEDICINE CLINIC | Age: 55
End: 2025-05-21

## 2025-05-21 VITALS
OXYGEN SATURATION: 99 % | TEMPERATURE: 98.7 F | BODY MASS INDEX: 28.31 KG/M2 | WEIGHT: 165.8 LBS | HEART RATE: 87 BPM | DIASTOLIC BLOOD PRESSURE: 84 MMHG | SYSTOLIC BLOOD PRESSURE: 122 MMHG | HEIGHT: 64 IN

## 2025-05-21 DIAGNOSIS — Z78.0 MENOPAUSE: ICD-10-CM

## 2025-05-21 DIAGNOSIS — Z86.2 HX OF IRON DEFICIENCY ANEMIA: ICD-10-CM

## 2025-05-21 DIAGNOSIS — E34.8 PINEAL GLAND CYST: ICD-10-CM

## 2025-05-21 DIAGNOSIS — J45.20 MILD INTERMITTENT ASTHMA WITHOUT COMPLICATION: ICD-10-CM

## 2025-05-21 DIAGNOSIS — F41.8 ANXIETY WITH DEPRESSION: ICD-10-CM

## 2025-05-21 DIAGNOSIS — Z13.21 ENCOUNTER FOR VITAMIN DEFICIENCY SCREENING: ICD-10-CM

## 2025-05-21 DIAGNOSIS — Z00.00 ANNUAL PHYSICAL EXAM: Primary | ICD-10-CM

## 2025-05-21 DIAGNOSIS — Z13.9 SCREENING DUE: ICD-10-CM

## 2025-05-21 NOTE — ASSESSMENT & PLAN NOTE
Depression Screening Follow-up Plan: Patient's depression screening was positive with a PHQ-9 score of 6. Patient advised to follow-up with PCP for further management.  Currently stable on current venlafaxine, do not want to increase at this time given risks of decrease libido.  Patient is interested in talk therapy.  Will be pursuing her outlets such as yoga as soon as her ankle heals which can help decrease anxiety symptoms.  Will rule out other organic causes such as thyroid disorder encourage patient to get increased sleep when able.    Orders:    Ambulatory referral to Psych Services; Future    TSH, 3rd generation; Future

## 2025-05-21 NOTE — ASSESSMENT & PLAN NOTE
Following with OB/GYN is on progesterone replacement therapy.  Last period was over a few years ago.  Requesting baseline FSH LH levels at this time.  Orders:    FSH and LH; Future

## 2025-05-21 NOTE — PROGRESS NOTES
Adult Annual Physical  Name: Kylee De Paz      : 1970      MRN: [id number]  Encounter Provider: Temo Silver MD  Encounter Date: 2025   Encounter department: [facility]    :  Assessment & Plan  Annual physical exam    Orders:    Lipid panel; Future    Hemoglobin A1C; Future    Pineal gland cyst  Patient last performed MRI end of August which showed a stable pineal gland cyst with recommendations to follow-up in 12 months.  Repeat MRI is ordered and in place and patient given central scheduling number to schedule.  Patient previously recommended to see neurosurgery will refer at this point and give patient number to determine if any intervention is necessary.    Orders:    Ambulatory Referral to Neurosurgery; Future    Mild intermittent asthma without complication  Not in current exacerbation has a as needed albuterol rescue inhaler she is not needed to use frequently.       Anxiety with depression  Depression Screening Follow-up Plan: Patient's depression screening was positive with a PHQ-9 score of 6. Patient advised to follow-up with PCP for further management.  Currently stable on current venlafaxine, do not want to increase at this time given risks of decrease libido.  Patient is interested in talk therapy.  Will be pursuing her outlets such as yoga as soon as her ankle heals which can help decrease anxiety symptoms.  Will rule out other organic causes such as thyroid disorder encourage patient to get increased sleep when able.    Orders:    Ambulatory referral to Psych Services; Future    TSH, 3rd generation; Future    Menopause  Following with OB/GYN is on progesterone replacement therapy.  Last period was over a few years ago.  Requesting baseline FSH LH levels at this time.  Orders:    FSH and LH; Future    Encounter for vitamin deficiency screening    Orders:    Vitamin D 25 hydroxy; Future    Hx of iron deficiency anemia  Prior history of iron deficiency  anemia several years ago.  No concern for blood loss at this time and patient no longer menstruating.  Requesting levels be checked.    Orders:    CBC and differential; Future    TIBC Panel (incl. Iron, TIBC, % Iron Saturation); Future    Screening due    Orders:    Lipid panel; Future    Hemoglobin A1C; Future        Preventive Screenings:  - Diabetes Screening: screening up-to-date  - Cholesterol Screening: orders placed   - Hepatitis C screening: screening up-to-date   - HIV screening: screening up-to-date   - Cervical cancer screening: screening up-to-date   - Breast cancer screening: screening up-to-date   - Colon cancer screening: screening up-to-date   - Lung cancer screening: screening not indicated   - Osteoporosis screening: orders placed     Immunizations:  - Immunizations due: Prevnar 20    Counseling/Anticipatory Guidance:  - Alcohol: discussed moderation in alcohol intake and recommendations for healthy alcohol use.   - Drug use: discussed harms of illicit drug use and how it can negatively impact mental/physical health.   - Tobacco use: discussed harms of tobacco use and management options for quitting.   - Dental health: discussed importance of regular tooth brushing, flossing, and dental visits.   - Sexual health: discussed sexually transmitted diseases, partner selection, use of condoms, avoidance of unintended pregnancy, and contraceptive alternatives.   - Diet: discussed recommendations for a healthy/well-balanced diet.   - Exercise: the importance of regular exercise/physical activity was discussed. Recommend exercise 3-5 times per week for at least 30 minutes.   - Injury prevention: discussed safety/seat belts, safety helmets, smoke detectors, carbon monoxide detectors, and smoking near bedding or upholstery.       Depression Screening and Follow-up Plan: Patient's depression screening was positive with a PHQ-9 score of 6.   Patient with underlying depression and was advised to continue current  medications as prescribed. Patient advised to follow-up with PCP for further management. Referral to talk therapy as per plan        History of Present Illness     Adult Annual Physical:  Patient presents for annual physical. 54-year-old female with past medical history of anxiety, depression, asthma, history of iron deficiency anemia, currently in menopause, pineal gland cyst on MRI who is here for an annual physical/follow-up on anxiety/depression.  Patient states that overall she is doing well.  Feels that her anxiety is more under control on her current prescription.  Is fearful to give increase given risks of decreased libido as a side effect and was advised by her OB/GYN to talk to her PCP regarding this.  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  Usually tries to destress with yoga and other exercises but she has had to stop these in the past several months due to her ankle fracture.  Patient is going to see orthopedic surgery shortly and doing exercises for her ankle and hoping to get back into more exercise/her previous outlets shortly.  Overall tries to eat a healthy diet.  Attempts to get at least 6 hours of sleep every night and is going to the dentist and OB/GYN regularly.  No symptoms of nausea vomiting increased a.m. headaches or decreased ability to move eyes..     Diet and Physical Activity:  - Diet/Nutrition: well balanced diet, low calorie diet, low carb diet, intermittent fasting, consuming 3-5 servings of fruits/vegetables daily, adequate fiber intake and adequate whole grain intake.  - Exercise: walking, moderate cardiovascular exercise and 3-4 times a week on average. Recovering from broken ankle    Depression Screening:    - PHQ-9 Score: 6    General Health:  - Sleep: 4-6 hours of sleep on average.  - Hearing: normal hearing bilateral ears.  - Vision: most recent eye exam < 1 year ago and wears glasses.  - Dental: regular dental visits.    /GYN Health:  - Follows with GYN: yes.   -  "Menopause: postmenopausal.   - History of STDs: yes  - Contraception: menopause.      Advanced Care Planning:  - Has an advanced directive?: yes    - Has a durable medical POA?: no    - ACP document given to patient?: no      Review of Systems   Constitutional:  Negative for chills and fever.   HENT:  Negative for ear pain and sore throat.    Eyes:  Negative for pain and visual disturbance.   Respiratory:  Negative for cough and shortness of breath.    Cardiovascular:  Negative for chest pain and palpitations.   Gastrointestinal:  Negative for abdominal pain and vomiting.   Genitourinary:  Negative for dysuria and hematuria.   Musculoskeletal:  Negative for arthralgias and back pain.   Skin:  Negative for color change and rash.   Neurological:  Negative for seizures and syncope.   Psychiatric/Behavioral:  Negative for dysphoric mood, self-injury and suicidal ideas. The patient is nervous/anxious.    All other systems reviewed and are negative.        Objective   /84 (BP Location: Left arm, Patient Position: Sitting, Cuff Size: Standard)   Pulse 87   Temp 98.7 °F (37.1 °C) (Temporal)   Ht 5' 4\" (1.626 m)   Wt 75.2 kg (165 lb 12.8 oz)   SpO2 99%   BMI 28.46 kg/m²     Physical Exam  Vitals and nursing note reviewed.   Constitutional:       General: She is not in acute distress.     Appearance: She is well-developed. She is not ill-appearing or diaphoretic.   HENT:      Head: Normocephalic and atraumatic.      Mouth/Throat:      Mouth: Mucous membranes are moist.      Pharynx: Oropharynx is clear.     Eyes:      Conjunctiva/sclera: Conjunctivae normal.      Comments: Wears glasses     Cardiovascular:      Rate and Rhythm: Normal rate and regular rhythm.      Heart sounds: No murmur heard.  Pulmonary:      Effort: Pulmonary effort is normal. No respiratory distress.      Breath sounds: Normal breath sounds.   Abdominal:      Palpations: Abdomen is soft.      Tenderness: There is no abdominal tenderness. "     Musculoskeletal:         General: No swelling.      Cervical back: Neck supple.      Right lower leg: No edema.      Left lower leg: No edema.     Skin:     General: Skin is warm and dry.      Capillary Refill: Capillary refill takes less than 2 seconds.     Neurological:      Mental Status: She is alert and oriented to person, place, and time. Mental status is at baseline.     Psychiatric:         Mood and Affect: Mood normal.         Behavior: Behavior normal.         Thought Content: Thought content normal.         Judgment: Judgment normal.

## 2025-05-21 NOTE — ASSESSMENT & PLAN NOTE
Patient last performed MRI end of August which showed a stable pineal gland cyst with recommendations to follow-up in 12 months.  Repeat MRI is ordered and in place and patient given central scheduling number to schedule.  Patient previously recommended to see neurosurgery will refer at this point and give patient number to determine if any intervention is necessary.    Orders:    Ambulatory Referral to Neurosurgery; Future

## 2025-05-21 NOTE — PATIENT INSTRUCTIONS
"Central schedule [phone] get your MRI by August for 1 year follow-up; use this number for your dexa scan as well  Neurosurgery referral [phone]   Psych for talk therapy (long wait list) [phone]  Get following labs: TSH, vitamin d, cbc, iron, LH/FSH (hormones), lipid, A1c, iron---you need to be fasted for the lipid panel so just just get in morning before breakfast black coffee okay    Patient Education     Routine physical for adults   The Basics   Written by the doctors and editors at [facility]   What is a physical? -- A physical is a routine visit, or \"check-up,\" with your doctor. You might also hear it called a \"wellness visit\" or \"preventive visit.\"  During each visit, the doctor will:   Ask about your physical and mental health   Ask about your habits, behaviors, and lifestyle   Do an exam   Give you vaccines if needed   Talk to you about any medicines you take   Give advice about your health   Answer your questions  Getting regular check-ups is an important part of taking care of your health. It can help your doctor find and treat any problems you have. But it's also important for preventing health problems.  A routine physical is different from a \"sick visit.\" A sick visit is when you see a doctor because of a health concern or problem. Since physicals are scheduled ahead of time, you can think about what you want to ask the doctor.  How often should I get a physical? -- It depends on your age and health. In general, for people age 21 years and older:   If you are younger than 50 years, you might be able to get a physical every 3 years.   If you are 50 years or older, your doctor might recommend a physical every year.  If you have an ongoing health condition, like diabetes or high blood pressure, your doctor will probably want to see you more often.  What happens during a physical? -- In general, each visit will include:   Physical exam - The doctor or nurse will check your height, weight, " "heart rate, and blood pressure. They will also look at your eyes and ears. They will ask about how you are feeling and whether you have any symptoms that bother you.   Medicines - It's a good idea to bring a list of all the medicines you take to each doctor visit. Your doctor will talk to you about your medicines and answer any questions. Tell them if you are having any side effects that bother you. You should also tell them if you are having trouble paying for any of your medicines.   Habits and behaviors - This includes:   Your diet   Your exercise habits   Whether you smoke, drink alcohol, or use drugs   Whether you are sexually active   Whether you feel safe at home  Your doctor will talk to you about things you can do to improve your health and lower your risk of health problems. They will also offer help and support. For example, if you want to quit smoking, they can give you advice and might prescribe medicines. If you want to improve your diet or get more physical activity, they can help you with this, too.   Lab tests, if needed - The tests you get will depend on your age and situation. For example, your doctor might want to check your:   Cholesterol   Blood sugar   Iron level   Vaccines - The recommended vaccines will depend on your age, health, and what vaccines you already had. Vaccines are very important because they can prevent certain serious or deadly infections.   Discussion of screening - \"Screening\" means checking for diseases or other health problems before they cause symptoms. Your doctor can recommend screening based on your age, risk, and preferences. This might include tests to check for:   Cancer, such as breast, prostate, cervical, ovarian, colorectal, prostate, lung, or skin cancer   Sexually transmitted infections, such as chlamydia and gonorrhea   Mental health conditions like depression and anxiety  Your doctor will talk to you about the different types of screening tests. They can help " you decide which screenings to have. They can also explain what the results might mean.   Answering questions - The physical is a good time to ask the doctor or nurse questions about your health. If needed, they can refer you to other doctors or specialists, too.  Adults older than 65 years often need other care, too. As you get older, your doctor will talk to you about:   How to prevent falling at home   Hearing or vision tests   Memory testing   How to take your medicines safely   Making sure that you have the help and support you need at home  All topics are updated as new evidence becomes available and our peer review process is complete.  This topic retrieved from [employer] on: May 02, 2024.  Topic 706983 Version 1.0  Release: 32.4.3 - C32.122  © 2024 UpToDate, Inc. and/or its affiliates. All rights reserved.  Consumer Information Use and Disclaimer   Disclaimer: This generalized information is a limited summary of diagnosis, treatment, and/or medication information. It is not meant to be comprehensive and should be used as a tool to help the user understand and/or assess potential diagnostic and treatment options. It does NOT include all information about conditions, treatments, medications, side effects, or risks that may apply to a specific patient. It is not intended to be medical advice or a substitute for the medical advice, diagnosis, or treatment of a health care provider based on the health care provider's examination and assessment of a patient's specific and unique circumstances. Patients must speak with a health care provider for complete information about their health, medical questions, and treatment options, including any risks or benefits regarding use of medications. This information does not endorse any treatments or medications as safe, effective, or approved for treating a specific patient. UpToDate, Inc. and its affiliates disclaim any warranty or liability relating to this information or  the use thereof.The use of this information is governed by the Terms of Use, available at https://www.wolterskluwer.com/en/know/clinical-effectiveness-terms. 2024© UpToDate, Inc. and its affiliates and/or licensors. All rights reserved.  Copyright   © 2024 [employer] and/or its affiliates. All rights reserved.

## 2025-05-21 NOTE — ASSESSMENT & PLAN NOTE
Not in current exacerbation has a as needed albuterol rescue inhaler she is not needed to use frequently.

## 2025-05-31 ENCOUNTER — HOSPITAL ENCOUNTER (OUTPATIENT)
Dept: MRI IMAGING | Facility: HOSPITAL | Age: 55
Discharge: HOME | End: 2025-05-31
Attending: INTERNAL MEDICINE
Payer: COMMERCIAL

## 2025-05-31 DIAGNOSIS — E34.8 PINEAL GLAND CYST: ICD-10-CM

## 2025-05-31 PROCEDURE — A9585 GADOBUTROL INJECTION: HCPCS | Performed by: INTERNAL MEDICINE

## 2025-05-31 PROCEDURE — 70553 MRI BRAIN STEM W/O & W/DYE: CPT

## 2025-05-31 RX ORDER — GADOBUTROL 604.72 MG/ML
7 INJECTION INTRAVENOUS
Status: COMPLETED | OUTPATIENT
Start: 2025-05-31 | End: 2025-05-31

## 2025-05-31 RX ADMIN — GADOBUTROL 7 ML: 604.72 INJECTION INTRAVENOUS at 15:23

## 2025-06-05 ENCOUNTER — CONSULT (OUTPATIENT)
Dept: NEUROSURGERY | Facility: CLINIC | Age: 55
End: 2025-06-05
Payer: COMMERCIAL

## 2025-06-05 VITALS
TEMPERATURE: 97.8 F | BODY MASS INDEX: 28.17 KG/M2 | HEIGHT: 64 IN | DIASTOLIC BLOOD PRESSURE: 73 MMHG | SYSTOLIC BLOOD PRESSURE: 102 MMHG | WEIGHT: 165 LBS | HEART RATE: 72 BPM | OXYGEN SATURATION: 97 %

## 2025-06-05 DIAGNOSIS — E34.8 PINEAL GLAND CYST: Primary | ICD-10-CM

## 2025-06-05 PROCEDURE — 99204 OFFICE O/P NEW MOD 45 MIN: CPT | Performed by: NURSE PRACTITIONER

## 2025-06-05 RX ORDER — NAPROXEN 500 MG/1
500 TABLET ORAL 2 TIMES DAILY WITH MEALS
COMMUNITY

## 2025-06-05 NOTE — PROGRESS NOTES
Name: Kylee De Paz      : 1970      MRN: [id number]  Encounter Provider: NERIS Holt  Encounter Date: 2025   Encounter department: [facility]  :  Assessment & Plan  Pineal gland cyst  Presents as referral from PCP for evaluation of pineal gland cyst  Discovered incidentally on  MRI brain completed 2024 as part of work up for episode of dizziness and blurred vision.  C/o blurred vision, word finding difficulty x 1 year.  Evaluated by optometrist multiple change with various changes to glasses prescription.  Exam is non-focal.    Imaging:  MRI brain w/wo, 25: 1.  Stable 2.2 cm pineal cyst with mild mass effect on the tectum. No hydrocephalus. Continued clinical and imaging surveillance advised. 2.  Mild, chronic microangiopathy is stable. 3.  No acute infarction or intracranial hemorrhage. 4.  Moderate pansinusitis.    Plan:  Reviewed imaging extensively with patient.  Discussed that pineal glad cyst appears stable compared to MRI brain from 2024.  Finding is benign and no evidence of hydrocephalus.  This is likely unrelated to her ongoing symptoms of blurred vision, etc.  Follow up as needed.             History of Present Illness     Kylee De Paz is a 54 y.o. female    With past medical history of asthma, anxiety and depression, who presents as referral from PCP for evaluation of incidental pineal gland cyst.  She relates that about a year ago she was in the hospital due to an episode of dizziness.  She had MRI brain completed which was concerning for pineal gland cyst.  She was referred to neurosurgery however there was some miscommunication and she was not evaluated by our service.  She had repeat MRI brain completed per her PCP recently and pineal cyst was again identified.    She relates that she has been experiencing blurred vision for over the past year or so.  She has been evaluated by her optometrist multiple times and changed her  eyeglass prescription many times as well.  She continues to experience blurred vision when she tries to read or even for far distances such as watching TV.  She complains of increase in word finding difficulty and occasional garbled speech for the past year.  She also complains of some slight imbalance.  She is otherwise in good health and continues to work.         Review of Systems   Constitutional: Negative.    HENT: Negative.     Eyes:  Positive for visual disturbance (blurred vision x 1 yr-- sees optometrist yearly).   Respiratory: Negative.     Cardiovascular: Negative.    Gastrointestinal: Negative.    Endocrine: Negative.    Genitourinary:  Positive for urgency.   Musculoskeletal: Negative.    Skin: Negative.    Allergic/Immunologic: Negative.    Neurological:  Positive for headaches (naproxen/advil PRN pain).        Pineal Gland Cyst-- Brain Mri on 5/31/25      Difficulty finding the right words   Hematological: Negative.    Psychiatric/Behavioral:  Positive for confusion (Brain Fog) and decreased concentration.    All other systems reviewed and are negative.    I have personally reviewed the MA's review of systems and made changes as necessary.    Past Medical History   Past Medical History[1]  Past Surgical History[2]  Family History[3]  she reports that she has never smoked. She has never used smokeless tobacco. She reports current alcohol use of about 4.0 standard drinks of alcohol per week. She reports that she does not use drugs.  Current Outpatient Medications   Medication Instructions    albuterol (Ventolin HFA) 90 mcg/act inhaler 2 puffs, Inhalation, Every 6 hours PRN    cetirizine-pseudoephedrine (ZyrTEC-D) 5-120 MG per tablet 1 tablet, Oral, 2 times daily    cyclobenzaprine (FLEXERIL) 5 mg, Oral, 3 times daily PRN    estradiol (Clarisa) 0.0375 MG/24HR 1 patch, Transdermal, 2 times weekly    estradiol (ESTRACE VAGINAL) 1 g, Vaginal, 2 times weekly, At bedtime    FLUoxetine (PROZAC) 10 mg, Oral,  "Daily    fluticasone (FLONASE) 50 mcg/act nasal spray 1 spray, Nasal, Daily    ibuprofen (MOTRIN) 400 mg, Every 6 hours PRN    naproxen (EC NAPROSYN) 500 mg, 2 times daily with meals    Progesterone 100 mg, Oral, Daily at bedtime   Allergies[4]   Objective   /73   Pulse 72   Temp 97.8 °F (36.6 °C) (Temporal)   Ht 5' 4\" (1.626 m)   Wt 74.8 kg (165 lb)   SpO2 97%   BMI 28.32 kg/m²     Physical Exam  Constitutional:       Appearance: She is well-developed.   HENT:      Head: Normocephalic and atraumatic.     Eyes:      General: Lids are normal.      Extraocular Movements: Extraocular movements intact.      Pupils: Pupils are equal, round, and reactive to light.     Pulmonary:      Effort: Pulmonary effort is normal.   Abdominal:      Palpations: Abdomen is soft.     Musculoskeletal:         General: Normal range of motion.      Cervical back: Normal range of motion and neck supple.     Skin:     General: Skin is warm and dry.     Neurological:      Mental Status: She is alert and oriented to person, place, and time.      Motor: Motor strength is normal.      Neurological Exam  Mental Status  Alert. Oriented to person, place, and time.    Cranial Nerves  CN II: Visual acuity is normal. Visual fields full to confrontation.  CN III, IV, VI: Extraocular movements intact bilaterally. Normal lids and orbits bilaterally. Pupils equal round and reactive to light bilaterally.  CN V: Facial sensation is normal.  CN VII: Full and symmetric facial movement.  CN VIII: Hearing is normal.  CN IX, X: Palate elevates symmetrically. Normal gag reflex.  CN XI: Shoulder shrug strength is normal.  CN XII: Tongue midline without atrophy or fasciculations.    Motor   Strength is 5/5 throughout all four extremities.      Radiology Results Review: I personally reviewed the following image studies in PACS and associated radiology reports: MRI brain. My interpretation of the radiology images/reports is: as above.               [1] "   Past Medical History:  Diagnosis Date    Abnormal Pap smear of cervix     Allergic 2019    Supha drugs    Anxiety     Carpal tunnel syndrome     right wrist    Chronic back pain     Depression     Genital warts     Herpes     Menopause ovarian failure     Obesity (BMI 30.0-34.9) 11/03/2020    Urinary incontinence     Vulvar ulcer    [2]   Past Surgical History:  Procedure Laterality Date    CARPAL TUNNEL RELEASE Left 10/2021    CONDYLOMA EXCISION/FULGURATION      NO PAST SURGERIES     [3]   Family History  Problem Relation Name Age of Onset    Hypertension Mother Mom     Vision loss Mother Mom         UNSURE WHY    Macular degeneration Mother Mom     Atrial fibrillation Mother Mom         Atrial stenosis    Alcohol abuse Father Dad     Mental illness Father Dad     Schizophrenia Father Dad     Breast cancer Maternal Aunt Aunt    [4]   Allergies  Allergen Reactions    Bactrim [Sulfamethoxazole-Trimethoprim] Hives

## 2025-06-05 NOTE — ASSESSMENT & PLAN NOTE
Presents as referral from PCP for evaluation of pineal gland cyst  Discovered incidentally on  MRI brain completed 8/2024 as part of work up for episode of dizziness and blurred vision.  C/o blurred vision, word finding difficulty x 1 year.  Evaluated by optometrist multiple change with various changes to glasses prescription.  Exam is non-focal.    Imaging:  MRI brain w/wo, 5/31/25: 1.  Stable 2.2 cm pineal cyst with mild mass effect on the tectum. No hydrocephalus. Continued clinical and imaging surveillance advised. 2.  Mild, chronic microangiopathy is stable. 3.  No acute infarction or intracranial hemorrhage. 4.  Moderate pansinusitis.    Plan:  Reviewed imaging extensively with patient.  Discussed that pineal glad cyst appears stable compared to MRI brain from August 2024.  Finding is benign and no evidence of hydrocephalus.  This is likely unrelated to her ongoing symptoms of blurred vision, etc.  Follow up as needed.

## 2025-06-06 ENCOUNTER — APPOINTMENT (OUTPATIENT)
Dept: LAB | Facility: CLINIC | Age: 55
End: 2025-06-06
Attending: INTERNAL MEDICINE
Payer: COMMERCIAL

## 2025-06-06 ENCOUNTER — TELEPHONE (OUTPATIENT)
Age: 55
End: 2025-06-06

## 2025-06-06 DIAGNOSIS — R30.0 DYSURIA: ICD-10-CM

## 2025-06-06 DIAGNOSIS — F41.8 ANXIETY WITH DEPRESSION: ICD-10-CM

## 2025-06-06 DIAGNOSIS — Z78.0 MENOPAUSE: ICD-10-CM

## 2025-06-06 DIAGNOSIS — Z86.2 HX OF IRON DEFICIENCY ANEMIA: ICD-10-CM

## 2025-06-06 DIAGNOSIS — E04.1 THYROID NODULE: ICD-10-CM

## 2025-06-06 DIAGNOSIS — Z13.9 SCREENING DUE: ICD-10-CM

## 2025-06-06 DIAGNOSIS — Z13.21 ENCOUNTER FOR VITAMIN DEFICIENCY SCREENING: ICD-10-CM

## 2025-06-06 DIAGNOSIS — E34.8 PINEAL GLAND CYST: ICD-10-CM

## 2025-06-06 DIAGNOSIS — Z00.00 ANNUAL PHYSICAL EXAM: ICD-10-CM

## 2025-06-06 LAB
25(OH)D3 SERPL-MCNC: 43 NG/ML (ref 30–100)
ALBUMIN SERPL BCG-MCNC: 4.9 G/DL (ref 3.5–5)
ALP SERPL-CCNC: 81 U/L (ref 34–104)
ALT SERPL W P-5'-P-CCNC: 20 U/L (ref 7–52)
ANION GAP SERPL CALCULATED.3IONS-SCNC: 8 MMOL/L (ref 4–13)
AST SERPL W P-5'-P-CCNC: 22 U/L (ref 13–39)
BACTERIA UR QL AUTO: NORMAL /HPF
BASOPHILS # BLD AUTO: 0.02 THOUSANDS/ÂΜL (ref 0–0.1)
BASOPHILS NFR BLD AUTO: 0 % (ref 0–1)
BILIRUB SERPL-MCNC: 0.86 MG/DL (ref 0.2–1)
BILIRUB UR QL STRIP: NEGATIVE
BUN SERPL-MCNC: 21 MG/DL (ref 5–25)
CALCIUM SERPL-MCNC: 9.8 MG/DL (ref 8.4–10.2)
CHLORIDE SERPL-SCNC: 101 MMOL/L (ref 96–108)
CHOLEST SERPL-MCNC: 259 MG/DL (ref ?–200)
CLARITY UR: CLEAR
CO2 SERPL-SCNC: 30 MMOL/L (ref 21–32)
COLOR UR: YELLOW
CREAT SERPL-MCNC: 0.64 MG/DL (ref 0.6–1.3)
EOSINOPHIL # BLD AUTO: 0.26 THOUSAND/ÂΜL (ref 0–0.61)
EOSINOPHIL NFR BLD AUTO: 5 % (ref 0–6)
ERYTHROCYTE [DISTWIDTH] IN BLOOD BY AUTOMATED COUNT: 13.2 % (ref 11.6–15.1)
EST. AVERAGE GLUCOSE BLD GHB EST-MCNC: 105 MG/DL
FSH SERPL-ACNC: 56.5 MIU/ML
GFR SERPL CREATININE-BSD FRML MDRD: 100 ML/MIN/1.73SQ M
GLUCOSE P FAST SERPL-MCNC: 96 MG/DL (ref 65–99)
GLUCOSE UR STRIP-MCNC: NEGATIVE MG/DL
HBA1C MFR BLD: 5.3 %
HCT VFR BLD AUTO: 46 % (ref 34.8–46.1)
HDLC SERPL-MCNC: 95 MG/DL
HGB BLD-MCNC: 14.9 G/DL (ref 11.5–15.4)
HGB UR QL STRIP.AUTO: NEGATIVE
IMM GRANULOCYTES # BLD AUTO: 0.02 THOUSAND/UL (ref 0–0.2)
IMM GRANULOCYTES NFR BLD AUTO: 0 % (ref 0–2)
IRON SATN MFR SERPL: 20 % (ref 15–50)
IRON SERPL-MCNC: 87 UG/DL (ref 50–212)
KETONES UR STRIP-MCNC: NEGATIVE MG/DL
LDLC SERPL CALC-MCNC: 155 MG/DL (ref 0–100)
LEUKOCYTE ESTERASE UR QL STRIP: NEGATIVE
LH SERPL-ACNC: 13.8 MIU/ML
LYMPHOCYTES # BLD AUTO: 1.89 THOUSANDS/ÂΜL (ref 0.6–4.47)
LYMPHOCYTES NFR BLD AUTO: 35 % (ref 14–44)
MCH RBC QN AUTO: 29.2 PG (ref 26.8–34.3)
MCHC RBC AUTO-ENTMCNC: 32.4 G/DL (ref 31.4–37.4)
MCV RBC AUTO: 90 FL (ref 82–98)
MONOCYTES # BLD AUTO: 0.29 THOUSAND/ÂΜL (ref 0.17–1.22)
MONOCYTES NFR BLD AUTO: 5 % (ref 4–12)
NEUTROPHILS # BLD AUTO: 2.87 THOUSANDS/ÂΜL (ref 1.85–7.62)
NEUTS SEG NFR BLD AUTO: 55 % (ref 43–75)
NITRITE UR QL STRIP: NEGATIVE
NON-SQ EPI CELLS URNS QL MICRO: NORMAL /HPF
NONHDLC SERPL-MCNC: 164 MG/DL
NRBC BLD AUTO-RTO: 0 /100 WBCS
PH UR STRIP.AUTO: 7 [PH]
PLATELET # BLD AUTO: 288 THOUSANDS/UL (ref 149–390)
PMV BLD AUTO: 9.5 FL (ref 8.9–12.7)
POTASSIUM SERPL-SCNC: 3.8 MMOL/L (ref 3.5–5.3)
PROT SERPL-MCNC: 7.7 G/DL (ref 6.4–8.4)
PROT UR STRIP-MCNC: ABNORMAL MG/DL
PTH-INTACT SERPL-MCNC: 33.2 PG/ML (ref 12–88)
RBC # BLD AUTO: 5.11 MILLION/UL (ref 3.81–5.12)
RBC #/AREA URNS AUTO: NORMAL /HPF
SODIUM SERPL-SCNC: 139 MMOL/L (ref 135–147)
SP GR UR STRIP.AUTO: 1.02 (ref 1–1.03)
TIBC SERPL-MCNC: 429.8 UG/DL (ref 250–450)
TRANSFERRIN SERPL-MCNC: 307 MG/DL (ref 203–362)
TRIGL SERPL-MCNC: 45 MG/DL (ref ?–150)
TSH SERPL DL<=0.05 MIU/L-ACNC: 1.74 UIU/ML (ref 0.45–4.5)
UIBC SERPL-MCNC: 343 UG/DL (ref 155–355)
UROBILINOGEN UR STRIP-ACNC: <2 MG/DL
WBC # BLD AUTO: 5.35 THOUSAND/UL (ref 4.31–10.16)
WBC #/AREA URNS AUTO: NORMAL /HPF

## 2025-06-06 PROCEDURE — 85025 COMPLETE CBC W/AUTO DIFF WBC: CPT

## 2025-06-06 PROCEDURE — 82306 VITAMIN D 25 HYDROXY: CPT

## 2025-06-06 PROCEDURE — 80053 COMPREHEN METABOLIC PANEL: CPT

## 2025-06-06 PROCEDURE — 80061 LIPID PANEL: CPT

## 2025-06-06 PROCEDURE — 83036 HEMOGLOBIN GLYCOSYLATED A1C: CPT

## 2025-06-06 PROCEDURE — 36415 COLL VENOUS BLD VENIPUNCTURE: CPT

## 2025-06-06 PROCEDURE — 83002 ASSAY OF GONADOTROPIN (LH): CPT

## 2025-06-06 PROCEDURE — 84443 ASSAY THYROID STIM HORMONE: CPT

## 2025-06-06 PROCEDURE — 83540 ASSAY OF IRON: CPT

## 2025-06-06 PROCEDURE — 83550 IRON BINDING TEST: CPT

## 2025-06-06 PROCEDURE — 83001 ASSAY OF GONADOTROPIN (FSH): CPT

## 2025-06-06 PROCEDURE — 81001 URINALYSIS AUTO W/SCOPE: CPT

## 2025-06-06 PROCEDURE — 83970 ASSAY OF PARATHORMONE: CPT

## 2025-06-06 NOTE — TELEPHONE ENCOUNTER
Contacted patient in regards to ROUTINE Referral. Patient refused to give HIPAA identifiers.     Closing referral.

## 2025-06-08 ENCOUNTER — RESULTS FOLLOW-UP (OUTPATIENT)
Dept: GYNECOLOGY | Facility: CLINIC | Age: 55
End: 2025-06-08

## 2025-06-13 ENCOUNTER — PATIENT MESSAGE (OUTPATIENT)
Dept: NEUROSURGERY | Facility: CLINIC | Age: 55
End: 2025-06-13

## 2025-06-13 ENCOUNTER — RESULTS FOLLOW-UP (OUTPATIENT)
Dept: INTERNAL MEDICINE CLINIC | Facility: CLINIC | Age: 55
End: 2025-06-13

## 2025-06-13 DIAGNOSIS — H53.8 BLURRY VISION: Primary | ICD-10-CM

## 2025-06-13 DIAGNOSIS — E34.8 PINEAL GLAND CYST: ICD-10-CM

## 2025-08-01 DIAGNOSIS — Z00.6 ENCOUNTER FOR EXAMINATION FOR NORMAL COMPARISON OR CONTROL IN CLINICAL RESEARCH PROGRAM: ICD-10-CM

## 2025-08-04 ENCOUNTER — TELEPHONE (OUTPATIENT)
Dept: OBGYN CLINIC | Facility: CLINIC | Age: 55
End: 2025-08-04

## 2025-08-04 ENCOUNTER — TELEPHONE (OUTPATIENT)
Age: 55
End: 2025-08-04

## 2025-08-04 DIAGNOSIS — N39.0 UTI (URINARY TRACT INFECTION), UNCOMPLICATED: Primary | ICD-10-CM

## 2025-08-04 RX ORDER — NITROFURANTOIN 25; 75 MG/1; MG/1
100 CAPSULE ORAL 2 TIMES DAILY
Qty: 10 CAPSULE | Refills: 0 | Status: SHIPPED | OUTPATIENT
Start: 2025-08-04 | End: 2025-08-09